# Patient Record
Sex: MALE | Race: WHITE | Employment: OTHER | URBAN - METROPOLITAN AREA
[De-identification: names, ages, dates, MRNs, and addresses within clinical notes are randomized per-mention and may not be internally consistent; named-entity substitution may affect disease eponyms.]

---

## 2017-01-18 ENCOUNTER — TELEPHONE (OUTPATIENT)
Dept: FAMILY MEDICINE | Facility: CLINIC | Age: 72
End: 2017-01-18

## 2017-01-26 NOTE — TELEPHONE ENCOUNTER
"Info given to wife as patient is\" gone ice fishing almost non stop\"  Wife will have him set up an appointment.  "

## 2017-02-21 ENCOUNTER — OFFICE VISIT (OUTPATIENT)
Dept: FAMILY MEDICINE | Facility: CLINIC | Age: 72
End: 2017-02-21
Payer: COMMERCIAL

## 2017-02-21 VITALS
TEMPERATURE: 97.9 F | SYSTOLIC BLOOD PRESSURE: 121 MMHG | OXYGEN SATURATION: 96 % | WEIGHT: 237 LBS | BODY MASS INDEX: 35.1 KG/M2 | HEIGHT: 69 IN | RESPIRATION RATE: 16 BRPM | DIASTOLIC BLOOD PRESSURE: 77 MMHG | HEART RATE: 82 BPM

## 2017-02-21 DIAGNOSIS — E11.65 TYPE 2 DIABETES MELLITUS WITH HYPERGLYCEMIA, WITH LONG-TERM CURRENT USE OF INSULIN (H): ICD-10-CM

## 2017-02-21 DIAGNOSIS — E11.65 TYPE 2 DIABETES MELLITUS WITH HYPERGLYCEMIA (H): ICD-10-CM

## 2017-02-21 DIAGNOSIS — E78.5 HYPERLIPIDEMIA LDL GOAL <70: ICD-10-CM

## 2017-02-21 DIAGNOSIS — Z79.4 TYPE 2 DIABETES MELLITUS WITH HYPERGLYCEMIA, WITH LONG-TERM CURRENT USE OF INSULIN (H): ICD-10-CM

## 2017-02-21 DIAGNOSIS — N40.0 BENIGN NON-NODULAR PROSTATIC HYPERPLASIA, PRESENCE OF LOWER URINARY TRACT SYMPTOMS UNSPECIFIED: Primary | ICD-10-CM

## 2017-02-21 DIAGNOSIS — I10 BENIGN ESSENTIAL HYPERTENSION: ICD-10-CM

## 2017-02-21 DIAGNOSIS — R30.0 DYSURIA: ICD-10-CM

## 2017-02-21 DIAGNOSIS — Z12.5 SCREENING FOR PROSTATE CANCER: ICD-10-CM

## 2017-02-21 LAB
ALBUMIN SERPL-MCNC: 3.9 G/DL (ref 3.4–5)
ALBUMIN UR-MCNC: ABNORMAL MG/DL
ALP SERPL-CCNC: 183 U/L (ref 40–150)
ALT SERPL W P-5'-P-CCNC: 64 U/L (ref 0–70)
ANION GAP SERPL CALCULATED.3IONS-SCNC: 6 MMOL/L (ref 3–14)
APPEARANCE UR: ABNORMAL
AST SERPL W P-5'-P-CCNC: 27 U/L (ref 0–45)
BILIRUB SERPL-MCNC: 0.5 MG/DL (ref 0.2–1.3)
BILIRUB UR QL STRIP: NEGATIVE
BUN SERPL-MCNC: 30 MG/DL (ref 7–30)
CALCIUM SERPL-MCNC: 9.1 MG/DL (ref 8.5–10.1)
CHLORIDE SERPL-SCNC: 104 MMOL/L (ref 94–109)
CHOLEST SERPL-MCNC: 120 MG/DL
CO2 SERPL-SCNC: 28 MMOL/L (ref 20–32)
COLOR UR AUTO: YELLOW
CREAT SERPL-MCNC: 1.18 MG/DL (ref 0.66–1.25)
CREAT UR-MCNC: 139 MG/DL
GFR SERPL CREATININE-BSD FRML MDRD: 61 ML/MIN/1.7M2
GLUCOSE SERPL-MCNC: 196 MG/DL (ref 70–99)
GLUCOSE UR STRIP-MCNC: NEGATIVE MG/DL
HBA1C MFR BLD: 9 % (ref 4.3–6)
HDLC SERPL-MCNC: 40 MG/DL
HGB UR QL STRIP: ABNORMAL
KETONES UR STRIP-MCNC: NEGATIVE MG/DL
LDLC SERPL CALC-MCNC: 62 MG/DL
LEUKOCYTE ESTERASE UR QL STRIP: ABNORMAL
MICROALBUMIN UR-MCNC: 82 MG/L
MICROALBUMIN/CREAT UR: 58.63 MG/G CR (ref 0–17)
NITRATE UR QL: POSITIVE
NON-SQ EPI CELLS #/AREA URNS LPF: ABNORMAL /LPF
NONHDLC SERPL-MCNC: 80 MG/DL
PH UR STRIP: 6 PH (ref 5–7)
POTASSIUM SERPL-SCNC: 5.2 MMOL/L (ref 3.4–5.3)
PROT SERPL-MCNC: 8.7 G/DL (ref 6.8–8.8)
PSA SERPL-ACNC: 3.84 UG/L (ref 0–4)
RBC #/AREA URNS AUTO: ABNORMAL /HPF (ref 0–2)
SODIUM SERPL-SCNC: 138 MMOL/L (ref 133–144)
SP GR UR STRIP: 1.02 (ref 1–1.03)
TRIGL SERPL-MCNC: 88 MG/DL
URN SPEC COLLECT METH UR: ABNORMAL
UROBILINOGEN UR STRIP-ACNC: 0.2 EU/DL (ref 0.2–1)
WBC #/AREA URNS AUTO: >100 /HPF (ref 0–2)

## 2017-02-21 PROCEDURE — 84153 ASSAY OF PSA TOTAL: CPT | Performed by: NURSE PRACTITIONER

## 2017-02-21 PROCEDURE — 83036 HEMOGLOBIN GLYCOSYLATED A1C: CPT | Performed by: NURSE PRACTITIONER

## 2017-02-21 PROCEDURE — 99215 OFFICE O/P EST HI 40 MIN: CPT | Performed by: NURSE PRACTITIONER

## 2017-02-21 PROCEDURE — 81001 URINALYSIS AUTO W/SCOPE: CPT | Performed by: NURSE PRACTITIONER

## 2017-02-21 PROCEDURE — 36415 COLL VENOUS BLD VENIPUNCTURE: CPT | Performed by: NURSE PRACTITIONER

## 2017-02-21 PROCEDURE — 80053 COMPREHEN METABOLIC PANEL: CPT | Performed by: NURSE PRACTITIONER

## 2017-02-21 PROCEDURE — 80061 LIPID PANEL: CPT | Performed by: NURSE PRACTITIONER

## 2017-02-21 PROCEDURE — 82043 UR ALBUMIN QUANTITATIVE: CPT | Performed by: NURSE PRACTITIONER

## 2017-02-21 RX ORDER — INSULIN GLARGINE 100 [IU]/ML
INJECTION, SOLUTION SUBCUTANEOUS
Qty: 3 VIAL | Refills: 4 | Status: SHIPPED
Start: 2017-02-21 | End: 2017-03-21

## 2017-02-21 RX ORDER — SULFAMETHOXAZOLE/TRIMETHOPRIM 800-160 MG
1 TABLET ORAL 2 TIMES DAILY
Qty: 14 TABLET | Refills: 0 | Status: SHIPPED | OUTPATIENT
Start: 2017-02-21 | End: 2017-02-28

## 2017-02-21 RX ORDER — TAMSULOSIN HYDROCHLORIDE 0.4 MG/1
0.4 CAPSULE ORAL DAILY
Qty: 90 CAPSULE | Refills: 1 | Status: SHIPPED | OUTPATIENT
Start: 2017-02-21 | End: 2017-06-12

## 2017-02-21 NOTE — PATIENT INSTRUCTIONS
1. Increase Lantus from 30 units to 33 units and eventually up to 36 units . Increase every 3-5 days.   2. Schedule appointment with Daria Awad- bring Blood sugar logs with you  3. Urine today  4. Start taking Tamsulosin for the urinary/bladder.  5. Take your Furosamide in the morning  6. Follow up in 1-2 months to see if symptoms are improved          Thank you for choosing [facility].  You may be receiving a survey in the mail from [place] regarding your visit today.  Please take a few minutes to complete and return the survey to let us know how we are doing.      If you have questions or concerns, please contact us via Featurespace or you can contact your care team at [phone].    Our Clinic hours are:  Monday 6:40 am  to 7:00 pm  Tuesday -Friday 6:40 am to 5:00 pm    The [place] outpatient lab hours are:  Monday - Friday 6:10 am to 4:45 pm  Saturdays 7:00 am to 11:00 am  Appointments are required, call [phone]    If you have clinical questions after hours or would like to schedule an appointment,  call the clinic at [phone].

## 2017-02-21 NOTE — LETTER
[facility]  [address] [facility] [address]  Phone: [phone]    February 21, 2017    Orville Rogerwayne Plasencia  [address] [address] [place] [address]          Dear Bianca Luis Angel,    The results of your recent lab tests were within normal limits or stable. Enclosed is a copy of these results.  If you have any further questions or problems, please contact our office.    Sincerely,      Anahi Sky NP / bmc  [facility]

## 2017-02-21 NOTE — MR AVS SNAPSHOT
After Visit Summary   2/21/2017    Orville Plasencia    MRN: [id number]           Patient Information     Date Of Birth          1945        Visit Information        Provider Department      2/21/2017 7:20 AM Anahi Sky APRN [facility]        Today's Diagnoses     Benign non-nodular prostatic hyperplasia, presence of lower urinary tract symptoms unspecified    -  1    Type 2 diabetes mellitus with hyperglycemia, with long-term current use of insulin (H)        Screening for prostate cancer          Care Instructions    1. Increase Lantus from 30 units to 33 units and eventually up to 36 units . Increase every 3-5 days.   2. Schedule appointment with Daria Awad- bring Blood sugar logs with you  3. Urine today  4. Start taking Tamsulosin for the urinary/bladder.  5. Take your Furosamide in the morning  6. Follow up in 1-2 months to see if symptoms are improved          Thank you for choosing [facility].  You may be receiving a survey in the mail from [place] regarding your visit today.  Please take a few minutes to complete and return the survey to let us know how we are doing.      If you have questions or concerns, please contact us via [employer] or you can contact your care team at [phone].    Our Clinic hours are:  Monday 6:40 am  to 7:00 pm  Tuesday -Friday 6:40 am to 5:00 pm    The [place] outpatient lab hours are:  Monday - Friday 6:10 am to 4:45 pm  Saturdays 7:00 am to 11:00 am  Appointments are required, call [phone]    If you have clinical questions after hours or would like to schedule an appointment,  call the clinic at [phone].        Follow-ups after your visit        Additional Services     MED THERAPY MANAGE REFERRAL       Your provider has referred you to: **[facility] Scheduling (numerous locations) [phone]   [email or web address]  GIULIANO: [facility]  [facility] [phone]   [email or web address]    Reason for Referral: Elevated A1C     The [facility] department will contact you to schedule an appointment.  You may also schedule the appointment by calling [phone].  For [facility] patients, please call [phone] to confirm/schedule your appointment on the next business day.    This service is designed to help you get the most from your medications.  A specially trained Pharmacist will work closely with you and your providers to solve any questions, concerns, issues or problems related to your medications.    Please bring all of your prescription and non-prescription medications (such as vitamins, over-the-counter medications, and herbals) or a detailed medication list to your appointment.    If you have a glucose meter or other home monitoring information, please also bring this to your appointment (i.e. blood glucose log, blood pressure log, pain log, etc.).                  Future tests that were ordered for you today     Open Future Orders        Priority Expected Expires Ordered    Hemoglobin A1c Routine 5/21/2017 6/24/2017 2/21/2017            Who to contact     If you have questions or need follow up information about today's clinic visit or your schedule please contact [facility] directly at [phone].  Normal or non-critical lab and imaging results will be communicated to you by MyChart, letter or phone within 4 business days after the clinic has received the results. If you do not hear from us within 7 days, please contact the clinic through MyChart or phone. If you have a critical or abnormal lab result, we will notify you by phone as soon as possible.  Submit refill requests through [email or web address] or call your pharmacy and they will forward the refill request to us. Please allow 3 business days for your refill to be completed.           "Additional Information About Your Visit        MyChart Information     [employer] gives you secure access to your electronic health record. If you see a primary care provider, you can also send messages to your care team and make appointments. If you have questions, please call your primary care clinic.  If you do not have a primary care provider, please call [phone] and they will assist you.        Care EveryWhere ID     This is your Care EveryWhere ID. This could be used by other organizations to access your [facility] medical records  [id number]        Your Vitals Were     Pulse Temperature Respirations Height Pulse Oximetry BMI (Body Mass Index)    82 97.9  F (36.6  C) (Tympanic) 16 5' 8.7\" (1.745 m) 96% 35.3 kg/m2       Blood Pressure from Last 3 Encounters:   02/21/17 121/77   09/07/16 108/68   03/04/16 118/73    Weight from Last 3 Encounters:   02/21/17 237 lb (107.5 kg)   09/07/16 234 lb 9.6 oz (106.4 kg)   03/04/16 234 lb (106.1 kg)              We Performed the Following     MED THERAPY MANAGE REFERRAL     Prostate spec antigen screen     UA reflex to Microscopic          Today's Medication Changes          These changes are accurate as of: 2/21/17  7:49 AM.  If you have any questions, ask your nurse or doctor.               Start taking these medicines.        Dose/Directions    tamsulosin 0.4 MG capsule   Commonly known as:  FLOMAX   Used for:  Benign non-nodular prostatic hyperplasia, presence of lower urinary tract symptoms unspecified   Started by:  Anahi Sky APRN CNP        Dose:  0.4 mg   Take 1 capsule (0.4 mg) by mouth daily   Quantity:  90 capsule   Refills:  1         These medicines have changed or have updated prescriptions.        Dose/Directions    insulin glargine 100 UNIT/ML injection   Commonly known as:  LANTUS   This may have changed:  additional instructions   Used for:  Type 2 diabetes mellitus with hyperglycemia, with long-term current use of insulin (H)   Changed by:  " "Elsa Skysalvador Wren APRGERRY CNP        Take 36 units in the Morning and 10 units at bedtime Total units per day are 46 units.   Quantity:  3 vial   Refills:  4            Where to get your medicines      These medications were sent to [id number] - [place], [place] - [address]  [address], [place] [address]     Phone:  [phone]     blood glucose monitoring lancets    insulin aspart 100 UNIT/ML injection    insulin glargine 100 UNIT/ML injection    insulin pen needle 31G X 6 MM    insulin syringe-needle U-100 30G X 1/2\" 0.5 ML    tamsulosin 0.4 MG capsule                Primary Care Provider Office Phone # Fax #    JAHAIRA Perdue [phone]       [facility] [address] [facility] [address]        Thank you!     Thank you for choosing [facility]  for your care. Our goal is always to provide you with excellent care. Hearing back from our patients is one way we can continue to improve our services. Please take a few minutes to complete the written survey that you may receive in the mail after your visit with us. Thank you!             Your Updated Medication List - Protect others around you: Learn how to safely use, store and throw away your medicines at www.disposemymeds.org.          This list is accurate as of: 2/21/17  7:49 AM.  Always use your most recent med list.                   Brand Name Dispense Instructions for use    ASPIRIN NOT PRESCRIBED    INTENTIONAL    0 each    Please choose reason not prescribed, below       blood glucose monitoring lancets     3 Box    Use to test blood sugar 3 times daily       blood glucose monitoring test strip    ACCU-CHEK COMPACT DRUM    3 Box    Use to test blood sugars four times daily 250.00 insulin dependant       furosemide 20 MG tablet    LASIX    90 tablet    Take 1 tablet (20 mg) by mouth daily       insulin aspart 100 UNIT/ML injection    NovoLOG FLEXPEN    100 mL    Take 3 units of Novolog " "along with using sliding scale with breakfast, lunch and supper.  Total units per day is 30 units.       insulin glargine 100 UNIT/ML injection    LANTUS    3 vial    Take 36 units in the Morning and 10 units at bedtime Total units per day are 46 units.       insulin pen needle 31G X 6 MM    ULTICARE MINI    300 each    Use 3 times daily or as directed.       insulin syringe-needle U-100 30G X 1/2\" 0.5 ML    BD insulin syringe ULTRAFINE    300 each    Use one syringe three daily or as directed.       STATIN NOT PRESCRIBED (INTENTIONAL)     0 each    Statin not prescribed intentionally due to Allergy       tamsulosin 0.4 MG capsule    FLOMAX    90 capsule    Take 1 capsule (0.4 mg) by mouth daily         "

## 2017-02-21 NOTE — NURSING NOTE
"Chief Complaint   Patient presents with     Diabetes     Recheck and refills.     Urinary Problem     Difficulty urinary at times.       Initial /77  Pulse 82  Temp 97.9  F (36.6  C) (Tympanic)  Resp 16  Ht 5' 8.7\" (1.745 m)  Wt 237 lb (107.5 kg)  SpO2 96%  BMI 35.3 kg/m2 Estimated body mass index is 35.3 kg/(m^2) as calculated from the following:    Height as of this encounter: 5' 8.7\" (1.745 m).    Weight as of this encounter: 237 lb (107.5 kg).  Medication Reconciliation: complete  "

## 2017-02-21 NOTE — PROGRESS NOTES
SUBJECTIVE:                                                    Orville Plasencia is a 71 year old male who presents to clinic today for the following health issues:      Diabetes Follow-up    Patient is checking blood sugars: three times daily.   Blood sugar testing frequency justification: patient uses sliding scale  Results are as follows:before meal blood sugars 200-330    Diabetic concerns: blood sugar frequently over 200     Symptoms of hypoglycemia (low blood sugar): none     Paresthesias (numbness or burning in feet) or sores: No     Date of last diabetic eye exam: March 22, 2016 - Dr. [facility]    Checking BS three times a day-occasionally testing 4 times a day if BS are high.     Only taking 30 units of Lantus in AM- not 33 as prescribed       Amount of exercise or physical activity: cuts wood, enjoys ice fishing    Problems taking medications regularly: No    Medication side effects: none  Diet: regular (no restrictions); tries to watch carbohydrates.    Genitourinary - Male     Onset: ongoing for past three years    Description:   Dysuria (painful urination): YES  Hematuria (blood in urine): no   Frequency: YES  Are you urinating at night : YES, 3-4 x night  Hesitancy (delay in urine): YES  Retention (unable to empty): YES  Decrease in urinary flow: YES  Incontinence: YES    Progression of Symptoms:  worsening    Accompanying Signs & Symptoms:  Fever: no   Back/Flank pain: no   Urethral discharge: no   Testicle lumps/masses/pain: no   Nausea and/or vomiting: no   Abdominal pain: no    History:   History of frequent UTI's: no   History of kidney stones: no   History of hernias: YES - 2013  Personal or Family history of Prostate problems: not that patient is aware of  Sexually active: YES    Precipitating factors:   Believes Lasix may cause urinary issues.    Alleviating factors:  Better urinating when he does not take it every day.    Taking Laix at bedtime.   No history of prostate  "cancer.         Therapies Tried and outcome: none; Outcome - cut back on Lasix usage. Only uses this medication as needed.    Hypertension:   Controlled with lasix    Hyperlipidemia:  Controlled with diet, allergy to statin  Unable to take ASA due to GI bleed.       Problem list and histories reviewed & adjusted, as indicated.  Additional history: as documented    Problem list, Medication list, Allergies, and Medical/Social/Surgical histories reviewed in [facility] and updated as appropriate.    ROS:  Constitutional, HEENT, cardiovascular, pulmonary, GI, , musculoskeletal, neuro, skin, endocrine and psych systems are negative, except as otherwise noted.    OBJECTIVE:                                                    /77  Pulse 82  Temp 97.9  F (36.6  C) (Tympanic)  Resp 16  Ht 5' 8.7\" (1.745 m)  Wt 237 lb (107.5 kg)  SpO2 96%  BMI 35.3 kg/m2  Body mass index is 35.3 kg/(m^2).  GENERAL: healthy, alert and no distress  NECK: no adenopathy, no asymmetry, masses, or scars and thyroid normal to palpation  RESP: lungs clear to auscultation - no rales, rhonchi or wheezes  CV: regular rate and rhythm, normal S1 S2, no S3 or S4, no murmur, click or rub, no peripheral edema and peripheral pulses strong  MS: no gross musculoskeletal defects noted, no edema  Diabetic foot exam: normal DP and PT pulses, no trophic changes or ulcerative lesions, normal sensory exam and normal monofilament exam    Diagnostic Test Results:  none      ASSESSMENT/PLAN:                                                        1. Type 2 diabetes mellitus with hyperglycemia, with long-term current use of insulin (H)  Uncontrolled- patient not taking Lantus as prescribed - encouraged to increase Lantus from 30 units to  33 units for 5 days and then increase up to 36 units if BS are still in 200's.   - insulin syringe-needle U-100 (BD INSULIN SYRINGE ULTRAFINE) 30G X 1/2\" 0.5 ML; Use one syringe three daily or as directed.  Dispense: 300 each; Refill: " 3  - insulin aspart (NOVOLOG FLEXPEN) 100 UNIT/ML injection; Take 3 units of Novolog along with using sliding scale with breakfast, lunch and supper.   Total units per day is 30 units.  Dispense: 100 mL; Refill: 3  - insulin glargine (LANTUS) 100 UNIT/ML injection; Take 36 units in the Morning and 10 units at bedtime  Total units per day are 46 units.  Dispense: 3 vial; Refill: 4  - insulin pen needle (ULTICARE MINI) 31G X 6 MM; Use 3 times daily or as directed.  Dispense: 300 each; Refill: 3  - blood glucose monitoring (ACCU-CHEK MULTICLIX) lancets; Use to test blood sugar 3 times daily  Dispense: 3 Box; Refill: 3  - Hemoglobin A1c; Future- in 3 months  - ASPIRIN NOT PRESCRIBED (INTENTIONAL); Please choose reason not prescribed, below  Dispense: 0 each; Refill: 0  - Lipid Profile with reflex to direct LDL  - Albumin Random Urine Quantitative  - Comprehensive metabolic panel  - patient to follow up with Daria Awad to help get BS under control    2. Benign non-nodular prostatic hyperplasia, presence of lower urinary tract symptoms unspecified    - UA reflex to Microscopic- r/u infection- if UA positive will send in antibiotics   - start tamsulosin (FLOMAX) 0.4 MG capsule; Take 1 capsule (0.4 mg) by mouth daily  Dispense: 90 capsule; Refill: 1  - Urine Microscopic  - consider referral to Urology  - take lasix in AM - not PM    3. Screening for prostate cancer    - Prostate spec antigen screen    4. Essential hypertension   Controlled-     5. Hyperlipidemia:  Controlled through diet- LDL at 62.  Allergy to Statins    Follow up in 2 months for reassessment    Patient's UA positive for Nitrites and Leukocytes, WBC- > 100- will send in Bactrim DS bid X 7 days-      > 40  min spent in direct face to face time with this patient, greater than 50% in counseling and coordination of care discussing diabetes mellitus. Hypertension, urinary symptoms.       JAHAIRA Perdue [facility]

## 2017-02-21 NOTE — LETTER
[facility]  [address] [facility] [address]  Phone: [phone]    February 22, 2017    Orville Arangowayne Plasencia  [address] [address] [place] [address]          Dear Mr. Plasencia,    The results of your recent Microalbumin urine test showed some protein in your urine. This is from your Diabetes. This is ok. No changes at this time. Enclosed is a copy of these results.  If you have any further questions or problems, please contact our office.    Sincerely,      Anahi Sky, ACACIA / DL

## 2017-03-01 ENCOUNTER — OFFICE VISIT (OUTPATIENT)
Dept: PHARMACY | Facility: CLINIC | Age: 72
End: 2017-03-01
Payer: COMMERCIAL

## 2017-03-01 VITALS — SYSTOLIC BLOOD PRESSURE: 126 MMHG | HEART RATE: 68 BPM | DIASTOLIC BLOOD PRESSURE: 72 MMHG

## 2017-03-01 DIAGNOSIS — E11.65 TYPE 2 DIABETES MELLITUS WITH HYPERGLYCEMIA, WITH LONG-TERM CURRENT USE OF INSULIN (H): Primary | ICD-10-CM

## 2017-03-01 DIAGNOSIS — N40.0 BENIGN PROSTATIC HYPERPLASIA WITHOUT LOWER URINARY TRACT SYMPTOMS, UNSPECIFIED MORPHOLOGY: ICD-10-CM

## 2017-03-01 DIAGNOSIS — R60.1 ANASARCA: ICD-10-CM

## 2017-03-01 DIAGNOSIS — Z79.4 TYPE 2 DIABETES MELLITUS WITH HYPERGLYCEMIA, WITH LONG-TERM CURRENT USE OF INSULIN (H): Primary | ICD-10-CM

## 2017-03-01 PROCEDURE — 99607 MTMS BY PHARM ADDL 15 MIN: CPT | Performed by: PHARMACIST

## 2017-03-01 PROCEDURE — 99605 MTMS BY PHARM NP 15 MIN: CPT | Performed by: PHARMACIST

## 2017-03-01 NOTE — MR AVS SNAPSHOT
After Visit Summary   3/1/2017    Orville Plasencia    MRN: [id number]           Patient Information     Date Of Birth          1945        Visit Information        Provider Department      3/1/2017 1:00 PM Daria Awad [facility]        Today's Diagnoses     Type 2 diabetes mellitus with hyperglycemia, with long-term current use of insulin (H)    -  1      Care Instructions    1. [employer] customer service number is: [phone]. I sent a prescription to your pharmacy for a new Multiclix lancing device.  2. Increase your Novolog dose to 4 units with meals plus sliding scale. If your morning blood sugars are going lower than 80, decrease your Lantus dose by 1-2 units. Your goal morning fasting and before meal blood sugar readings is: .  3. I will look at your Epic chart to see if we can lower or stop your furosemide.    I will call [facility] to follow up on Tuesday, March 21st at 12:30 pm.    Daria Awad, PharmD  [phone] in clinic on Tuesday and Wednesday              Follow-ups after your visit        Your next 10 appointments already scheduled     Mar 21, 2017 12:30 PM CDT   TELEMEDICINE with Daria Awad [facility] ([facility])    [address] [place] [address] [phone]           Note: this is not an onsite visit; there is no need to come to the facility.              Who to contact     If you have questions or need follow up information about today's clinic visit or your schedule please contact [facility] directly at [phone].  Normal or non-critical lab and imaging results will be communicated to you by MyChart, letter or phone within 4 business days after the clinic has received the results. If you do not hear from us within 7 days, please contact the clinic through MyChart or phone. If you have a critical or abnormal lab result, we will  notify you by phone as soon as possible.  Submit refill requests through [employer] or call your pharmacy and they will forward the refill request to us. Please allow 3 business days for your refill to be completed.          Additional Information About Your Visit        [employer] Information     [employer] gives you secure access to your electronic health record. If you see a primary care provider, you can also send messages to your care team and make appointments. If you have questions, please call your primary care clinic.  If you do not have a primary care provider, please call [phone] and they will assist you.        Care EveryWhere ID     This is your Care EveryWhere ID. This could be used by other organizations to access your [facility] medical records  [id number]         Blood Pressure from Last 3 Encounters:   02/21/17 121/77   09/07/16 108/68   03/04/16 118/73    Weight from Last 3 Encounters:   02/21/17 237 lb (107.5 kg)   09/07/16 234 lb 9.6 oz (106.4 kg)   03/04/16 234 lb (106.1 kg)              Today, you had the following     No orders found for display         Today's Medication Changes          These changes are accurate as of: 3/1/17  1:51 PM.  If you have any questions, ask your nurse or doctor.               Start taking these medicines.        Dose/Directions    blood glucose lancing device   Used for:  Type 2 diabetes mellitus with hyperglycemia, with long-term current use of insulin (H)        Device to be used with lancets.   Quantity:  1 each   Refills:  0         These medicines have changed or have updated prescriptions.        Dose/Directions    insulin aspart 100 UNIT/ML injection   Commonly known as:  NovoLOG FLEXPEN   This may have changed:  additional instructions   Used for:  Type 2 diabetes mellitus with hyperglycemia, with long-term current use of insulin (H)        Take 4 units of Novolog along with using sliding scale with breakfast, lunch and supper.  Total units per day is 30  units.   Quantity:  100 mL   Refills:  3            Where to get your medicines      These medications were sent to [facility] [address] [place] [address] - [address]  [address] [place] [address]     Phone:  [phone]     blood glucose lancing device         Some of these will need a paper prescription and others can be bought over the counter.  Ask your nurse if you have questions.     You don't need a prescription for these medications     insulin aspart 100 UNIT/ML injection                Primary Care Provider Office Phone # Fax #    Anahi Sky, JAHAIRA [facility] [phone]       [facility] [address] [facility] [address]        Thank you!     Thank you for choosing [facility]  for your care. Our goal is always to provide you with excellent care. Hearing back from our patients is one way we can continue to improve our services. Please take a few minutes to complete the written survey that you may receive in the mail after your visit with us. Thank you!             Your Updated Medication List - Protect others around you: Learn how to safely use, store and throw away your medicines at www.disposemymeds.org.          This list is accurate as of: 3/1/17  1:51 PM.  Always use your most recent med list.                   Brand Name Dispense Instructions for use    ASPIRIN NOT PRESCRIBED    INTENTIONAL    0 each    Please choose reason not prescribed, below       blood glucose lancing device     1 each    Device to be used with lancets.       blood glucose monitoring lancets     3 Box    Use to test blood sugar 3 times daily       blood glucose monitoring test strip    ACCU-CHEK COMPACT DRUM    3 Box    Use to test blood sugars four times daily 250.00 insulin dependant       furosemide 20 MG tablet    LASIX    90 tablet    Take 1 tablet (20 mg) by mouth daily       insulin aspart 100 UNIT/ML injection    NovoLOG FLEXPEN    100 mL    Take 4 units of  "Novolog along with using sliding scale with breakfast, lunch and supper.  Total units per day is 30 units.       insulin glargine 100 UNIT/ML injection    LANTUS    3 vial    Take 36 units in the Morning and 10 units at bedtime Total units per day are 46 units.       insulin pen needle 31G X 6 MM    ULTICARE MINI    300 each    Use 3 times daily or as directed.       insulin syringe-needle U-100 30G X 1/2\" 0.5 ML    BD insulin syringe ULTRAFINE    300 each    Use one syringe three daily or as directed.       STATIN NOT PRESCRIBED (INTENTIONAL)     0 each    Statin not prescribed intentionally due to Allergy       tamsulosin 0.4 MG capsule    FLOMAX    90 capsule    Take 1 capsule (0.4 mg) by mouth daily         "

## 2017-03-01 NOTE — PATIENT INSTRUCTIONS
1. [facility] customer service number is: [phone]. I sent a prescription to your pharmacy for a new Multiclix lancing device.  2. Increase your Novolog dose to 4 units with meals plus sliding scale. If your morning blood sugars are going lower than 80, decrease your Lantus dose by 1-2 units. Your goal morning fasting and before meal blood sugar readings is: .  3. I will look at your Epic chart to see if we can lower or stop your furosemide.    I will call Socorro to follow up on Tuesday, March 21st at 12:30 pm.    Daria Awad, PharmD  [phone] in clinic on Tuesday and Wednesday

## 2017-03-21 ENCOUNTER — ALLIED HEALTH/NURSE VISIT (OUTPATIENT)
Dept: PHARMACY | Facility: CLINIC | Age: 72
End: 2017-03-21
Payer: COMMERCIAL

## 2017-03-21 DIAGNOSIS — Z79.4 TYPE 2 DIABETES MELLITUS WITH HYPERGLYCEMIA, WITH LONG-TERM CURRENT USE OF INSULIN (H): ICD-10-CM

## 2017-03-21 DIAGNOSIS — E11.65 TYPE 2 DIABETES MELLITUS WITH HYPERGLYCEMIA, WITH LONG-TERM CURRENT USE OF INSULIN (H): ICD-10-CM

## 2017-03-21 PROCEDURE — 99607 MTMS BY PHARM ADDL 15 MIN: CPT | Performed by: PHARMACIST

## 2017-03-21 PROCEDURE — 99606 MTMS BY PHARM EST 15 MIN: CPT | Performed by: PHARMACIST

## 2017-03-21 RX ORDER — INSULIN GLARGINE 100 [IU]/ML
20 INJECTION, SOLUTION SUBCUTANEOUS 2 TIMES DAILY
Qty: 3 VIAL | Refills: 4
Start: 2017-03-21 | End: 2017-03-28

## 2017-03-21 NOTE — PROGRESS NOTES
SUBJECTIVE/OBJECTIVE:                                                    Orville Plasencia is a 71 year old male called for a follow-up visit for Medication Therapy Management. He was referred to me from [facility]. I talked with patient's wife Socorro, I was verbally asked by patient to follow up with Socorro for his blood sugars on all of our follow up visits, also consent to communicate signed on 07/23/2013.    Chief Complaint:  A1C elevated. Follow up from our visit on 3/1/17    Allergies/ADRs: Reviewed in Epic  Tobacco: No tobacco use   Alcohol: not currently using    PMH: Reviewed in Epic    Diabetes:  Pt currently taking Lantus 30 units in the morning and 10 units in the evening. Pt also taking Novolog 4 units with meals plus sliding scale (Sliding scale reported by patient spouse is as follows: (0-140 - nothing; 140-160 -1+ unit; 160-180 - 2+ unit;181-200 - 3+ unit; so approximately +1unit for every 20 points up to 350). Pt is not experiencing side effects. Pt is not counting carbs.   SMBG: three times daily.  Ranges (patient reported): mornings: 320,258,260,294,157,205,145,279,220,207; before lunch: 138,95,182,335,185,191,194,400,176; before dinner: 225,153,88,294,374,355,253,144; bedtime: 360. Patient is not going low overnight, no lows during the day. He lowered his morning Lantus dose - unclear why.   Eye exam: due, making an appointment  Foot exam: up to date  Microalbumin is < 30 mg/g. Pt is not taking an ACEi/ARB.  Aspirin: Not taking due to GI bleed.  Immunization: Did not receive flu vaccine this year, has had both pneumonia vaccines.     Current labs include:  BP Readings from Last 3 Encounters:   03/01/17 126/72   02/21/17 121/77   09/07/16 108/68     Today's Vitals: There were no vitals taken for this visit.  Lab Results   Component Value Date    A1C 9.0 02/21/2017   .  Lab Results   Component Value Date    CHOL 120 02/21/2017     Lab Results   Component Value Date    TRIG 88 02/21/2017     Lab  Results   Component Value Date    HDL 40 02/21/2017     Lab Results   Component Value Date    LDL 62 02/21/2017       Liver Function Studies -   Recent Labs   Lab Test  02/21/17   0816   PROTTOTAL  8.7   ALBUMIN  3.9   BILITOTAL  0.5   ALKPHOS  183*   AST  27   ALT  64       Lab Results   Component Value Date    UCRR 139 02/21/2017    MICROL 82 02/21/2017    UMALCR 58.63 (H) 02/21/2017       Last Basic Metabolic Panel:  Lab Results   Component Value Date     02/21/2017      Lab Results   Component Value Date    POTASSIUM 5.2 02/21/2017     Lab Results   Component Value Date    CHLORIDE 104 02/21/2017     Lab Results   Component Value Date    BUN 30 02/21/2017     Lab Results   Component Value Date    CR 1.18 02/21/2017     GFR Estimate   Date Value Ref Range Status   02/21/2017 61 >60 mL/min/1.7m2 Final     Comment:     Non  GFR Calc   09/07/2016 63 >60 mL/min/1.7m2 Final     Comment:     Non  GFR Calc   02/18/2016 63 >60 mL/min/1.7m2 Final     Comment:     Non  GFR Calc     GFR Estimate If Black   Date Value Ref Range Status   02/21/2017 73 >60 mL/min/1.7m2 Final     Comment:      GFR Calc   09/07/2016 76 >60 mL/min/1.7m2 Final     Comment:      GFR Calc   02/18/2016 76 >60 mL/min/1.7m2 Final     Comment:      GFR Calc     TSH   Date Value Ref Range Status   09/07/2016 1.16 0.40 - 4.00 mU/L Final   ]    Most Recent Immunizations   Administered Date(s) Administered     Influenza (High Dose) 3 valent vaccine 12/03/2014     Pneumococcal (PCV 13) 12/03/2014     Pneumococcal 23 valent 09/20/2013     TDAP (ADACEL AGES 11-64) 10/25/2007     ASSESSMENT:                                                    Current medications were reviewed today as discussed above.      Medication Adherence: no issues identified    Diabetes: Needs Improvement. Patient is not meeting A1c goal of < 8%. His blood sugars are elevated - most  likely more so in the mornings due to patient lowering his morning Lantus dose from 36 to 30 units. I will increase his mealtime insulin and try to get a closer ratio to 50:50 of basal to mealtime insulin. Patient is more active in the spring than during the winter - I did review either lowering mealtime insulin dose if he knows he is going to be active after his meal or to always carry a source of glucose with him to prevent lows.      PLAN:                                                      1. Increase your Novolog dose to 5 units plus sliding scale with meals.   2. Start taking your Lantus 20 units twice daily.   3. Continue to check blood sugars before meals and at bedtime.  4. I sent another message to [facility] to see if Orville can stop his furosemide.     I spent 30 minutes with this patient today.  All changes were made via collaborative practice agreement with [facility]. A copy of the visit note was provided to the patient's primary care provider.     Will follow up in 1 week.    The patient declined a summary of these recommendations as an after visit summary.    Daria Awad, PharmD  Medication Therapy Management

## 2017-03-21 NOTE — PATIENT INSTRUCTIONS
Brent Jacinto and I discussed the followin. Increase your Novolog dose to 5 units plus sliding scale with meals.   2. Start taking your Lantus 20 units twice daily.   3. Continue to check blood sugars before meals and at bedtime.  4. I sent another message to [facility] to see if you can stop your furosemide.     I will call you to follow up in 1 week.    Daria Awad, PharmD  [phone] in clinic on Tuesday and Wednesday

## 2017-03-21 NOTE — MR AVS SNAPSHOT
After Visit Summary   3/21/2017    Orville Plasencia    MRN: [id number]           Patient Information     Date Of Birth          1945        Visit Information        Provider Department      3/21/2017 12:30 PM Daria Awad [facility]        Today's Diagnoses     Type 2 diabetes mellitus with hyperglycemia, with long-term current use of insulin (H)          Care Instructions    Brent Jacinto and I discussed the followin. Increase your Novolog dose to 5 units plus sliding scale with meals.   2. Start taking your Lantus 20 units twice daily.   3. Continue to check blood sugars before meals and at bedtime.  4. I sent another message to [facility] to see if you can stop your furosemide.     I will call you to follow up in 1 week.    Daria Awad, PharmD  [phone] in clinic on Tuesday and Wednesday              Follow-ups after your visit        Your next 10 appointments already scheduled     Mar 28, 2017  1:30 PM CDT   TELEMEDICINE with Daria Awad [facility] ([facility])    [address] [place] [address] [id number] [phone]           Note: this is not an onsite visit; there is no need to come to the facility.              Who to contact     If you have questions or need follow up information about today's clinic visit or your schedule please contact [facility] directly at [phone].  Normal or non-critical lab and imaging results will be communicated to you by MyChart, letter or phone within 4 business days after the clinic has received the results. If you do not hear from us within 7 days, please contact the clinic through MyChart or phone. If you have a critical or abnormal lab result, we will notify you by phone as soon as possible.  Submit refill requests through Inspire or call your pharmacy and they will forward the refill request to us. Please  allow 3 business days for your refill to be completed.          Additional Information About Your Visit        Clever Cloudhart Information     [employer] in Shorts gives you secure access to your electronic health record. If you see a primary care provider, you can also send messages to your care team and make appointments. If you have questions, please call your primary care clinic.  If you do not have a primary care provider, please call [phone] and they will assist you.        Care EveryWhere ID     This is your Care EveryWhere ID. This could be used by other organizations to access your [facility] medical records  [id number]         Blood Pressure from Last 3 Encounters:   03/01/17 126/72   02/21/17 121/77   09/07/16 108/68    Weight from Last 3 Encounters:   02/21/17 237 lb (107.5 kg)   09/07/16 234 lb 9.6 oz (106.4 kg)   03/04/16 234 lb (106.1 kg)              Today, you had the following     No orders found for display         Today's Medication Changes          These changes are accurate as of: 3/21/17  2:48 PM.  If you have any questions, ask your nurse or doctor.               These medicines have changed or have updated prescriptions.        Dose/Directions    insulin aspart 100 UNIT/ML injection   Commonly known as:  NovoLOG FLEXPEN   This may have changed:  additional instructions   Used for:  Type 2 diabetes mellitus with hyperglycemia, with long-term current use of insulin (H)        Take 5 units of Novolog along with using sliding scale with breakfast, lunch and supper.   Quantity:  100 mL   Refills:  3       insulin glargine 100 UNIT/ML injection   Commonly known as:  LANTUS   This may have changed:    - how much to take  - how to take this  - when to take this  - additional instructions   Used for:  Type 2 diabetes mellitus with hyperglycemia, with long-term current use of insulin (H)        Dose:  20 Units   Inject 20 Units Subcutaneous 2 times daily   Quantity:  3 vial   Refills:  4            Where to get your  medicines      Some of these will need a paper prescription and others can be bought over the counter.  Ask your nurse if you have questions.     You don't need a prescription for these medications     insulin aspart 100 UNIT/ML injection    insulin glargine 100 UNIT/ML injection                Primary Care Provider Office Phone # Fax #    JAHAIRA Perdue [phone]       [facility] [address] [address]        Thank you!     Thank you for choosing [facility]  for your care. Our goal is always to provide you with excellent care. Hearing back from our patients is one way we can continue to improve our services. Please take a few minutes to complete the written survey that you may receive in the mail after your visit with us. Thank you!             Your Updated Medication List - Protect others around you: Learn how to safely use, store and throw away your medicines at www.disposemymeds.org.          This list is accurate as of: 3/21/17  2:48 PM.  Always use your most recent med list.                   Brand Name Dispense Instructions for use    ASPIRIN NOT PRESCRIBED    INTENTIONAL    0 each    Please choose reason not prescribed, below       blood glucose lancing device     1 each    Device to be used with lancets.       blood glucose monitoring lancets     3 Box    Use to test blood sugar 3 times daily       blood glucose monitoring test strip    ACCU-CHEK COMPACT DRUM    3 Box    Use to test blood sugars four times daily 250.00 insulin dependant       furosemide 20 MG tablet    LASIX    90 tablet    Take 1 tablet (20 mg) by mouth daily       insulin aspart 100 UNIT/ML injection    NovoLOG FLEXPEN    100 mL    Take 5 units of Novolog along with using sliding scale with breakfast, lunch and supper.       insulin glargine 100 UNIT/ML injection    LANTUS    3 vial    Inject 20 Units Subcutaneous 2 times daily       insulin pen needle 31G X 6 MM     "ULTICARE MINI    300 each    Use 3 times daily or as directed.       insulin syringe-needle U-100 30G X 1/2\" 0.5 ML    BD insulin syringe ULTRAFINE    300 each    Use one syringe three daily or as directed.       STATIN NOT PRESCRIBED (INTENTIONAL)     0 each    Statin not prescribed intentionally due to Allergy       tamsulosin 0.4 MG capsule    FLOMAX    90 capsule    Take 1 capsule (0.4 mg) by mouth daily         "

## 2017-03-28 ENCOUNTER — ALLIED HEALTH/NURSE VISIT (OUTPATIENT)
Dept: PHARMACY | Facility: CLINIC | Age: 72
End: 2017-03-28
Payer: COMMERCIAL

## 2017-03-28 ENCOUNTER — TELEPHONE (OUTPATIENT)
Dept: PHARMACY | Facility: CLINIC | Age: 72
End: 2017-03-28

## 2017-03-28 DIAGNOSIS — Z79.4 TYPE 2 DIABETES MELLITUS WITH HYPERGLYCEMIA, WITH LONG-TERM CURRENT USE OF INSULIN (H): ICD-10-CM

## 2017-03-28 DIAGNOSIS — E11.65 TYPE 2 DIABETES MELLITUS WITH HYPERGLYCEMIA, WITH LONG-TERM CURRENT USE OF INSULIN (H): ICD-10-CM

## 2017-03-28 PROCEDURE — 99606 MTMS BY PHARM EST 15 MIN: CPT | Performed by: PHARMACIST

## 2017-03-28 RX ORDER — INSULIN GLARGINE 100 [IU]/ML
22 INJECTION, SOLUTION SUBCUTANEOUS 2 TIMES DAILY
Qty: 3 VIAL | Refills: 4
Start: 2017-03-28 | End: 2017-04-11

## 2017-03-28 NOTE — TELEPHONE ENCOUNTER
Thanks I'll let him know at his follow up appointment.  Daria Awad, PharmD  Medication Therapy Management

## 2017-03-28 NOTE — PATIENT INSTRUCTIONS
1. Increase your Lantus to 22 units twice daily.  2. Continue to monitor blood sugars before meals and at bedtime.    I will call you to follow up in 2 weeks.    Daria Awad, PharmD  [phone] in clinic on Tuesday and Wednesday

## 2017-03-28 NOTE — TELEPHONE ENCOUNTER
Keyonna,     I've sent a couple of e-mails, not sure you got them. Orville is wondering if he can stop his furosemide? He says that he sometimes goes 2-3 weeks without using it and does not have edema. If you don't want to stop it, we could try lowering it or having him use it as needed.     Let me know your thoughts.   Thanks,   Daira

## 2017-03-28 NOTE — MR AVS SNAPSHOT
After Visit Summary   3/28/2017    Orville Plasencia    MRN: [id number]           Patient Information     Date Of Birth          1945        Visit Information        Provider Department      3/28/2017 1:30 PM Daria Awad [facility]        Today's Diagnoses     Type 2 diabetes mellitus with hyperglycemia, with long-term current use of insulin (H)          Care Instructions    1. Increase your Lantus to 22 units twice daily.  2. Continue to monitor blood sugars before meals and at bedtime.    I will call you to follow up in 2 weeks.    Daria Awad, PharmD  [phone] in clinic on Tuesday and Wednesday          Follow-ups after your visit        Your next 10 appointments already scheduled     Apr 11, 2017  1:30 PM CDT   TELEMEDICINE with Daria wAad [facility] ([facility])    [address] [address]   [phone]           Note: this is not an onsite visit; there is no need to come to the facility.              Who to contact     If you have questions or need follow up information about today's clinic visit or your schedule please contact [facility] directly at [phone].  Normal or non-critical lab and imaging results will be communicated to you by Zawatthart, letter or phone within 4 business days after the clinic has received the results. If you do not hear from us within 7 days, please contact the clinic through Zawatthart or phone. If you have a critical or abnormal lab result, we will notify you by phone as soon as possible.  Submit refill requests through Bioparaiso or call your pharmacy and they will forward the refill request to us. Please allow 3 business days for your refill to be completed.          Additional Information About Your Visit        ZawattharJewel Toned Information     Bi[employer] gives you secure access to your electronic health record. If you see a  primary care provider, you can also send messages to your care team and make appointments. If you have questions, please call your primary care clinic.  If you do not have a primary care provider, please call [phone] and they will assist you.        Care EveryWhere ID     This is your Care EveryWhere ID. This could be used by other organizations to access your [facility] medical records  [id number]         Blood Pressure from Last 3 Encounters:   03/01/17 126/72   02/21/17 121/77   09/07/16 108/68    Weight from Last 3 Encounters:   02/21/17 237 lb (107.5 kg)   09/07/16 234 lb 9.6 oz (106.4 kg)   03/04/16 234 lb (106.1 kg)              Today, you had the following     No orders found for display         Today's Medication Changes          These changes are accurate as of: 3/28/17  2:01 PM.  If you have any questions, ask your nurse or doctor.               These medicines have changed or have updated prescriptions.        Dose/Directions    insulin glargine 100 UNIT/ML injection   Commonly known as:  LANTUS   This may have changed:  how much to take   Used for:  Type 2 diabetes mellitus with hyperglycemia, with long-term current use of insulin (H)   Changed by:  Daria Awad [facility]        Dose:  22 Units   Inject 22 Units Subcutaneous 2 times daily   Quantity:  3 vial   Refills:  4            Where to get your medicines      Some of these will need a paper prescription and others can be bought over the counter.  Ask your nurse if you have questions.     You don't need a prescription for these medications     insulin glargine 100 UNIT/ML injection                Primary Care Provider Office Phone # Fax #    JAHAIRA Perdue [facility] [phone]       [facility] [address] [facility] [address]        Thank you!     Thank you for choosing [facility]  for your care. Our goal is always to provide you with excellent care. Hearing back from our patients is one  "way we can continue to improve our services. Please take a few minutes to complete the written survey that you may receive in the mail after your visit with us. Thank you!             Your Updated Medication List - Protect others around you: Learn how to safely use, store and throw away your medicines at www.disposemymeds.org.          This list is accurate as of: 3/28/17  2:01 PM.  Always use your most recent med list.                   Brand Name Dispense Instructions for use    ASPIRIN NOT PRESCRIBED    INTENTIONAL    0 each    Please choose reason not prescribed, below       blood glucose lancing device     1 each    Device to be used with lancets.       blood glucose monitoring lancets     3 Box    Use to test blood sugar 3 times daily       blood glucose monitoring test strip    ACCU-CHEK COMPACT DRUM    3 Box    Use to test blood sugars four times daily 250.00 insulin dependant       furosemide 20 MG tablet    LASIX    90 tablet    Take 1 tablet (20 mg) by mouth daily       insulin aspart 100 UNIT/ML injection    NovoLOG FLEXPEN    100 mL    Take 5 units of Novolog along with using sliding scale with breakfast, lunch and supper.       insulin glargine 100 UNIT/ML injection    LANTUS    3 vial    Inject 22 Units Subcutaneous 2 times daily       insulin pen needle 31G X 6 MM    ULTICARE MINI    300 each    Use 3 times daily or as directed.       insulin syringe-needle U-100 30G X 1/2\" 0.5 ML    BD insulin syringe ULTRAFINE    300 each    Use one syringe three daily or as directed.       STATIN NOT PRESCRIBED (INTENTIONAL)     0 each    Statin not prescribed intentionally due to Allergy       tamsulosin 0.4 MG capsule    FLOMAX    90 capsule    Take 1 capsule (0.4 mg) by mouth daily         "

## 2017-03-28 NOTE — PROGRESS NOTES
SUBJECTIVE/OBJECTIVE:                                                    Orville Plasencia is a 71 year old male called for a follow-up visit for Medication Therapy Management.  He was referred to me from [facility]. I talked with patient's wife Socorro, I was verbally asked by patient to follow up with Socorro for his blood sugars on all of our follow up visits, also consent to communicate signed on 07/23/2013.    Chief Complaint:  A1C elevated. Follow up from our visit on 3/21/17    Allergies/ADRs: Reviewed in Epic  Tobacco: No tobacco use   Alcohol: not currently using    PMH: Reviewed in Epic    Diabetes:  Pt currently taking Lantus 20 units twice daily. Pt also taking Novolog 5 units with meals plus sliding scale (Sliding scale reported by patient spouse is as follows: (0-140 - nothing; 140-160 -1+ unit; 160-180 - 2+ unit;181-200 - 3+ unit; so approximately +1unit for every 20 points up to 350). Pt is not experiencing side effects. Pt is not counting carbs.   SMBG: three times daily.  Ranges (patient reported): mornings: 78,134,84,149*,185*,171,172; before lunch: 128,146,145,331*,261*,143,110; before dinner: 178,80,153,336*,363* (postpranial),118. Patient is not going low overnight, no lows during the day. *Socorro states that Saturday and Sunday were a disaster. She was baking a lot and Orville ate a lot of her baking - they also did not do as much for exercise. During the week, Orville has been doing a lot of wood splitting.   Eye exam: due, making an appointment  Foot exam: up to date  Microalbumin is < 30 mg/g. Pt is not taking an ACEi/ARB.  Aspirin: Not taking due to GI bleed.  Immunization: Did not receive flu vaccine this year, has had both pneumonia vaccines.    Current labs include:  BP Readings from Last 3 Encounters:   03/01/17 126/72   02/21/17 121/77   09/07/16 108/68     Today's Vitals: There were no vitals taken for this visit.  Lab Results   Component Value Date    A1C 9.0 02/21/2017   .  Lab Results    Component Value Date    CHOL 120 02/21/2017     Lab Results   Component Value Date    TRIG 88 02/21/2017     Lab Results   Component Value Date    HDL 40 02/21/2017     Lab Results   Component Value Date    LDL 62 02/21/2017       Liver Function Studies -   Recent Labs   Lab Test  02/21/17   0816   PROTTOTAL  8.7   ALBUMIN  3.9   BILITOTAL  0.5   ALKPHOS  183*   AST  27   ALT  64       Lab Results   Component Value Date    UCRR 139 02/21/2017    MICROL 82 02/21/2017    UMALCR 58.63 (H) 02/21/2017       Last Basic Metabolic Panel:  Lab Results   Component Value Date     02/21/2017      Lab Results   Component Value Date    POTASSIUM 5.2 02/21/2017     Lab Results   Component Value Date    CHLORIDE 104 02/21/2017     Lab Results   Component Value Date    BUN 30 02/21/2017     Lab Results   Component Value Date    CR 1.18 02/21/2017     GFR Estimate   Date Value Ref Range Status   02/21/2017 61 >60 mL/min/1.7m2 Final     Comment:     Non  GFR Calc   09/07/2016 63 >60 mL/min/1.7m2 Final     Comment:     Non  GFR Calc   02/18/2016 63 >60 mL/min/1.7m2 Final     Comment:     Non  GFR Calc     GFR Estimate If Black   Date Value Ref Range Status   02/21/2017 73 >60 mL/min/1.7m2 Final     Comment:      GFR Calc   09/07/2016 76 >60 mL/min/1.7m2 Final     Comment:      GFR Calc   02/18/2016 76 >60 mL/min/1.7m2 Final     Comment:      GFR Calc     TSH   Date Value Ref Range Status   09/07/2016 1.16 0.40 - 4.00 mU/L Final   ]    Most Recent Immunizations   Administered Date(s) Administered     Influenza (High Dose) 3 valent vaccine 12/03/2014     Pneumococcal (PCV 13) 12/03/2014     Pneumococcal 23 valent 09/20/2013     TDAP Vaccine (Adacel) 10/25/2007     ASSESSMENT:                                                    Current medications were reviewed today as discussed above.      Medication Adherence: no issues  identified    Diabetes: improving. Patient is not meeting A1c goal of < 8%. His blood sugars are much improved, except Saturday and Sunday. His morning readings are a little high this past week - will increase Lantus (see Plan). Will continue to monitor - may need a different schedule for the weekend vs weekday - but need to monitor longer to determine.      PLAN:                                                      1. Increase your Lantus to 22 units twice daily.   2. Continue to monitor blood sugars before meals and at bedtime.     At follow up: see if Keyonna sent reply about furosemide.    I spent 15 minutes with this patient today.  All changes were made via collaborative practice agreement with [facility]. A copy of the visit note was provided to the patient's primary care provider.     Will follow up in 2 weeks.    The patient declined a summary of these recommendations as an after visit summary.    Daria Awad, PharmD  Medication Therapy Management

## 2017-04-11 ENCOUNTER — ALLIED HEALTH/NURSE VISIT (OUTPATIENT)
Dept: PHARMACY | Facility: CLINIC | Age: 72
End: 2017-04-11
Payer: COMMERCIAL

## 2017-04-11 DIAGNOSIS — I10 HYPERTENSION GOAL BP (BLOOD PRESSURE) < 140/90: ICD-10-CM

## 2017-04-11 DIAGNOSIS — R60.9 EDEMA, UNSPECIFIED TYPE: ICD-10-CM

## 2017-04-11 DIAGNOSIS — Z79.4 TYPE 2 DIABETES MELLITUS WITH HYPERGLYCEMIA, WITH LONG-TERM CURRENT USE OF INSULIN (H): Primary | ICD-10-CM

## 2017-04-11 DIAGNOSIS — E11.65 TYPE 2 DIABETES MELLITUS WITH HYPERGLYCEMIA, WITH LONG-TERM CURRENT USE OF INSULIN (H): Primary | ICD-10-CM

## 2017-04-11 PROCEDURE — 99607 MTMS BY PHARM ADDL 15 MIN: CPT | Performed by: PHARMACIST

## 2017-04-11 PROCEDURE — 99606 MTMS BY PHARM EST 15 MIN: CPT | Performed by: PHARMACIST

## 2017-04-11 RX ORDER — FUROSEMIDE 20 MG
20 TABLET ORAL DAILY PRN
Qty: 90 TABLET | Refills: 3
Start: 2017-04-11 | End: 2017-06-12

## 2017-04-11 RX ORDER — INSULIN GLARGINE 100 [IU]/ML
21 INJECTION, SOLUTION SUBCUTANEOUS 2 TIMES DAILY
Qty: 3 VIAL | Refills: 4
Start: 2017-04-11 | End: 2017-05-30

## 2017-04-11 NOTE — MR AVS SNAPSHOT
After Visit Summary   4/11/2017    Orville Plasencia    MRN: [id number]           Patient Information     Date Of Birth          1945        Visit Information        Provider Department      4/11/2017 1:30 PM Daria Awad [facility]        Today's Diagnoses     Type 2 diabetes mellitus with hyperglycemia, with long-term current use of insulin (H)    -  1    Hypertension goal BP (blood pressure) < 140/90        Edema, unspecified type          Care Instructions    1. Decrease Lantus dose to 21 units twice daily.  2. Increase Novolog to 6 units plus sliding scale with meals.   3. Okay to continue to take furosemide as needed.     I will call you to follow up in 2 weeks.    Daria Awad, PharmD  [phone] in clinic on Tuesday and Wednesday          Follow-ups after your visit        Your next 10 appointments already scheduled     Apr 25, 2017  1:30 PM CDT   TELEMEDICINE with Daria Awad [facility] ([facility])    [address] [place] [id number] [phone]           Note: this is not an onsite visit; there is no need to come to the facility.              Who to contact     If you have questions or need follow up information about today's clinic visit or your schedule please contact [facility] directly at [phone].  Normal or non-critical lab and imaging results will be communicated to you by MyChart, letter or phone within 4 business days after the clinic has received the results. If you do not hear from us within 7 days, please contact the clinic through MyChart or phone. If you have a critical or abnormal lab result, we will notify you by phone as soon as possible.  Submit refill requests through [employer] or call your pharmacy and they will forward the refill request to us. Please allow 3 business days for your refill to be completed.          Additional  Information About Your Visit        [employer] Information     [employer] gives you secure access to your electronic health record. If you see a primary care provider, you can also send messages to your care team and make appointments. If you have questions, please call your primary care clinic.  If you do not have a primary care provider, please call [phone] and they will assist you.        Care EveryWhere ID     This is your Care EveryWhere ID. This could be used by other organizations to access your [place] medical records  [id number]         Blood Pressure from Last 3 Encounters:   03/01/17 126/72   02/21/17 121/77   09/07/16 108/68    Weight from Last 3 Encounters:   02/21/17 237 lb (107.5 kg)   09/07/16 234 lb 9.6 oz (106.4 kg)   03/04/16 234 lb (106.1 kg)              Today, you had the following     No orders found for display         Today's Medication Changes          These changes are accurate as of: 4/11/17  5:07 PM.  If you have any questions, ask your nurse or doctor.               These medicines have changed or have updated prescriptions.        Dose/Directions    furosemide 20 MG tablet   Commonly known as:  LASIX   This may have changed:    - when to take this  - reasons to take this   Used for:  Hypertension goal BP (blood pressure) < 140/90        Dose:  20 mg   Take 1 tablet (20 mg) by mouth daily as needed   Quantity:  90 tablet   Refills:  3       insulin aspart 100 UNIT/ML injection   Commonly known as:  NovoLOG FLEXPEN   This may have changed:  additional instructions   Used for:  Type 2 diabetes mellitus with hyperglycemia, with long-term current use of insulin (H)        Take 6 units of Novolog along with using sliding scale with breakfast, lunch and supper.   Quantity:  100 mL   Refills:  3       insulin glargine 100 UNIT/ML injection   Commonly known as:  LANTUS   This may have changed:  how much to take   Used for:  Type 2 diabetes mellitus with hyperglycemia, with long-term current  use of insulin (H)        Dose:  21 Units   Inject 21 Units Subcutaneous 2 times daily   Quantity:  3 vial   Refills:  4            Where to get your medicines      Some of these will need a paper prescription and others can be bought over the counter.  Ask your nurse if you have questions.     You don't need a prescription for these medications     furosemide 20 MG tablet    insulin aspart 100 UNIT/ML injection    insulin glargine 100 UNIT/ML injection                Primary Care Provider Office Phone # Fax #    JAHAIRA Perdue [facility] [phone]       [facility] [address] [place] [id number]        Thank you!     Thank you for choosing [facility]  for your care. Our goal is always to provide you with excellent care. Hearing back from our patients is one way we can continue to improve our services. Please take a few minutes to complete the written survey that you may receive in the mail after your visit with us. Thank you!             Your Updated Medication List - Protect others around you: Learn how to safely use, store and throw away your medicines at www.disposemymeds.org.          This list is accurate as of: 4/11/17  5:07 PM.  Always use your most recent med list.                   Brand Name Dispense Instructions for use    ASPIRIN NOT PRESCRIBED    INTENTIONAL    0 each    Please choose reason not prescribed, below       blood glucose lancing device     1 each    Device to be used with lancets.       blood glucose monitoring lancets     3 Box    Use to test blood sugar 3 times daily       blood glucose monitoring test strip    ACCU-CHEK COMPACT DRUM    3 Box    Use to test blood sugars four times daily 250.00 insulin dependant       furosemide 20 MG tablet    LASIX    90 tablet    Take 1 tablet (20 mg) by mouth daily as needed       insulin aspart 100 UNIT/ML injection    NovoLOG FLEXPEN    100 mL    Take 6 units of Novolog along with using sliding  "scale with breakfast, lunch and supper.       insulin glargine 100 UNIT/ML injection    LANTUS    3 vial    Inject 21 Units Subcutaneous 2 times daily       insulin pen needle 31G X 6 MM    ULTICARE MINI    300 each    Use 3 times daily or as directed.       insulin syringe-needle U-100 30G X 1/2\" 0.5 ML    BD insulin syringe ULTRAFINE    300 each    Use one syringe three daily or as directed.       STATIN NOT PRESCRIBED (INTENTIONAL)     0 each    Statin not prescribed intentionally due to Allergy       tamsulosin 0.4 MG capsule    FLOMAX    90 capsule    Take 1 capsule (0.4 mg) by mouth daily         "

## 2017-04-11 NOTE — PATIENT INSTRUCTIONS
1. Decrease Lantus dose to 21 units twice daily.  2. Increase Novolog to 6 units plus sliding scale with meals.   3. Okay to continue to take furosemide as needed.     I will call you to follow up in 2 weeks.    Daria Awad, PharmD  [phone] in clinic on Tuesday and Wednesday

## 2017-04-11 NOTE — PROGRESS NOTES
SUBJECTIVE/OBJECTIVE:                                                    Orville Plasencia is a 71 year old male called for a follow-up visit for Medication Therapy Management.  He was referred to me from Anahi Sky. I talked with patient's wife Socorro, I was verbally asked by patient to follow up with Socorro for his blood sugars on all of our follow up visits, also consent to communicate signed on 07/23/2013.    Chief Complaint:  A1C elevated. Follow up from our visit on 3/28/17    Allergies/ADRs: Reviewed in Epic  Tobacco: No tobacco use   Alcohol: not currently using    PMH: Reviewed in Epic    Diabetes:  Pt currently taking Lantus 22 units twice daily. Pt also taking Novolog 5 units with meals plus sliding scale (Sliding scale reported by patient spouse is as follows: (0-140 - nothing; 140-160 -1+ unit; 160-180 - 2+ unit;181-200 - 3+ unit; so approximately +1unit for every 20 points up to 350). Pt is not experiencing side effects. Pt is not counting carbs.   SMBG: three times daily.  Ranges (patient reported): mornings: 87,69,116,323,105,146,100,62,57,203,92,92,220,247; before lunch: 110,117,356,384539,243,230,119,338,425(postprandial),221,124,174; before dinner: 116,75,349,92,79,228,280,92,74,258,448(no Novolog with lunch),329,291,170.   Foot exam: up to date  Microalbumin is < 30 mg/g. Pt is not taking an ACEi/ARB.  Aspirin: Not taking due to GI bleed.  Immunization: Did not receive flu vaccine this year, has had both pneumonia vaccines.    Edema: talked with Anahi Sky, she agreed that Orville can take his furosemide as needed. I discussed this with patient's wife, she states that is how Orville is currently taking his furosemide and working well.     Current labs include:  BP Readings from Last 3 Encounters:   03/01/17 126/72   02/21/17 121/77   09/07/16 108/68     Today's Vitals: There were no vitals taken for this visit.  Lab Results   Component Value Date    A1C 9.0 02/21/2017   .  Lab Results   Component  Value Date    CHOL 120 02/21/2017     Lab Results   Component Value Date    TRIG 88 02/21/2017     Lab Results   Component Value Date    HDL 40 02/21/2017     Lab Results   Component Value Date    LDL 62 02/21/2017       Liver Function Studies -   Recent Labs   Lab Test  02/21/17   0816   PROTTOTAL  8.7   ALBUMIN  3.9   BILITOTAL  0.5   ALKPHOS  183*   AST  27   ALT  64       Lab Results   Component Value Date    UCRR 139 02/21/2017    MICROL 82 02/21/2017    UMALCR 58.63 (H) 02/21/2017       Last Basic Metabolic Panel:  Lab Results   Component Value Date     02/21/2017      Lab Results   Component Value Date    POTASSIUM 5.2 02/21/2017     Lab Results   Component Value Date    CHLORIDE 104 02/21/2017     Lab Results   Component Value Date    BUN 30 02/21/2017     Lab Results   Component Value Date    CR 1.18 02/21/2017     GFR Estimate   Date Value Ref Range Status   02/21/2017 61 >60 mL/min/1.7m2 Final     Comment:     Non  GFR Calc   09/07/2016 63 >60 mL/min/1.7m2 Final     Comment:     Non  GFR Calc   02/18/2016 63 >60 mL/min/1.7m2 Final     Comment:     Non  GFR Calc     GFR Estimate If Black   Date Value Ref Range Status   02/21/2017 73 >60 mL/min/1.7m2 Final     Comment:      GFR Calc   09/07/2016 76 >60 mL/min/1.7m2 Final     Comment:      GFR Calc   02/18/2016 76 >60 mL/min/1.7m2 Final     Comment:      GFR Calc     TSH   Date Value Ref Range Status   09/07/2016 1.16 0.40 - 4.00 mU/L Final   ]    Most Recent Immunizations   Administered Date(s) Administered     Influenza (High Dose) 3 valent vaccine 12/03/2014     Pneumococcal (PCV 13) 12/03/2014     Pneumococcal 23 valent 09/20/2013     TDAP Vaccine (Adacel) 10/25/2007     ASSESSMENT:                                                    Current medications were reviewed today as discussed above.      Medication Adherence: no issues identified    Diabetes:  Needs Improvement. Patient is not meeting A1c goal of < 8%. His blood sugars are fluctuating. Having some lows and some highs. I will lower Lantus dose and increase mealtime insulin.     Edema: stable     PLAN:                                                      1. Decrease Lantus dose to 21 units twice daily.  2. Increase Novolog to 6 units plus sliding scale with meals.   3. Okay to continue to take furosemide as needed.     I spent 30 minutes with this patient today.  All changes were made via collaborative practice agreement with Anahi Sky. A copy of the visit note was provided to the patient's primary care provider.     Will follow up in 2 weeks.    The patient declined a summary of these recommendations as an after visit summary.    Daria Awad, PharmD  Medication Therapy Management

## 2017-04-25 ENCOUNTER — TELEPHONE (OUTPATIENT)
Dept: PHARMACY | Facility: CLINIC | Age: 72
End: 2017-04-25

## 2017-04-25 NOTE — TELEPHONE ENCOUNTER
Called patient to follow up. Left a message asking him to return my call.   Daria Awad, PharmD  Medication Therapy Management

## 2017-05-30 ENCOUNTER — ALLIED HEALTH/NURSE VISIT (OUTPATIENT)
Dept: PHARMACY | Facility: CLINIC | Age: 72
End: 2017-05-30
Payer: COMMERCIAL

## 2017-05-30 DIAGNOSIS — Z79.4 TYPE 2 DIABETES MELLITUS WITH HYPERGLYCEMIA, WITH LONG-TERM CURRENT USE OF INSULIN (H): ICD-10-CM

## 2017-05-30 DIAGNOSIS — E11.65 TYPE 2 DIABETES MELLITUS WITH HYPERGLYCEMIA, WITH LONG-TERM CURRENT USE OF INSULIN (H): ICD-10-CM

## 2017-05-30 PROCEDURE — 99606 MTMS BY PHARM EST 15 MIN: CPT | Performed by: PHARMACIST

## 2017-05-30 PROCEDURE — 99607 MTMS BY PHARM ADDL 15 MIN: CPT | Performed by: PHARMACIST

## 2017-05-30 RX ORDER — INSULIN GLARGINE 100 [IU]/ML
20 INJECTION, SOLUTION SUBCUTANEOUS 2 TIMES DAILY
Qty: 3 VIAL | Refills: 4
Start: 2017-05-30 | End: 2017-06-12

## 2017-05-30 NOTE — PROGRESS NOTES
SUBJECTIVE/OBJECTIVE:                Orville Plasencia is a 72 year old male called for a follow-up visit for Medication Therapy Management.  He was referred to me from Anahi Sky. I talked with patient's wife Socorro, I was verbally asked by patient to follow up with Socorro for his blood sugars on all of our follow up visits, also consent to communicate signed on 07/23/2013.    Chief Complaint:  A1C elevated. Follow up from our visit on 4/11/17    Allergies/ADRs: Reviewed in Epic  Tobacco: No tobacco use   Alcohol: not currently using    PMH: Reviewed in Epic    Diabetes:  Pt currently taking Lantus 20 units twice daily. Pt also taking Novolog 6 units with meals plus sliding scale (Sliding scale reported by patient spouse is as follows: (0-140 - nothing; 141-180 +1 unit; 181-220 +2 units; 221-260 +3 units; 261-300 +4 units: 301-240 +5 units; >340 +6 units). Pt is not experiencing side effects. Pt is not counting carbs.   SMBG: three times daily.  Ranges (patient reported): mornings: 105,107,90,151,143,187,121,115,174,208,119,81,193,120,198,107,160,196,89,225,151,158,214,205,145,117,221,146,71,157,142; before lunch: 197,123,151,151,208,156,180,143,94,93,163,133,190,183,190,207,162,206,120,216,117,147,111,156,133,164,142,121; before dinner: 193,169,197,188,223,215,69,165,108,197,147,206,190,150,79,170,215,79,211,211,116,204,181,153,187,133,160.    Foot exam: up to date  Microalbumin is < 30 mg/g. Pt is not taking an ACEi/ARB.  Aspirin: Not taking due to GI bleed.  Immunization: Did not receive flu vaccine this year, has had both pneumonia vaccines.  Patient is due for A1c lab this month.     Current labs include:  BP Readings from Last 3 Encounters:   03/01/17 126/72   02/21/17 121/77   09/07/16 108/68     Today's Vitals: There were no vitals taken for this visit.    Most Recent Immunizations   Administered Date(s) Administered     Influenza (High Dose) 3 valent vaccine 12/03/2014     Pneumococcal (PCV 13) 12/03/2014      Pneumococcal 23 valent 09/20/2013     TDAP Vaccine (Adacel) 10/25/2007     ASSESSMENT:              Current medications were reviewed today as discussed above.      Medication Adherence: no issues identified    Diabetes: Needs Improvement. Patient is not meeting A1c goal of < 8%. His daytime blood sugars are elevated. Will increase Novolog dose (see Plan).     PLAN:                  1. Increase Novolog to 7 units plus sliding scale with meals.     I spent 20 minutes with this patient today.  All changes were made via collaborative practice agreement with Anahi Sky. A copy of the visit note was provided to the patient's primary care provider.     Will follow up in 2 weeks.    The patient declined a summary of these recommendations as an after visit summary.    Daria Awad, PharmD  Medication Therapy Management

## 2017-05-30 NOTE — PATIENT INSTRUCTIONS
1. Increase Novolog to 7 units plus sliding scale with meals.     I will call you to follow up in 2 weeks.     Daria Awad, PharmD  [phone] in clinic on Tuesday and Wednesday

## 2017-05-30 NOTE — MR AVS SNAPSHOT
After Visit Summary   5/30/2017    Orville Plasencia    MRN: [id number]           Patient Information     Date Of Birth          1945        Visit Information        Provider Department      5/30/2017 10:00 AM Daria Awad, [facility]        Today's Diagnoses     Type 2 diabetes mellitus with hyperglycemia, with long-term current use of insulin (H)          Care Instructions    1. Increase Novolog to 7 units plus sliding scale with meals.     I will call you to follow up in 2 weeks.     Daria Awad, PharmD  [phone] in clinic on Tuesday and Wednesday            Follow-ups after your visit        Your next 10 appointments already scheduled     Jun 13, 2017  9:30 AM CDT   TELEMEDICINE with Daria Awad [facility] ([facility])    [address] [place] [id number]   [phone]           Note: this is not an onsite visit; there is no need to come to the facility.              Who to contact     If you have questions or need follow up information about today's clinic visit or your schedule please contact [facility] directly at [phone].  Normal or non-critical lab and imaging results will be communicated to you by MyChart, letter or phone within 4 business days after the clinic has received the results. If you do not hear from us within 7 days, please contact the clinic through [employer] or phone. If you have a critical or abnormal lab result, we will notify you by phone as soon as possible.  Submit refill requests through [employer] or call your pharmacy and they will forward the refill request to us. Please allow 3 business days for your refill to be completed.          Additional Information About Your Visit        [employer] Information     [employer] gives you secure access to your electronic health record. If you see a primary care provider, you can also send messages to  your care team and make appointments. If you have questions, please call your primary care clinic.  If you do not have a primary care provider, please call [phone] and they will assist you.        Care EveryWhere ID     This is your Care EveryWhere ID. This could be used by other organizations to access your [place] medical records  [id number]         Blood Pressure from Last 3 Encounters:   03/01/17 126/72   02/21/17 121/77   09/07/16 108/68    Weight from Last 3 Encounters:   02/21/17 237 lb (107.5 kg)   09/07/16 234 lb 9.6 oz (106.4 kg)   03/04/16 234 lb (106.1 kg)              Today, you had the following     No orders found for display         Today's Medication Changes          These changes are accurate as of: 5/30/17 12:35 PM.  If you have any questions, ask your nurse or doctor.               These medicines have changed or have updated prescriptions.        Dose/Directions    insulin aspart 100 UNIT/ML injection   Commonly known as:  NovoLOG FLEXPEN   This may have changed:  additional instructions   Used for:  Type 2 diabetes mellitus with hyperglycemia, with long-term current use of insulin (H)        Take 7 units of Novolog along with using sliding scale with breakfast, lunch and supper.   Quantity:  100 mL   Refills:  3       insulin glargine 100 UNIT/ML injection   Commonly known as:  LANTUS   This may have changed:  how much to take   Used for:  Type 2 diabetes mellitus with hyperglycemia, with long-term current use of insulin (H)        Dose:  20 Units   Inject 20 Units Subcutaneous 2 times daily   Quantity:  3 vial   Refills:  4            Where to get your medicines      Some of these will need a paper prescription and others can be bought over the counter.  Ask your nurse if you have questions.     You don't need a prescription for these medications     insulin aspart 100 UNIT/ML injection    insulin glargine 100 UNIT/ML injection                Primary Care Provider Office Phone # Fax #  "   Anahi Sky, APRN [phone]       [facility] [address] [facility] [address]        Thank you!     Thank you for choosing [facility]  for your care. Our goal is always to provide you with excellent care. Hearing back from our patients is one way we can continue to improve our services. Please take a few minutes to complete the written survey that you may receive in the mail after your visit with us. Thank you!             Your Updated Medication List - Protect others around you: Learn how to safely use, store and throw away your medicines at www.disposemymeds.org.          This list is accurate as of: 5/30/17 12:35 PM.  Always use your most recent med list.                   Brand Name Dispense Instructions for use    ASPIRIN NOT PRESCRIBED    INTENTIONAL    0 each    Please choose reason not prescribed, below       blood glucose lancing device     1 each    Device to be used with lancets.       blood glucose monitoring lancets     3 Box    Use to test blood sugar 3 times daily       blood glucose monitoring test strip    ACCU-CHEK COMPACT DRUM    3 Box    Use to test blood sugars four times daily 250.00 insulin dependant       furosemide 20 MG tablet    LASIX    90 tablet    Take 1 tablet (20 mg) by mouth daily as needed       insulin aspart 100 UNIT/ML injection    NovoLOG FLEXPEN    100 mL    Take 7 units of Novolog along with using sliding scale with breakfast, lunch and supper.       insulin glargine 100 UNIT/ML injection    LANTUS    3 vial    Inject 20 Units Subcutaneous 2 times daily       insulin pen needle 31G X 6 MM    ULTICARE MINI    300 each    Use 3 times daily or as directed.       insulin syringe-needle U-100 30G X 1/2\" 0.5 ML    BD insulin syringe ULTRAFINE    300 each    Use one syringe three daily or as directed.       STATIN NOT PRESCRIBED (INTENTIONAL)     0 each    Statin not prescribed intentionally due to Allergy       " tamsulosin 0.4 MG capsule    FLOMAX    90 capsule    Take 1 capsule (0.4 mg) by mouth daily

## 2017-06-07 ENCOUNTER — TELEPHONE (OUTPATIENT)
Dept: FAMILY MEDICINE | Facility: CLINIC | Age: 72
End: 2017-06-07

## 2017-06-07 NOTE — TELEPHONE ENCOUNTER
Reason for Call: Request for an order or referral:    Order or referral being requested: lab orders    Date needed: before my next appointment    Has the patient been seen by the PCP for this problem? NO    Additional comments: pt's wife calling in to see if we can put labs in. She was wondering if pt can get a lyme's test as well. He has both a lab only and an appt with R Asya.    Phone number Patient can be reached at:  Home number on file [phone] (home)    Best Time:  any    Can we leave a detailed message on this number?  YES    Call taken on 6/7/2017 at 7:48 AM by Bella Vance

## 2017-06-07 NOTE — TELEPHONE ENCOUNTER
Left message for spouse to return call to clinic.  Does not appear that patient is due for diabetic labs until August.  They were last drawn in February.  Is patient having symptoms of lymes or did he have a tick bite?    Lynn Heath RN

## 2017-06-08 NOTE — TELEPHONE ENCOUNTER
He has a lab appt 6/9/17. Has Hgb A1c ordered.   Last seen by Keyonna Sky in February and was to recheck in office in 2 months. I do see he has an appt 6/12 Monday with Keyonna Sky.     Tried again to call Orville (patient) or his wife, Socorro left message on answering machine for patient or wife  to call back.    Keyonna- please see the note below, wife had asked for a lyme test to be added to his lab tomorrow,   Is this something you want to do, or see him Monday first to discuss since we can't reach him?   Samia Bingham RNC

## 2017-06-08 NOTE — TELEPHONE ENCOUNTER
Left this detailed message on patient's voicemail.  The voicemail prompt identified patient as the  of this message.  Gerda Beasley RN

## 2017-06-09 DIAGNOSIS — E11.65 TYPE 2 DIABETES MELLITUS WITH HYPERGLYCEMIA, WITH LONG-TERM CURRENT USE OF INSULIN (H): ICD-10-CM

## 2017-06-09 DIAGNOSIS — Z79.4 TYPE 2 DIABETES MELLITUS WITH HYPERGLYCEMIA, WITH LONG-TERM CURRENT USE OF INSULIN (H): ICD-10-CM

## 2017-06-09 LAB — HBA1C MFR BLD: 7.7 % (ref 4.3–6)

## 2017-06-09 PROCEDURE — 83036 HEMOGLOBIN GLYCOSYLATED A1C: CPT | Performed by: NURSE PRACTITIONER

## 2017-06-09 PROCEDURE — 36415 COLL VENOUS BLD VENIPUNCTURE: CPT | Performed by: NURSE PRACTITIONER

## 2017-06-12 ENCOUNTER — OFFICE VISIT (OUTPATIENT)
Dept: FAMILY MEDICINE | Facility: CLINIC | Age: 72
End: 2017-06-12
Payer: COMMERCIAL

## 2017-06-12 VITALS
OXYGEN SATURATION: 95 % | HEART RATE: 57 BPM | WEIGHT: 243 LBS | SYSTOLIC BLOOD PRESSURE: 128 MMHG | BODY MASS INDEX: 36.2 KG/M2 | DIASTOLIC BLOOD PRESSURE: 80 MMHG

## 2017-06-12 DIAGNOSIS — Z79.4 TYPE 2 DIABETES MELLITUS WITH HYPERGLYCEMIA, WITH LONG-TERM CURRENT USE OF INSULIN (H): ICD-10-CM

## 2017-06-12 DIAGNOSIS — I10 HYPERTENSION GOAL BP (BLOOD PRESSURE) < 140/90: ICD-10-CM

## 2017-06-12 DIAGNOSIS — E11.65 TYPE 2 DIABETES MELLITUS WITH HYPERGLYCEMIA, WITH LONG-TERM CURRENT USE OF INSULIN (H): ICD-10-CM

## 2017-06-12 DIAGNOSIS — N40.0 BENIGN NON-NODULAR PROSTATIC HYPERPLASIA, PRESENCE OF LOWER URINARY TRACT SYMPTOMS UNSPECIFIED: ICD-10-CM

## 2017-06-12 LAB
ANION GAP SERPL CALCULATED.3IONS-SCNC: 5 MMOL/L (ref 3–14)
BUN SERPL-MCNC: 20 MG/DL (ref 7–30)
CALCIUM SERPL-MCNC: 8.7 MG/DL (ref 8.5–10.1)
CHLORIDE SERPL-SCNC: 105 MMOL/L (ref 94–109)
CO2 SERPL-SCNC: 27 MMOL/L (ref 20–32)
CREAT SERPL-MCNC: 1.06 MG/DL (ref 0.66–1.25)
GFR SERPL CREATININE-BSD FRML MDRD: 68 ML/MIN/1.7M2
GLUCOSE SERPL-MCNC: 222 MG/DL (ref 70–99)
POTASSIUM SERPL-SCNC: 5.3 MMOL/L (ref 3.4–5.3)
SODIUM SERPL-SCNC: 137 MMOL/L (ref 133–144)

## 2017-06-12 PROCEDURE — 80048 BASIC METABOLIC PNL TOTAL CA: CPT | Performed by: NURSE PRACTITIONER

## 2017-06-12 PROCEDURE — 36415 COLL VENOUS BLD VENIPUNCTURE: CPT | Performed by: NURSE PRACTITIONER

## 2017-06-12 PROCEDURE — 99214 OFFICE O/P EST MOD 30 MIN: CPT | Performed by: NURSE PRACTITIONER

## 2017-06-12 RX ORDER — FUROSEMIDE 20 MG
20 TABLET ORAL DAILY PRN
Qty: 90 TABLET | Refills: 3 | Status: SHIPPED | OUTPATIENT
Start: 2017-06-12 | End: 2018-12-11

## 2017-06-12 RX ORDER — TAMSULOSIN HYDROCHLORIDE 0.4 MG/1
0.4 CAPSULE ORAL DAILY
Qty: 90 CAPSULE | Refills: 3 | Status: SHIPPED | OUTPATIENT
Start: 2017-06-12 | End: 2018-08-20

## 2017-06-12 RX ORDER — INSULIN GLARGINE 100 [IU]/ML
20 INJECTION, SOLUTION SUBCUTANEOUS 2 TIMES DAILY
Qty: 3 VIAL | Refills: 11 | Status: SHIPPED | OUTPATIENT
Start: 2017-06-12 | End: 2018-03-26

## 2017-06-12 NOTE — PATIENT INSTRUCTIONS
1. Labs today  2. Follow up in 6 months with repeat A1C lab          Thank you for choosing [facility].  You may be receiving a survey in the mail from [employer] regarding your visit today.  Please take a few minutes to complete and return the survey to let us know how we are doing.      If you have questions or concerns, please contact us via [employer] or you can contact your care team at [phone].    Our Clinic hours are:  Monday 6:40 am  to 7:00 pm  Tuesday -Friday 6:40 am to 5:00 pm    The [place] outpatient lab hours are:  Monday - Friday 6:10 am to 4:45 pm  Saturdays 7:00 am to 11:00 am  Appointments are required, call [phone]    If you have clinical questions after hours or would like to schedule an appointment,  call the clinic at [phone].

## 2017-06-12 NOTE — MR AVS SNAPSHOT
After Visit Summary   6/12/2017    Orville Plasencia    MRN: [id number]           Patient Information     Date Of Birth          1945        Visit Information        Provider Department      6/12/2017 8:00 AM Anahi Sky APRN [facility]        Today's Diagnoses     Benign non-nodular prostatic hyperplasia, presence of lower urinary tract symptoms unspecified        Type 2 diabetes mellitus with hyperglycemia, with long-term current use of insulin (H)        Hypertension goal BP (blood pressure) < 140/90          Care Instructions    1. Labs today  2. Follow up in 6 months with repeat A1C lab          Thank you for choosing [facility].  You may be receiving a survey in the mail from [employer] regarding your visit today.  Please take a few minutes to complete and return the survey to let us know how we are doing.      If you have questions or concerns, please contact us via Plan[employer] or you can contact your care team at [phone].    Our Clinic hours are:  Monday 6:40 am  to 7:00 pm  Tuesday -Friday 6:40 am to 5:00 pm    The [place] outpatient lab hours are:  Monday - Friday 6:10 am to 4:45 pm  Saturdays 7:00 am to 11:00 am  Appointments are required, call [phone]    If you have clinical questions after hours or would like to schedule an appointment,  call the clinic at [phone].          Follow-ups after your visit        Your next 10 appointments already scheduled     Jun 13, 2017  9:30 AM CDT   TELEMEDICINE with Daria Awad [facility] ([facility])    [address] [facility] [address] [facility] [address] [phone]           Note: this is not an onsite visit; there is no need to come to the facility.              Future tests that were ordered for you today     Open Future Orders        Priority Expected Expires Ordered    Hemoglobin A1c Routine 11/9/2017 3/13/2018 6/12/2017            Who to contact      If you have questions or need follow up information about today's clinic visit or your schedule please contact [facility] directly at [phone].  Normal or non-critical lab and imaging results will be communicated to you by MyChart, letter or phone within 4 business days after the clinic has received the results. If you do not hear from us within 7 days, please contact the clinic through [employer] or phone. If you have a critical or abnormal lab result, we will notify you by phone as soon as possible.  Submit refill requests through [employer] or call your pharmacy and they will forward the refill request to us. Please allow 3 business days for your refill to be completed.          Additional Information About Your Visit        T-VIPSharIN[employer]TA Information     [employer] gives you secure access to your electronic health record. If you see a primary care provider, you can also send messages to your care team and make appointments. If you have questions, please call your primary care clinic.  If you do not have a primary care provider, please call [phone] and they will assist you.        Care EveryWhere ID     This is your Care EveryWhere ID. This could be used by other organizations to access your [place] medical records  [id number]        Your Vitals Were     Pulse Pulse Oximetry BMI (Body Mass Index)             57 95% 36.2 kg/m2          Blood Pressure from Last 3 Encounters:   06/12/17 140/80   03/01/17 126/72   02/21/17 121/77    Weight from Last 3 Encounters:   06/12/17 243 lb (110.2 kg)   02/21/17 237 lb (107.5 kg)   09/07/16 234 lb 9.6 oz (106.4 kg)              We Performed the Following     Basic metabolic panel          Today's Medication Changes          These changes are accurate as of: 6/12/17  8:22 AM.  If you have any questions, ask your nurse or doctor.               Start taking these medicines.        Dose/Directions    blood glucose monitoring meter device kit   Commonly known as:  no  brand specified   Used for:  Type 2 diabetes mellitus with hyperglycemia, with long-term current use of insulin (H)        Use to test blood sugar 3 times daily or as directed. Patient needs glucometer only to replace broken one. He will bring the monitor that he has for comparison to the pharmacy   Quantity:  1 kit   Refills:  0            Where to get your medicines      These medications were sent to [id number]  [place], [place] - [address]  [address], [place] [address]     Phone:  [phone]     furosemide 20 MG tablet    insulin aspart 100 UNIT/ML injection    insulin glargine 100 UNIT/ML injection    tamsulosin 0.4 MG capsule         Some of these will need a paper prescription and others can be bought over the counter.  Ask your nurse if you have questions.     Bring a paper prescription for each of these medications     blood glucose monitoring meter device kit                Primary Care Provider Office Phone # Fax #    Anahi Wren JAHAIRA Sky [facility] [phone]       [facility] [address] [facility] [address]        Thank you!     Thank you for choosing [facility]  for your care. Our goal is always to provide you with excellent care. Hearing back from our patients is one way we can continue to improve our services. Please take a few minutes to complete the written survey that you may receive in the mail after your visit with us. Thank you!             Your Updated Medication List - Protect others around you: Learn how to safely use, store and throw away your medicines at www.disposemymeds.org.          This list is accurate as of: 6/12/17  8:22 AM.  Always use your most recent med list.                   Brand Name Dispense Instructions for use    ASPIRIN NOT PRESCRIBED    INTENTIONAL    0 each    Please choose reason not prescribed, below       blood glucose lancing device     1 each    Device to be used with lancets.       blood glucose  "monitoring lancets     3 Box    Use to test blood sugar 3 times daily       blood glucose monitoring meter device kit    no brand specified    1 kit    Use to test blood sugar 3 times daily or as directed. Patient needs glucometer only to replace broken one. He will bring the monitor that he has for comparison to the pharmacy       blood glucose monitoring test strip    ACCU-CHEK COMPACT DRUM    3 Box    Use to test blood sugars four times daily 250.00 insulin dependant       furosemide 20 MG tablet    LASIX    90 tablet    Take 1 tablet (20 mg) by mouth daily as needed       insulin aspart 100 UNIT/ML injection    NovoLOG FLEXPEN    100 mL    Take 7 units of Novolog along with using sliding scale with breakfast, lunch and supper.       insulin glargine 100 UNIT/ML injection    LANTUS    3 vial    Inject 20 Units Subcutaneous 2 times daily       insulin pen needle 31G X 6 MM    ULTICARE MINI    300 each    Use 3 times daily or as directed.       insulin syringe-needle U-100 30G X 1/2\" 0.5 ML    BD insulin syringe ULTRAFINE    300 each    Use one syringe three daily or as directed.       STATIN NOT PRESCRIBED (INTENTIONAL)     0 each    Statin not prescribed intentionally due to Allergy       tamsulosin 0.4 MG capsule    FLOMAX    90 capsule    Take 1 capsule (0.4 mg) by mouth daily         "

## 2017-06-12 NOTE — PROGRESS NOTES
SUBJECTIVE:                                                    Orville Plasencia is a 72 year old male who presents to clinic today for the following health issues:      Diabetes Follow-up    Patient is checking blood sugars: 3 times daily.    Blood sugar testing frequency justification: Uncontrolled diabetes  Results are as follows:             Diabetic concerns: None and blood sugar frequently over 200, patient saw MTM last week and will be following up this week for adjustments     Symptoms of hypoglycemia (low blood sugar): none     Paresthesias (numbness or burning in feet) or sores: No     Date of last diabetic eye exam: one year ago, will schedule       Amount of exercise or physical activity: daily garden work    Problems taking medications regularly: No    Medication side effects: none    Diet: regular (no restrictions)      Medication Followup of Flomax    Taking Medication as prescribed: yes    Side Effects:  None    Medication Helping Symptoms:  yes       Problem list and histories reviewed & adjusted, as indicated.  Additional history: as documented    Patient Active Problem List   Diagnosis     Sensorineural hearing loss     RADHA (obstructive sleep apnea) with REM related hypoventilation-Moderate (AHI 26)     Advanced directives, counseling/discussion     GERD (gastroesophageal reflux disease)     Generalized weakness     Anasarca     SVT (supraventricular tachycardia) (H)     Necrotizing pancreatitis     Atrial fibrillation (H)     Anemia due to blood loss, acute     Health Care Home     Pseudoaneurysm (H)     Diastolic heart failure (H)     Chronic pancreatitis (H)     Pancreatitis, gallstone     Type 2 diabetes mellitus with hyperglycemia (H)     Morbid obesity due to excess calories (H)     Herpes zoster without complication     Hyperlipidemia LDL goal <70     Past Surgical History:   Procedure Laterality Date     ENDOSCOPIC INSERTION TUBE GASTROSTOMY  9/20/2013    Procedure: ENDOSCOPIC  INSERTION TUBE GASTROSTOMY;  Exchange of Gastrojejunostomy Tube , dilation of cyst gastrostomy, endoscopic pancreatocolengiogram with dilation of cyst gastrostomy and stent removal, exchange of cyst duodenostomy stent;  Surgeon: Negro Munguia MD;  [facility]: [facility]     ENDOSCOPIC INSERTION TUBE JEJUNOSTOMY  8/19/2013    Procedure: ENDOSCOPIC INSERTION TUBE JEJUNOSTOMY;;  Surgeon: Drew Palacios MD;  [facility]     ENDOSCOPIC RETROGRADE CHOLANGIOPANCREATOGRAM  8/19/2013    Procedure: ENDOSCOPIC RETROGRADE CHOLANGIOPANCREATOGRAM;;  Surgeon: Drew Palacios MD;  [facility]     ENDOSCOPIC RETROGRADE CHOLANGIOPANCREATOGRAM  9/12/2013    Procedure: ENDOSCOPIC RETROGRADE CHOLANGIOPANCREATOGRAM;  Endoscopic Retrograde Cholangiopancreatogram with necrosectomy, dilation of wall of necrosis, stent removal x 4 and gastro-jejunal tube placement.;  Surgeon: Negro Munguia MD;  [facility]     ENDOSCOPIC RETROGRADE CHOLANGIOPANCREATOGRAM  10/7/2013    Procedure: ENDOSCOPIC RETROGRADE CHOLANGIOPANCREATOGRAM;  endoscopic retrograde cholangiopancreatogram, cyst gastrostomy stent exchange, necrosectomy.;  Surgeon: Drew Palacios MD;  [facility]     ENDOSCOPIC RETROGRADE CHOLANGIOPANCREATOGRAM  11/18/2013    Procedure: ENDOSCOPIC RETROGRADE CHOLANGIOPANCREATOGRAM;  endoscopic retrograde cholangiopancreatogram with necrosectomy;  Surgeon: Drew Palacios MD;  [facility]     ENDOSCOPIC RETROGRADE CHOLANGIOPANCREATOGRAM  3/12/2014    Procedure: ENDOSCOPIC RETROGRADE CHOLANGIOPANCREATOGRAM;  Endoscopic Retrograde Cholangiopancreatogram with Stent Placement in Cyst Gastrostomy;  Surgeon: Winter Bowden MD;  [facility]     ENDOSCOPIC RETROGRADE CHOLANGIOPANCREATOGRAPHY, LITHOTRIPSY PANCREAS, COMBINED  9/19/2013    Procedure: COMBINED ENDOSCOPIC RETROGRADE CHOLANGIOPANCREATOGRAPHY, LITHOTRIPSY PANCREAS;  Endoscopic Retrograde Cholangiopancreatogram, placement of 2 stents in cyst gastrostomy;   Surgeon: Drew Palacios MD;  [facility]     ENDOSCOPIC ULTRASOUND UPPER GASTROINTESTINAL TRACT (GI)  7/22/2013    Procedure: ENDOSCOPIC ULTRASOUND UPPER GASTROINTESTINAL TRACT (GI);  Endoscopic Ultrasound;  Surgeon: Drew Palacios MD;  [facility]     ENDOSCOPIC ULTRASOUND UPPER GASTROINTESTINAL TRACT (GI)  8/19/2013    Procedure: ENDOSCOPIC ULTRASOUND UPPER GASTROINTESTINAL TRACT (GI);  Endoscopic ultrasound with fine needle aspiration,  Endorscopic retrograde cholangiopancreatogram with cystgastrostomy,       ENDOSCOPIC ULTRASOUND UPPER GASTROINTESTINAL TRACT (GI)  9/19/2013    Procedure: ENDOSCOPIC ULTRASOUND UPPER GASTROINTESTINAL TRACT (GI);  Endoscopic Ultrasound of Upper Gastrointestinal Tract;  Surgeon: Drew Palacios MD;  [facility]     ENDOSCOPIC ULTRASOUND UPPER GASTROINTESTINAL TRACT (GI)  8/7/2014    Procedure: ENDOSCOPIC ULTRASOUND, ESOPHAGOSCOPY / UPPER GASTROINTESTINAL TRACT (GI);  Surgeon: Adrian Plaza MD;  [facility]     ESOPHAGOSCOPY, GASTROSCOPY, DUODENOSCOPY (EGD), COMBINED  12/17/2013    Procedure: COMBINED ESOPHAGOSCOPY, GASTROSCOPY, DUODENOSCOPY (EGD);  EGD with stent removal and GJ tube replacement;  Surgeon: Negro Munguia MD;  [facility]     ESOPHAGOSCOPY, GASTROSCOPY, DUODENOSCOPY (EGD), COMBINED  12/14/2013    Procedure: COMBINED ESOPHAGOSCOPY, GASTROSCOPY, DUODENOSCOPY (EGD);;  Surgeon: Winter Bowden MD;  [facility]     ESOPHAGOSCOPY, GASTROSCOPY, DUODENOSCOPY (EGD), COMBINED  7/29/2014    Procedure: COMBINED ESOPHAGOSCOPY, GASTROSCOPY, DUODENOSCOPY (EGD);  Surgeon: Negro Munguia MD;  [facility]     GASTROSTOMY TUBE       HC COLONOSCOPY THRU STOMA, DIAGNOSTIC  2007    Normal     LAPAROSCOPIC CHOLECYSTECTOMY  7/29/2014    Procedure: LAPAROSCOPIC CHOLECYSTECTOMY;  Surgeon: Olga Bruner MD;  Location: [facility]     PICC INSERTION  12/20/2013    5fr DL Power PICC, 41cm (1cm external) in the R basilic vein w/ tip in the   mid [facility]       Social History   Substance Use Topics     Smoking status: Never Smoker     Smokeless tobacco: Never Used     Alcohol use Yes      Comment: rare     Family History   Problem Relation Age of Onset     DIABETES Mother      Eye Disorder Mother      blind from Diab.     HEART DISEASE Mother      Obesity Mother      Alcohol/Drug Father      DIABETES Maternal Grandmother      Eye Disorder Brother      Macular Degeneration     CANCER Sister      Brain cancer     C.A.D. Brother      2 bothers 1 sister in 60s     Cancer - colorectal No family hx of            Reviewed and updated as needed this visit by clinical staff       Reviewed and updated as needed this visit by Provider         ROS:  Constitutional, HEENT, cardiovascular, pulmonary, GI, , musculoskeletal, neuro, skin, endocrine and psych systems are negative, except as otherwise noted.    OBJECTIVE:                                                    /80  Pulse 57  Wt 243 lb (110.2 kg)  SpO2 95%  BMI 36.2 kg/m2  Body mass index is 36.2 kg/(m^2).  GENERAL: alert, no distress and over weight  NECK: no adenopathy, no asymmetry, masses, or scars and thyroid normal to palpation  RESP: lungs clear to auscultation - no rales, rhonchi or wheezes  CV: regular rate and rhythm, normal S1 S2, no S3 or S4, no murmur, click or rub, no peripheral edema and peripheral pulses strong  MS: no gross musculoskeletal defects noted, no edema    Diagnostic Test Results:  none      ASSESSMENT/PLAN:                                                      1. Benign non-nodular prostatic hyperplasia, presence of lower urinary tract symptoms unspecified  controlled  - tamsulosin (FLOMAX) 0.4 MG capsule; Take 1 capsule (0.4 mg) by mouth daily  Dispense: 90 capsule; Refill: 3    2. Type 2 diabetes mellitus with hyperglycemia, with long-term current use of insulin (H)  Improved A1C from 9.0 to 7.7  - insulin glargine (LANTUS) 100 UNIT/ML injection; Inject 20 Units Subcutaneous  2 times daily  Dispense: 3 vial; Refill: 11  - insulin aspart (NOVOLOG FLEXPEN) 100 UNIT/ML injection; Take 7 units of Novolog along with using sliding scale with breakfast, lunch and supper.  Dispense: 100 mL; Refill: 3  - blood glucose monitoring (NO BRAND SPECIFIED) meter device kit; Use to test blood sugar 3 times daily or as directed. Patient needs glucometer only to replace broken one. He will bring the monitor that he has for comparison to the pharmacy  Dispense: 1 kit; Refill: 0  - Hemoglobin A1c; Future    3. Hypertension goal BP (blood pressure) < 140/90  controlled  - Refilled furosemide (LASIX) 20 MG tablet; Take 1 tablet (20 mg) by mouth daily as needed  Dispense: 90 tablet; Refill: 3  - Basic metabolic panel      JAHAIRA Perdue [facility]

## 2017-06-12 NOTE — NURSING NOTE
"Initial /80 (BP Location: Left arm, Patient Position: Chair, Cuff Size: Adult Large)  Pulse 57  Wt 243 lb (110.2 kg)  SpO2 95%  BMI 36.2 kg/m2 Estimated body mass index is 36.2 kg/(m^2) as calculated from the following:    Height as of 2/21/17: 5' 8.7\" (1.745 m).    Weight as of this encounter: 243 lb (110.2 kg). .    Heidi Ortiz    "

## 2017-06-12 NOTE — LETTER
[facility]  [address] [facility] [address]  Phone: [phone]    June 13, 2017    Orville Plasencia  [address] [address] [place] [address]          Dear Mr. Plasencia,    The results of your recent lab tests were:    Labs stable- glucose elevated due to DIABETES MELLITUS.        Enclosed is a copy of these results.  If you have any further questions or problems, please contact our office.         Sincerely,      Anahi Sky NP / KELECHI

## 2017-06-12 NOTE — NURSING NOTE
"Initial /80  Pulse 57  Wt 243 lb (110.2 kg)  SpO2 95%  BMI 36.2 kg/m2 Estimated body mass index is 36.2 kg/(m^2) as calculated from the following:    Height as of 2/21/17: 5' 8.7\" (1.745 m).    Weight as of this encounter: 243 lb (110.2 kg). .    Heidi Ortiz    "

## 2017-06-13 ENCOUNTER — ALLIED HEALTH/NURSE VISIT (OUTPATIENT)
Dept: PHARMACY | Facility: CLINIC | Age: 72
End: 2017-06-13
Payer: COMMERCIAL

## 2017-06-13 DIAGNOSIS — Z79.4 TYPE 2 DIABETES MELLITUS WITH HYPERGLYCEMIA, WITH LONG-TERM CURRENT USE OF INSULIN (H): Primary | ICD-10-CM

## 2017-06-13 DIAGNOSIS — E11.65 TYPE 2 DIABETES MELLITUS WITH HYPERGLYCEMIA, WITH LONG-TERM CURRENT USE OF INSULIN (H): Primary | ICD-10-CM

## 2017-06-13 PROCEDURE — 99606 MTMS BY PHARM EST 15 MIN: CPT | Performed by: PHARMACIST

## 2017-06-13 NOTE — MR AVS SNAPSHOT
After Visit Summary   6/13/2017    Orville Plasencia    MRN: [id number]           Patient Information     Date Of Birth          1945        Visit Information        Provider Department      6/13/2017 9:30 AM Daria Awad [facility] [place]        Care Instructions    1. Continue current drug therapy.    We should follow up in 6 months.     Daria Awad, PharmD  [phone] in clinic on Tuesday and Wednesday            Follow-ups after your visit        Future tests that were ordered for you today     Open Future Orders        Priority Expected Expires Ordered    Hemoglobin A1c Routine 11/9/2017 3/13/2018 6/12/2017            Who to contact     If you have questions or need follow up information about today's clinic visit or your schedule please contact [facility] [place] directly at [phone].  Normal or non-critical lab and imaging results will be communicated to you by MyChart, letter or phone within 4 business days after the clinic has received the results. If you do not hear from us within 7 days, please contact the clinic through [employer] or phone. If you have a critical or abnormal lab result, we will notify you by phone as soon as possible.  Submit refill requests through [employer] or call your pharmacy and they will forward the refill request to us. Please allow 3 business days for your refill to be completed.          Additional Information About Your Visit        MyChart Information     [employer] gives you secure access to your electronic health record. If you see a primary care provider, you can also send messages to your care team and make appointments. If you have questions, please call your primary care clinic.  If you do not have a primary care provider, please call [phone] and they will assist you.        Care EveryWhere ID     This is your Care EveryWhere ID. This could be used by other organizations to access your [place]  medical records  [id number]         Blood Pressure from Last 3 Encounters:   06/12/17 128/80   03/01/17 126/72   02/21/17 121/77    Weight from Last 3 Encounters:   06/12/17 243 lb (110.2 kg)   02/21/17 237 lb (107.5 kg)   09/07/16 234 lb 9.6 oz (106.4 kg)              Today, you had the following     No orders found for display       Primary Care Provider Office Phone # Fax #    Anahi Johnsondaisha JAHAIRA [facility] [phone]       [facility] [address] [facility] [address]        Thank you!     Thank you for choosing [facility]  for your care. Our goal is always to provide you with excellent care. Hearing back from our patients is one way we can continue to improve our services. Please take a few minutes to complete the written survey that you may receive in the mail after your visit with us. Thank you!             Your Updated Medication List - Protect others around you: Learn how to safely use, store and throw away your medicines at www.disposemymeds.org.          This list is accurate as of: 6/13/17 10:45 AM.  Always use your most recent med list.                   Brand Name Dispense Instructions for use    ASPIRIN NOT PRESCRIBED    INTENTIONAL    0 each    Please choose reason not prescribed, below       blood glucose lancing device     1 each    Device to be used with lancets.       blood glucose monitoring lancets     3 Box    Use to test blood sugar 3 times daily       blood glucose monitoring meter device kit    no brand specified    1 kit    Use to test blood sugar 3 times daily or as directed. Patient needs glucometer only to replace broken one. He will bring the monitor that he has for comparison to the pharmacy       blood glucose monitoring test strip    ACCU-CHEK COMPACT DRUM    3 Box    Use to test blood sugars four times daily 250.00 insulin dependant       furosemide 20 MG tablet    LASIX    90 tablet    Take 1 tablet (20 mg) by mouth daily as needed     "   insulin aspart 100 UNIT/ML injection    NovoLOG FLEXPEN    100 mL    Take 7 units of Novolog along with using sliding scale with breakfast, lunch and supper.       insulin glargine 100 UNIT/ML injection    LANTUS    3 vial    Inject 20 Units Subcutaneous 2 times daily       insulin pen needle 31G X 6 MM    ULTICARE MINI    300 each    Use 3 times daily or as directed.       insulin syringe-needle U-100 30G X 1/2\" 0.5 ML    BD insulin syringe ULTRAFINE    300 each    Use one syringe three daily or as directed.       STATIN NOT PRESCRIBED (INTENTIONAL)     0 each    Statin not prescribed intentionally due to Allergy       tamsulosin 0.4 MG capsule    FLOMAX    90 capsule    Take 1 capsule (0.4 mg) by mouth daily         "

## 2017-06-13 NOTE — PROGRESS NOTES
SUBJECTIVE/OBJECTIVE:                Orville Plasencia is a 72 year old male called for a follow-up visit for Medication Therapy Management.  He was referred to me from An[facility] Sky. I talked with patient's wife Socorro, I was verbally asked by patient to follow up with Socorro for his blood sugars on all of our follow up visits, also consent to communicate signed on 07/23/2013.    Chief Complaint:  A1C elevated. Follow up from our visit on 5/30/17    Allergies/ADRs: Reviewed in Epic  Tobacco: No tobacco use   Alcohol: not currently using    PMH: Reviewed in Epic    Diabetes:  Pt currently taking Lantus 20 units twice daily. Pt also taking Novolog 7 units with meals plus sliding scale (Sliding scale reported by patient spouse is as follows: (0-140 - nothing; 141-180 +1 unit; 181-220 +2 units; 221-260 +3 units; 261-300 +4 units: 301-240 +5 units; >340 +6 units). Pt is not experiencing side effects. Pt is not counting carbs.   SMBG: three times daily.  Ranges (patient reported): mornings: 128,195,154,90,161,125,184,182,133,74,143,127,105,110; before lunch: 139,106,180,103,142,181,70,69,115,194,156,115,133; before dinner: 91,96,138,167,152,191,87,126,78,189,166,132,101.    Foot exam: up to date  Microalbumin is < 30 mg/g. Pt is not taking an ACEi/ARB.  Aspirin: Not taking due to GI bleed.  Immunization: Did not receive flu vaccine this year, has had both pneumonia vaccines.    Current labs include:  BP Readings from Last 3 Encounters:   06/12/17 128/80   03/01/17 126/72   02/21/17 121/77     Today's Vitals: There were no vitals taken for this visit.  Lab Results   Component Value Date    A1C 7.7 06/09/2017   .  Lab Results   Component Value Date    CHOL 120 02/21/2017     Lab Results   Component Value Date    TRIG 88 02/21/2017     Lab Results   Component Value Date    HDL 40 02/21/2017     Lab Results   Component Value Date    LDL 62 02/21/2017       Liver Function Studies -   Recent Labs   Lab Test  02/21/17   0816    PROTTOTAL  8.7   ALBUMIN  3.9   BILITOTAL  0.5   ALKPHOS  183*   AST  27   ALT  64       Lab Results   Component Value Date    UCRR 139 02/21/2017    MICROL 82 02/21/2017    UMALCR 58.63 (H) 02/21/2017       Last Basic Metabolic Panel:  Lab Results   Component Value Date     06/12/2017      Lab Results   Component Value Date    POTASSIUM 5.3 06/12/2017     Lab Results   Component Value Date    CHLORIDE 105 06/12/2017     Lab Results   Component Value Date    BUN 20 06/12/2017     Lab Results   Component Value Date    CR 1.06 06/12/2017     GFR Estimate   Date Value Ref Range Status   06/12/2017 68 >60 mL/min/1.7m2 Final     Comment:     Non  GFR Calc   02/21/2017 61 >60 mL/min/1.7m2 Final     Comment:     Non  GFR Calc   09/07/2016 63 >60 mL/min/1.7m2 Final     Comment:     Non  GFR Calc     GFR Estimate If Black   Date Value Ref Range Status   06/12/2017 83 >60 mL/min/1.7m2 Final     Comment:      GFR Calc   02/21/2017 73 >60 mL/min/1.7m2 Final     Comment:      GFR Calc   09/07/2016 76 >60 mL/min/1.7m2 Final     Comment:      GFR Calc     TSH   Date Value Ref Range Status   09/07/2016 1.16 0.40 - 4.00 mU/L Final   ]    Most Recent Immunizations   Administered Date(s) Administered     Influenza (High Dose) 3 valent vaccine 12/03/2014     Pneumococcal (PCV 13) 12/03/2014     Pneumococcal 23 valent 09/20/2013     TDAP Vaccine (Adacel) 10/25/2007       ASSESSMENT:              Current medications were reviewed today as discussed above.      Medication Adherence: no issues identified    Diabetes: Improved. Patient is meeting A1c goal of < 8%. His blood sugars have improved all blood sugars <200. Still some fluctuation based on exercise level - overall blood sugars are much improved.     PLAN:                  1. Continue current drug therapy.     I spent 15 minutes with this patient today.  All changes were made via  collaborative practice agreement with Asya, Anahi Wren. A copy of the visit note was provided to the patient's primary care provider.     Will follow up in 6 months.    The patient declined a summary of these recommendations as an after visit summary.    Daria Awad, PharmD  Medication Therapy Management

## 2017-06-13 NOTE — PATIENT INSTRUCTIONS
1. Continue current drug therapy.    We should follow up in 6 months.     Daria Awad, PharmD  [phone] in clinic on Tuesday and Wednesday

## 2017-07-14 ENCOUNTER — TELEPHONE (OUTPATIENT)
Dept: FAMILY MEDICINE | Facility: CLINIC | Age: 72
End: 2017-07-14

## 2017-07-14 DIAGNOSIS — E11.65 TYPE 2 DIABETES MELLITUS WITH HYPERGLYCEMIA, WITH LONG-TERM CURRENT USE OF INSULIN (H): ICD-10-CM

## 2017-07-14 DIAGNOSIS — Z79.4 TYPE 2 DIABETES MELLITUS WITH HYPERGLYCEMIA, WITH LONG-TERM CURRENT USE OF INSULIN (H): ICD-10-CM

## 2017-07-14 NOTE — TELEPHONE ENCOUNTER
The pharmacy has been notified of the clarification. And has been able to fill that medication.     Zaina Davenport RN

## 2017-07-14 NOTE — TELEPHONE ENCOUNTER
Pharmacy has called for clarification on Novolog max daily dose. They need an order stating the max daily dose.   I have spoke to the pt's wife she reports that pt gets 7 units with meals plus sliding scale. She reports that he typically gives 1-3 units of sliding scale.   I does not appear that there is a clear order for sliding scale. Pt was seen in an MTM visit on 6/13/17 and the sliding scale used by the pt was documented in the visit. See those notes below.    Pt currently taking Lantus 20 units twice daily. Pt also taking Novolog 7 units with meals plus sliding scale (Sliding scale reported by patient spouse is as follows: (0-140 - nothing; 141-180 +1 unit; 181-220 +2 units; 221-260 +3 units; 261-300 +4 units: 301-240 +5 units; >340 +6 units).     I will route to the provider pool for clarification of this order.   Zaina Davenport RN

## 2017-07-14 NOTE — TELEPHONE ENCOUNTER
Pt was last seen 6/12/17 for uncontrolled diabetes.  Pt uses 7 units with meals plus sliding scale.   Pt recently met with pharm-D.  No changes.  Looks like wife, Socorro, administers insulin.    Need to know how much Novolog pt is using each day once the sliding scale amounts are added to the daily intake?  Left message for patient or wife to return a call to the clinic ALEKSANDRA Beasley RN

## 2017-07-14 NOTE — TELEPHONE ENCOUNTER
Covering for PCP:  Rx updated- max dose of 13 units with each injection (7 units plus up to 6 units on sliding scale).    Shad Bucio MD

## 2017-07-14 NOTE — TELEPHONE ENCOUNTER
Farideh Hodges calling to clarify what max daily dose is for Novolog, Please call Jannet at pharmacy

## 2017-09-06 NOTE — PROGRESS NOTES
Drug therapy problem resolved. Spoke with Anahi Sky and she agreed to change furosemide dosing to as needed for edema.    Roxana Santos, Pharm.D. Student

## 2017-12-05 DIAGNOSIS — E11.51 TYPE 2 DIABETES MELLITUS WITH DIABETIC PERIPHERAL ANGIOPATHY WITHOUT GANGRENE (H): ICD-10-CM

## 2017-12-30 DIAGNOSIS — E11.65 TYPE 2 DIABETES MELLITUS WITH HYPERGLYCEMIA, WITH LONG-TERM CURRENT USE OF INSULIN (H): ICD-10-CM

## 2017-12-30 DIAGNOSIS — Z79.4 TYPE 2 DIABETES MELLITUS WITH HYPERGLYCEMIA, WITH LONG-TERM CURRENT USE OF INSULIN (H): ICD-10-CM

## 2017-12-30 NOTE — TELEPHONE ENCOUNTER
Diabetic Supplies Protocol [id number] 8:21 AM   Patient has had appt within past 6 mos     Medication is being filled for 1 time refill only due to:  due for recheck per protocol.  Samai Bingham RNC

## 2018-02-21 ENCOUNTER — TELEPHONE (OUTPATIENT)
Dept: FAMILY MEDICINE | Facility: CLINIC | Age: 73
End: 2018-02-21

## 2018-02-21 NOTE — TELEPHONE ENCOUNTER
Pt is due for a colon cancer screening. Last colonoscopy was in 2007. Left a message for patient to return call.  Sondra Nesbitt, CMA

## 2018-02-28 NOTE — TELEPHONE ENCOUNTER
(1st attempt) Left a voice msg for pt to call back.  When pt calls back TRANSFER to the CARE TEAM so the staff can address msg below.  FYI: Pt needs orders  Emma Montano CMA (TRENA)   (aka: Ruby Montano)

## 2018-03-07 NOTE — TELEPHONE ENCOUNTER
(1st attempt) Left a voice msg for pt to call back.  When pt calls back help schedule fasting labs + diabetes office visit follow up + Eye exam + Colonoscopy   FYI: Pt needs orders for Colonoscopy - Transfer to the care team to address colonoscopy  Emma Montano CMA (AAMA)   (aka: Ruby Montano)

## 2018-03-09 ENCOUNTER — TELEPHONE (OUTPATIENT)
Dept: PEDIATRICS | Facility: CLINIC | Age: 73
End: 2018-03-09

## 2018-03-09 NOTE — TELEPHONE ENCOUNTER
Spoke with Socorro - asking if pt needs to fast for lab draw Tuesday. Advised no, only lab that is ordered and appears to be due is the Hgb A1c.    Gabriela STEVE RN

## 2018-03-09 NOTE — TELEPHONE ENCOUNTER
Wife called requesting lab work for tuesdays lab draw. Pt has appt for Thursday     Dasha OROURKE  Station

## 2018-03-13 DIAGNOSIS — E11.65 TYPE 2 DIABETES MELLITUS WITH HYPERGLYCEMIA, WITH LONG-TERM CURRENT USE OF INSULIN (H): ICD-10-CM

## 2018-03-13 DIAGNOSIS — Z79.4 TYPE 2 DIABETES MELLITUS WITH HYPERGLYCEMIA, WITH LONG-TERM CURRENT USE OF INSULIN (H): ICD-10-CM

## 2018-03-13 LAB — HBA1C MFR BLD: 8 % (ref 4.3–6)

## 2018-03-13 PROCEDURE — 83036 HEMOGLOBIN GLYCOSYLATED A1C: CPT | Performed by: NURSE PRACTITIONER

## 2018-03-13 PROCEDURE — 36416 COLLJ CAPILLARY BLOOD SPEC: CPT | Performed by: NURSE PRACTITIONER

## 2018-03-15 ENCOUNTER — OFFICE VISIT (OUTPATIENT)
Dept: FAMILY MEDICINE | Facility: CLINIC | Age: 73
End: 2018-03-15
Payer: COMMERCIAL

## 2018-03-15 VITALS
SYSTOLIC BLOOD PRESSURE: 137 MMHG | BODY MASS INDEX: 35.55 KG/M2 | TEMPERATURE: 97.1 F | WEIGHT: 240 LBS | OXYGEN SATURATION: 96 % | HEART RATE: 86 BPM | HEIGHT: 69 IN | DIASTOLIC BLOOD PRESSURE: 75 MMHG

## 2018-03-15 DIAGNOSIS — E11.65 TYPE 2 DIABETES MELLITUS WITH HYPERGLYCEMIA, WITH LONG-TERM CURRENT USE OF INSULIN (H): Primary | ICD-10-CM

## 2018-03-15 DIAGNOSIS — Z23 NEED FOR PROPHYLACTIC VACCINATION WITH TETANUS-DIPHTHERIA (TD): ICD-10-CM

## 2018-03-15 DIAGNOSIS — Z79.4 TYPE 2 DIABETES MELLITUS WITH HYPERGLYCEMIA, WITH LONG-TERM CURRENT USE OF INSULIN (H): Primary | ICD-10-CM

## 2018-03-15 DIAGNOSIS — Z12.11 SPECIAL SCREENING FOR MALIGNANT NEOPLASMS, COLON: ICD-10-CM

## 2018-03-15 DIAGNOSIS — E78.5 HYPERLIPIDEMIA LDL GOAL <70: ICD-10-CM

## 2018-03-15 PROCEDURE — 90471 IMMUNIZATION ADMIN: CPT | Performed by: NURSE PRACTITIONER

## 2018-03-15 PROCEDURE — 90714 TD VACC NO PRESV 7 YRS+ IM: CPT | Performed by: NURSE PRACTITIONER

## 2018-03-15 PROCEDURE — 99207 C FOOT EXAM  NO CHARGE: CPT | Performed by: NURSE PRACTITIONER

## 2018-03-15 PROCEDURE — 99214 OFFICE O/P EST MOD 30 MIN: CPT | Mod: 25 | Performed by: NURSE PRACTITIONER

## 2018-03-15 NOTE — PROGRESS NOTES
SUBJECTIVE:   Orville Plasencia is a 72 year old male who presents to clinic today for the following health issues:      Diabetes Follow-up    Patient is checking blood sugars: twice daily.    Blood sugar testing frequency justification: Uncontrolled diabetes  Results are as follows:         52 to 400 - Pt is taking 2 to 3 times a day    Am around 90's to 120    Lunch - 100- 200    Dinner : 200 - 400 (the 400 is very unusual pt verbalized)    Diabetic concerns: None     Symptoms of hypoglycemia (low blood sugar): none     Paresthesias (numbness or burning in feet) or sores: No     Date of last diabetic eye exam: duo for it.    BP Readings from Last 2 Encounters:   06/12/17 128/80   03/01/17 126/72     Hemoglobin A1C (%)   Date Value   03/13/2018 8.0 (H)   06/09/2017 7.7 (H)     LDL Cholesterol Calculated (mg/dL)   Date Value   02/21/2017 62   02/18/2016 50     Hyperlipidemia Follow-Up      Rate your low fat/cholesterol diet?: poor    Taking statin?  Yes, no muscle aches from statin    Other lipid medications/supplements?:  none      Amount of exercise or physical activity: None    Problems taking medications regularly: No    Medication side effects: none    Diet: regular (no restrictions)        -------------------------------------    Problem list and histories reviewed & adjusted, as indicated.  Additional history: as documented    Patient Active Problem List   Diagnosis     Sensorineural hearing loss     RADHA (obstructive sleep apnea) with REM related hypoventilation-Moderate (AHI 26)     Advanced directives, counseling/discussion     GERD (gastroesophageal reflux disease)     Generalized weakness     Anasarca     SVT (supraventricular tachycardia) (H)     Necrotizing pancreatitis     Atrial fibrillation (H)     Anemia due to blood loss, acute     Health Care Home     Pseudoaneurysm (H)     Diastolic heart failure (H)     Chronic pancreatitis (H)     Pancreatitis, gallstone     Type 2 diabetes mellitus with  hyperglycemia (H)     Morbid obesity due to excess calories (H)     Herpes zoster without complication     Hyperlipidemia LDL goal <70     Past Surgical History:   Procedure Laterality Date     ENDOSCOPIC INSERTION TUBE GASTROSTOMY  9/20/2013    Procedure: ENDOSCOPIC INSERTION TUBE GASTROSTOMY;  Exchange of Gastrojejunostomy Tube , dilation of cyst gastrostomy, endoscopic pancreatocolengiogram with dilation of cyst gastrostomy and stent removal, exchange of cyst duodenostomy stent;  Surgeon: Negro Munguia MD;  Location: [facility] OR     ENDOSCOPIC INSERTION TUBE JEJUNOSTOMY  8/19/2013    Procedure: ENDOSCOPIC INSERTION TUBE JEJUNOSTOMY;;  Surgeon: Drew Palacios MD;  Location:  OR     ENDOSCOPIC RETROGRADE CHOLANGIOPANCREATOGRAM  8/19/2013    Procedure: ENDOSCOPIC RETROGRADE CHOLANGIOPANCREATOGRAM;;  Surgeon: Drew Palacios MD;  Location: [facility]     ENDOSCOPIC RETROGRADE CHOLANGIOPANCREATOGRAM  9/12/2013    Procedure: ENDOSCOPIC RETROGRADE CHOLANGIOPANCREATOGRAM;  Endoscopic Retrograde Cholangiopancreatogram with necrosectomy, dilation of wall of necrosis, stent removal x 4 and gastro-jejunal tube placement.;  Surgeon: Negro Munguia MD;  Location:  OR     ENDOSCOPIC RETROGRADE CHOLANGIOPANCREATOGRAM  10/7/2013    Procedure: ENDOSCOPIC RETROGRADE CHOLANGIOPANCREATOGRAM;  endoscopic retrograde cholangiopancreatogram, cyst gastrostomy stent exchange, necrosectomy.;  Surgeon: Drew Palacios MD;  Location:  OR     ENDOSCOPIC RETROGRADE CHOLANGIOPANCREATOGRAM  11/18/2013    Procedure: ENDOSCOPIC RETROGRADE CHOLANGIOPANCREATOGRAM;  endoscopic retrograde cholangiopancreatogram with necrosectomy;  Surgeon: Drew Palacios MD;  Location:  OR     ENDOSCOPIC RETROGRADE CHOLANGIOPANCREATOGRAM  3/12/2014    Procedure: ENDOSCOPIC RETROGRADE CHOLANGIOPANCREATOGRAM;  Endoscopic Retrograde Cholangiopancreatogram with Stent Placement in Cyst Gastrostomy;  Surgeon: Winter Bowden MD;  Location:  OR      ENDOSCOPIC RETROGRADE CHOLANGIOPANCREATOGRAPHY, LITHOTRIPSY PANCREAS, COMBINED  9/19/2013    Procedure: COMBINED ENDOSCOPIC RETROGRADE CHOLANGIOPANCREATOGRAPHY, LITHOTRIPSY PANCREAS;  Endoscopic Retrograde Cholangiopancreatogram, placement of 2 stents in cyst gastrostomy;  Surgeon: Drew Palacios MD;  Location: [facility]     ENDOSCOPIC ULTRASOUND UPPER GASTROINTESTINAL TRACT (GI)  7/22/2013    Procedure: ENDOSCOPIC ULTRASOUND UPPER GASTROINTESTINAL TRACT (GI);  Endoscopic Ultrasound;  Surgeon: Drew Palacios MD;  Location: [facility]     ENDOSCOPIC ULTRASOUND UPPER GASTROINTESTINAL TRACT (GI)  8/19/2013    Procedure: ENDOSCOPIC ULTRASOUND UPPER GASTROINTESTINAL TRACT (GI);  Endoscopic ultrasound with fine needle aspiration,  Endorscopic retrograde cholangiopancreatogram with cystgastrostomy,       ENDOSCOPIC ULTRASOUND UPPER GASTROINTESTINAL TRACT (GI)  9/19/2013    Procedure: ENDOSCOPIC ULTRASOUND UPPER GASTROINTESTINAL TRACT (GI);  Endoscopic Ultrasound of Upper Gastrointestinal Tract;  Surgeon: Drew Palacios MD;  Location: [facility]     ENDOSCOPIC ULTRASOUND UPPER GASTROINTESTINAL TRACT (GI)  8/7/2014    Procedure: ENDOSCOPIC ULTRASOUND, ESOPHAGOSCOPY / UPPER GASTROINTESTINAL TRACT (GI);  Surgeon: Adrian Plaza MD;  Location: [facility]     ESOPHAGOSCOPY, GASTROSCOPY, DUODENOSCOPY (EGD), COMBINED  12/17/2013    Procedure: COMBINED ESOPHAGOSCOPY, GASTROSCOPY, DUODENOSCOPY (EGD);  EGD with stent removal and GJ tube replacement;  Surgeon: Negro Munguia MD;  Location: [facility]     ESOPHAGOSCOPY, GASTROSCOPY, DUODENOSCOPY (EGD), COMBINED  12/14/2013    Procedure: COMBINED ESOPHAGOSCOPY, GASTROSCOPY, DUODENOSCOPY (EGD);;  Surgeon: Winter Bowden MD;  Location: [facility]     ESOPHAGOSCOPY, GASTROSCOPY, DUODENOSCOPY (EGD), COMBINED  7/29/2014    Procedure: COMBINED ESOPHAGOSCOPY, GASTROSCOPY, DUODENOSCOPY (EGD);  Surgeon: Negro Munguia MD;  Location: [facility]     GASTROSTOMY TUBE       HC COLONOSCOPY THRU STOMA,  "DIAGNOSTIC  2007    Normal     LAPAROSCOPIC CHOLECYSTECTOMY  7/29/2014    Procedure: LAPAROSCOPIC CHOLECYSTECTOMY;  Surgeon: Olga Bruner MD;  Location: [facility]     PICC INSERTION  12/20/2013    5fr DL Power PICC, 41cm (1cm external) in the R basilic vein w/ tip in the  mid SVC.       Social History   Substance Use Topics     Smoking status: Never Smoker     Smokeless tobacco: Never Used     Alcohol use Yes      Comment: rare     Family History   Problem Relation Age of Onset     DIABETES Mother      Eye Disorder Mother      blind from Diab.     HEART DISEASE Mother      Obesity Mother      Alcohol/Drug Father      DIABETES Maternal Grandmother      Eye Disorder Brother      Macular Degeneration     CANCER Sister      Brain cancer     C.A.D. Brother      2 bothers 1 sister in 60s     Cancer - colorectal No family hx of            Reviewed and updated as needed this visit by clinical staff       Reviewed and updated as needed this visit by Provider         ROS:  Constitutional, HEENT, cardiovascular, pulmonary, GI, , musculoskeletal, neuro, skin, endocrine and psych systems are negative, except as otherwise noted.    OBJECTIVE:     /75 (BP Location: Left arm, Patient Position: Chair, Cuff Size: Adult Large)  Pulse 86  Temp 97.1  F (36.2  C) (Tympanic)  Ht 5' 8.5\" (1.74 m)  Wt 240 lb (108.9 kg)  SpO2 96%  BMI 35.96 kg/m2  Body mass index is 35.96 kg/(m^2).  GENERAL: Over weight, alert and no distress  RESP: lungs clear to auscultation - no rales, rhonchi or wheezes  CV: regular rate and rhythm, normal S1 S2, no S3 or S4, no murmur, click or rub, no peripheral edema and peripheral pulses strong  MS: no gross musculoskeletal defects noted, no edema  Diabetic foot exam: normal DP and PT pulses, no trophic changes or ulcerative lesions, normal sensory exam and normal monofilament exam    Diagnostic Test Results:  none     ASSESSMENT/PLAN:       1. Type 2 diabetes mellitus with hyperglycemia, with " long-term current use of insulin (H)  A1C is at 8.0- would like it under 8.0- will continue current medications at this time- discussed diet control- over eating and checking BS before each meal as patient has not been doing this- will recheck A1C in 3 months   - Basic metabolic panel; Future  - Hemoglobin A1c; Future    2. Hyperlipidemia LDL goal <70  Tolerating statin- needs to update lipid panel  - Lipid panel reflex to direct LDL Fasting; Future  - Albumin Random Urine Quantitative with Creat Ratio; Future  - ALT; Future  - Basic metabolic panel; Future    3. Need for prophylactic vaccination with tetanus-diphtheria (TD)    - TD (ADULT, 7+) PRESERVE FREE    4. Special screening for malignant neoplasms, colon    - GASTROENTEROLOGY ADULT REF PROCEDURE ONLY    Follow up in 6 months    JAHAIRA [facility]

## 2018-03-15 NOTE — MR AVS SNAPSHOT
After Visit Summary   3/15/2018    Orville Plasencia    MRN: [id number]           Patient Information     Date Of Birth          1945        Visit Information        Provider Department      3/15/2018 8:20 AM Anahi Sky APRN [facility]        Today's Diagnoses     Type 2 diabetes mellitus with hyperglycemia, with long-term current use of insulin (H)    -  1    Screen for colon cancer        Screening for diabetic peripheral neuropathy        Need for prophylactic vaccination with tetanus-diphtheria (TD)        Hyperlipidemia LDL goal <70        Special screening for malignant neoplasms, colon          Care Instructions    1. Schedule fasting lab work   2. Schedule eye exam  3. Schedule colonoscopy   4. Recheck your A1C level in 3 months  5. Follow up with provider in 6 months          Thank you for choosing [facility].  You may be receiving a survey in the mail from [employer] [facility] regarding your visit today.  Please take a few minutes to complete and return the survey to let us know how we are doing.      If you have questions or concerns, please contact us via Netformx or you can contact your care team at [phone].    Our Clinic hours are:  Monday 6:40 am  to 7:00 pm  Tuesday -Friday 6:40 am to 5:00 pm    The [place] outpatient lab hours are:  Monday - Friday 6:10 am to 4:45 pm  Saturdays 7:00 am to 11:00 am  Appointments are required, call [phone]    If you have clinical questions after hours or would like to schedule an appointment,  call the clinic at [phone].          Follow-ups after your visit        Additional Services     GASTROENTEROLOGY ADULT REF PROCEDURE ONLY       Last Lab Result: Creatinine (mg/dL)       Date                     Value                 06/12/2017               1.06             ----------  Body mass index is 35.96 kg/(m^2).     Needed:  No  Language:  English    Patient will be contacted to schedule procedure.      Please be aware that coverage of these services is subject to the terms and limitations of your health insurance plan.  Call member services at your health plan with any benefit or coverage questions.  Any procedures must be performed at a [facility] facility OR coordinated by your clinic's referral office.    Please bring the following with you to your appointment:    (1) Any X-Rays, CTs or MRIs which have been performed.  Contact the facility where they were done to arrange for  prior to your scheduled appointment.    (2) List of current medications   (3) This referral request   (4) Any documents/labs given to you for this referral                  Future tests that were ordered for you today     Open Future Orders        Priority Expected Expires Ordered    Hemoglobin A1c Routine 5/14/2018 9/13/2018 3/15/2018    Lipid panel reflex to direct LDL Fasting Routine 3/16/2018 3/15/2019 3/15/2018    Albumin Random Urine Quantitative with Creat Ratio Routine 3/16/2018 3/15/2019 3/15/2018    ALT Routine  4/15/2018 3/15/2018    Basic metabolic panel Routine  4/15/2018 3/15/2018            Who to contact     If you have questions or need follow up information about today's clinic visit or your schedule please contact [facility] directly at [phone].  Normal or non-critical lab and imaging results will be communicated to you by Appiahart, letter or phone within 4 business days after the clinic has received the results. If you do not hear from us within 7 days, please contact the clinic through Appiahart or phone. If you have a critical or abnormal lab result, we will notify you by phone as soon as possible.  Submit refill requests through Inforama or call your pharmacy and they will forward the refill request to us. Please allow 3 business days for your refill to be completed.          Additional Information About Your Visit        Inforama Information     Inforama gives you secure access to your  "electronic health record. If you see a primary care provider, you can also send messages to your care team and make appointments. If you have questions, please call your primary care clinic.  If you do not have a primary care provider, please call [phone] and they will assist you.        Care EveryWhere ID     This is your Care EveryWhere ID. This could be used by other organizations to access your [place] medical records  [id number]        Your Vitals Were     Pulse Temperature Height Pulse Oximetry BMI (Body Mass Index)       86 97.1  F (36.2  C) (Tympanic) 5' 8.5\" (1.74 m) 96% 35.96 kg/m2        Blood Pressure from Last 3 Encounters:   03/15/18 137/75   06/12/17 128/80   03/01/17 126/72    Weight from Last 3 Encounters:   03/15/18 240 lb (108.9 kg)   06/12/17 243 lb (110.2 kg)   02/21/17 237 lb (107.5 kg)              We Performed the Following     FOOT EXAM  NO CHARGE [[id number]]     GASTROENTEROLOGY ADULT REF PROCEDURE ONLY     TD (ADULT, 7+) PRESERVE FREE        Primary Care Provider Office Phone # Fax #    Anahi Sky, APRN [facility] [phone] [address] [address]        Equal Access to Services     FABIÁN KIMBLE AH: Hadii grabiel ku hadasho Soomaali, waaxda luqadaha, qaybta kaalmada adeegyada, waxay domitilain hayshirleyn rocio doan . So [facility] [phone].    ATENCIÓN: Si habla español, tiene a hampton disposición servicios gratuitos de asistencia lingüística. Llame al [phone].    We comply with applicable federal civil rights laws and [place] laws. We do not discriminate on the basis of race, color, national origin, age, disability, sex, sexual orientation, or gender identity.            Thank you!     Thank you for choosing [facility]  for your care. Our goal is always to provide you with excellent care. Hearing back from our patients is one way we can continue to improve our services. Please take a few minutes to complete the written survey that you " may receive in the mail after your visit with us. Thank you!             Your Updated Medication List - Protect others around you: Learn how to safely use, store and throw away your medicines at www.disposemymeds.org.          This list is accurate as of 3/15/18  8:54 AM.  Always use your most recent med list.                   Brand Name Dispense Instructions for use Diagnosis    ACCU-CHEK COMPACT PLUS test strip   Generic drug:  blood glucose monitoring     400 strip    USE ONE STRIP TO CHECK GLUCOSE 4 TIMES DAILY    Type 2 diabetes mellitus with diabetic peripheral angiopathy without gangrene (H)       ASPIRIN NOT PRESCRIBED    INTENTIONAL    0 each    Please choose reason not prescribed, below    Type 2 diabetes mellitus with hyperglycemia, with long-term current use of insulin (H)       blood glucose lancing device     1 each    Device to be used with lancets.    Type 2 diabetes mellitus with hyperglycemia, with long-term current use of insulin (H)       blood glucose monitoring lancets     3 Box    Use to test blood sugar 3 times daily    Type 2 diabetes mellitus with hyperglycemia, with long-term current use of insulin (H)       blood glucose monitoring meter device kit    no brand specified    1 kit    Use to test blood sugar 3 times daily or as directed. Patient needs glucometer only to replace broken one. He will bring the monitor that he has for comparison to the pharmacy    Type 2 diabetes mellitus with hyperglycemia, with long-term current use of insulin (H)       furosemide 20 MG tablet    LASIX    90 tablet    Take 1 tablet (20 mg) by mouth daily as needed    Hypertension goal BP (blood pressure) < 140/90       insulin aspart 100 UNIT/ML injection    NovoLOG FLEXPEN    100 mL    Take 7 units of Novolog along with using sliding scale (max total dose 13 units each injection) with breakfast, lunch and supper.    Type 2 diabetes mellitus with hyperglycemia, with long-term current use of insulin (H)        "insulin glargine 100 UNIT/ML injection    LANTUS    3 vial    Inject 20 Units Subcutaneous 2 times daily    Type 2 diabetes mellitus with hyperglycemia, with long-term current use of insulin (H)       insulin pen needle 31G X 6 MM    ULTICARE MINI    300 each    Use 3 times daily or as directed.    Type 2 diabetes mellitus with hyperglycemia, with long-term current use of insulin (H)       * insulin syringe-needle U-100 30G X 1/2\" 0.5 ML    BD insulin syringe ULTRAFINE    300 each    Use one syringe three daily or as directed.    Type 2 diabetes mellitus with hyperglycemia, with long-term current use of insulin (H)       * B-D INSULIN SYRINGE 30G X 1/2\" 0.5 ML   Generic drug:  insulin syringe-needle U-100     100 each    USE TO INJECT INSULIN THREE TIMES DAILY OR AS DIRECTED    Type 2 diabetes mellitus with hyperglycemia, with long-term current use of insulin (H)       STATIN NOT PRESCRIBED (INTENTIONAL)     0 each    Statin not prescribed intentionally due to Allergy        tamsulosin 0.4 MG capsule    FLOMAX    90 capsule    Take 1 capsule (0.4 mg) by mouth daily    Benign non-nodular prostatic hyperplasia, presence of lower urinary tract symptoms unspecified       * Notice:  This list has 2 medication(s) that are the same as other medications prescribed for you. Read the directions carefully, and ask your doctor or other care provider to review them with you.      "

## 2018-03-15 NOTE — NURSING NOTE
"Chief Complaint   Patient presents with     Diabetes     Follow up.  Pt is not fasting.      Hyperlipidemia     Follow up.       Initial /75 (BP Location: Left arm, Patient Position: Chair, Cuff Size: Adult Large)  Pulse 86  Temp 97.1  F (36.2  C) (Tympanic)  Ht 5' 8.5\" (1.74 m)  Wt 240 lb (108.9 kg)  SpO2 96%  BMI 35.96 kg/m2 Estimated body mass index is 35.96 kg/(m^2) as calculated from the following:    Height as of this encounter: 5' 8.5\" (1.74 m).    Weight as of this encounter: 240 lb (108.9 kg).  Medication Reconciliation: complete   Emma Montano CMA (AAMA)   (aka: Ruby Montano)      "

## 2018-03-15 NOTE — PATIENT INSTRUCTIONS
1. Schedule fasting lab work   2. Schedule eye exam  3. Schedule colonoscopy   4. Recheck your A1C level in 3 months  5. Follow up with provider in 6 months          Thank you for choosing [facility]3].  You may be receiving a survey in the mail from [place] regarding your visit today.  Please take a few minutes to complete and return the survey to let us know how we are doing.      If you have questions or concerns, please contact us via Shotfarm or you can contact your care team at [phone].    Our Clinic hours are:  Monday 6:40 am  to 7:00 pm  Tuesday -Friday 6:40 am to 5:00 pm    The [place] outpatient lab hours are:  Monday - Friday 6:10 am to 4:45 pm  Saturdays 7:00 am to 11:00 am  Appointments are required, call [phone]    If you have clinical questions after hours or would like to schedule an appointment,  call the clinic at [phone].

## 2018-03-15 NOTE — NURSING NOTE
Screening Questionnaire for Adult Immunization    Are you sick today?   No   Do you have allergies to medications, food, a vaccine component or latex?   No   Have you ever had a serious reaction after receiving a vaccination?   No   Do you have a long-term health problem with heart disease, lung disease, asthma, kidney disease, metabolic disease (e.g. diabetes), anemia, or other blood disorder?   No   Do you have cancer, leukemia, HIV/AIDS, or any other immune system problem?   No   In the past 3 months, have you taken medications that affect  your immune system, such as prednisone, other steroids, or anticancer drugs; drugs for the treatment of rheumatoid arthritis, Crohn s disease, or psoriasis; or have you had radiation treatments?   No   Have you had a seizure, or a brain or other nervous system problem?   No   During the past year, have you received a transfusion of blood or blood     products, or been given immune (gamma) globulin or antiviral drug?   No   For women: Are you pregnant or is there a chance you could become        pregnant during the next month?   No   Have you received any vaccinations in the past 4 weeks?   No     Immunization questionnaire answers were all negative.        Per orders of Dr. Sky, injection of TD given by Ruby Montano. Patient instructed to remain in clinic for 15 minutes afterwards, and to report any adverse reaction to me immediately.       Screening performed by Ruby Montano on 3/15/2018 at 8:38 AM.

## 2018-03-16 ENCOUNTER — HOSPITAL ENCOUNTER (OUTPATIENT)
Facility: CLINIC | Age: 73
End: 2018-03-16
Attending: SURGERY | Admitting: SURGERY
Payer: MEDICARE

## 2018-03-16 DIAGNOSIS — E78.5 HYPERLIPIDEMIA LDL GOAL <70: ICD-10-CM

## 2018-03-16 DIAGNOSIS — E11.65 TYPE 2 DIABETES MELLITUS WITH HYPERGLYCEMIA, WITH LONG-TERM CURRENT USE OF INSULIN (H): ICD-10-CM

## 2018-03-16 DIAGNOSIS — Z79.4 TYPE 2 DIABETES MELLITUS WITH HYPERGLYCEMIA, WITH LONG-TERM CURRENT USE OF INSULIN (H): ICD-10-CM

## 2018-03-16 LAB
ALT SERPL W P-5'-P-CCNC: 76 U/L (ref 0–70)
ANION GAP SERPL CALCULATED.3IONS-SCNC: 6 MMOL/L (ref 3–14)
BUN SERPL-MCNC: 21 MG/DL (ref 7–30)
CALCIUM SERPL-MCNC: 8.5 MG/DL (ref 8.5–10.1)
CHLORIDE SERPL-SCNC: 106 MMOL/L (ref 94–109)
CHOLEST SERPL-MCNC: 103 MG/DL
CO2 SERPL-SCNC: 28 MMOL/L (ref 20–32)
CREAT SERPL-MCNC: 1.05 MG/DL (ref 0.66–1.25)
CREAT UR-MCNC: 82 MG/DL
GFR SERPL CREATININE-BSD FRML MDRD: 69 ML/MIN/1.7M2
GLUCOSE SERPL-MCNC: 150 MG/DL (ref 70–99)
HDLC SERPL-MCNC: 45 MG/DL
LDLC SERPL CALC-MCNC: 44 MG/DL
MICROALBUMIN UR-MCNC: 36 MG/L
MICROALBUMIN/CREAT UR: 43.15 MG/G CR (ref 0–17)
NONHDLC SERPL-MCNC: 58 MG/DL
POTASSIUM SERPL-SCNC: 5 MMOL/L (ref 3.4–5.3)
SODIUM SERPL-SCNC: 140 MMOL/L (ref 133–144)
TRIGL SERPL-MCNC: 68 MG/DL

## 2018-03-16 PROCEDURE — 80061 LIPID PANEL: CPT | Performed by: NURSE PRACTITIONER

## 2018-03-16 PROCEDURE — 80048 BASIC METABOLIC PNL TOTAL CA: CPT | Performed by: NURSE PRACTITIONER

## 2018-03-16 PROCEDURE — 84460 ALANINE AMINO (ALT) (SGPT): CPT | Performed by: NURSE PRACTITIONER

## 2018-03-16 PROCEDURE — 36415 COLL VENOUS BLD VENIPUNCTURE: CPT | Performed by: NURSE PRACTITIONER

## 2018-03-16 PROCEDURE — 82043 UR ALBUMIN QUANTITATIVE: CPT | Performed by: NURSE PRACTITIONER

## 2018-03-24 DIAGNOSIS — E11.65 TYPE 2 DIABETES MELLITUS WITH HYPERGLYCEMIA, WITH LONG-TERM CURRENT USE OF INSULIN (H): ICD-10-CM

## 2018-03-24 DIAGNOSIS — Z79.4 TYPE 2 DIABETES MELLITUS WITH HYPERGLYCEMIA, WITH LONG-TERM CURRENT USE OF INSULIN (H): ICD-10-CM

## 2018-03-25 ENCOUNTER — TELEPHONE (OUTPATIENT)
Dept: FAMILY MEDICINE | Facility: CLINIC | Age: 73
End: 2018-03-25

## 2018-03-25 DIAGNOSIS — Z79.4 TYPE 2 DIABETES MELLITUS WITH HYPERGLYCEMIA, WITH LONG-TERM CURRENT USE OF INSULIN (H): Primary | ICD-10-CM

## 2018-03-25 DIAGNOSIS — E11.65 TYPE 2 DIABETES MELLITUS WITH HYPERGLYCEMIA, WITH LONG-TERM CURRENT USE OF INSULIN (H): Primary | ICD-10-CM

## 2018-03-26 RX ORDER — INSULIN GLARGINE 100 [IU]/ML
20 INJECTION, SOLUTION SUBCUTANEOUS 2 TIMES DAILY
Qty: 45 ML | Refills: 3 | Status: SHIPPED | OUTPATIENT
Start: 2018-03-26 | End: 2018-09-11

## 2018-03-26 NOTE — TELEPHONE ENCOUNTER
"Requested Prescriptions   Pending Prescriptions Disp Refills     B-D INSULIN SYRINGE 30G X 1/2\" 0.5 ML [Pharmacy Med Name: BD ULTRA 0.5CC/30G/12.7MM SYG]  Last Written Prescription Date:  12/30/2017  Last Fill Quantity: 100,  # refills: 0   Last office visit: 3/15/2018 with prescribing provider:  Asya   Future Office Visit:     100 each 0     Sig: USE TO INJECT INSULIN THREE TIMES DAILY OR AS DIRECTED    Diabetic Supplies Protocol Passed    3/24/2018 12:42 PM       Passed - Patient is 18 years of age or older       Passed - Recent (6 mo) or future (30 days) visit within the authorizing provider's specialty    Patient had office visit in the last 6 months or has a visit in the next 30 days with authorizing provider.  See \"Patient Info\" tab in inbasket, or \"Choose Columns\" in Meds & Orders section of the refill encounter.            Alex Garcia RT (R)    "

## 2018-03-26 NOTE — TELEPHONE ENCOUNTER
Patient's insurance does not cover Lantus.  They will cover Basaglar, Levemir and Tresiba.    Please que up Basaglar with current units used for Lantus and I will sign order.

## 2018-03-29 NOTE — TELEPHONE ENCOUNTER
Prescription for insulin syringesapproved per [facility] Refill Protocol.  Recheck A1c in June 2018.    Gabriela STEVE RN

## 2018-04-18 ENCOUNTER — ANESTHESIA EVENT (OUTPATIENT)
Dept: GASTROENTEROLOGY | Facility: CLINIC | Age: 73
End: 2018-04-18

## 2018-04-18 ASSESSMENT — ENCOUNTER SYMPTOMS: DYSRHYTHMIAS: 1

## 2018-04-18 NOTE — ANESTHESIA PREPROCEDURE EVALUATION
Anesthesia Evaluation     . Pt has had prior anesthetic.            ROS/MED HX    ENT/Pulmonary:     (+)sleep apnea, , . .    Neurologic:       Cardiovascular: Comment: Diastolic heart failure    (+) Dyslipidemia, hypertension----. : . . . :. dysrhythmias a-fib, Irregular Heartbeat/Palpitations (SVT), . Previous cardiac testing Echodate:2/23/16results:Interpretation Summary     The rhythm was normal sinus.  The visual ejection fraction is estimated at 55-60%.  Normal left ventricular wall motion  The right ventricle is normal in size and function.  Normal left atrial size.  Right atrial size is normal.  Subcostal views technically difficult and not interpretable. No  hemodynamically significant valvular abnormalities on 2D or color flow  imaging.  ______________________________________________________________________________        Left Ventricle  The left ventricle is normal in size. There is normal left ventricular wall  thickness. The visual ejection fraction is estimated at 55-60%. E by E prime  ratio is between 8 and 15, which is indeterminate for assessment of left  ventricular filling pressures. Normal left ventricular wall motion.     Right Ventricle  The right ventricle is normal in size and function.     Atria  Normal left atrial size. Right atrial size is normal.     Mitral Valve  Redundant elongated chordae are noted. There is systolic anterior motion of  the chordal apparatus. The mitral valve leaflets are mildly thickened. No  outflow tract gradient. There is physiologic mitral regurgitation.     Tricuspid Valve  The tricuspid valve is normal in structure and function. There is physiologic  tricuspid regurgitation. The right ventricular systolic pressure is  approximated at 19.6 mmHg plus the right atrial pressure.     Aortic Valve  There is trivial aortic sclerosis of the non-coronary cusp. The aortic valve  is trileaflet. No aortic regurgitation is present. No aortic stenosis is  present.  Pulmonic  Valve  The pulmonic valve is not well visualized. There is no pulmonic valvular  regurgitation. Normal pulmonic valve velocity.     Vessels  The aortic root is normal size. Inferior vena cava not well visualized for  estimation of right atrial pressure.     Pericardium  There is no pericardial effusion.     Rhythm  The rhythm was normal sinus.date: results: date: results: date: results:          METS/Exercise Tolerance:     Hematologic:     (+) History of blood clots Anemia, History of Transfusion -      Musculoskeletal:         GI/Hepatic:     (+) GERD       Renal/Genitourinary:     (+) chronic renal disease, type: CRI,       Endo: Comment: Hx necrotizing pancreatitis    (+) type II DM Obesity, .      Psychiatric:         Infectious Disease:         Malignancy:         Other:                                                         .

## 2018-04-19 ENCOUNTER — ANESTHESIA (OUTPATIENT)
Dept: GASTROENTEROLOGY | Facility: CLINIC | Age: 73
End: 2018-04-19

## 2018-04-20 DIAGNOSIS — Z79.4 TYPE 2 DIABETES MELLITUS WITH HYPERGLYCEMIA, WITH LONG-TERM CURRENT USE OF INSULIN (H): ICD-10-CM

## 2018-04-20 DIAGNOSIS — E11.65 TYPE 2 DIABETES MELLITUS WITH HYPERGLYCEMIA, WITH LONG-TERM CURRENT USE OF INSULIN (H): ICD-10-CM

## 2018-04-20 RX ORDER — PEN NEEDLE, DIABETIC 31 G X1/4"
NEEDLE, DISPOSABLE MISCELLANEOUS
Qty: 300 EACH | Refills: 1 | Status: SHIPPED | OUTPATIENT
Start: 2018-04-20 | End: 2018-06-28

## 2018-04-20 NOTE — TELEPHONE ENCOUNTER
"Requested Prescriptions   Signed Prescriptions Disp Refills     RELION MINI PEN NEEDLES 31G X 6  each 1     Sig: USE THREE TIMES DAILY OR AS DIRECTED    Diabetic Supplies Protocol Passed    4/20/2018  6:32 AM       Passed - Patient is 18 years of age or older       Passed - Recent (6 mo) or future (30 days) visit within the authorizing provider's specialty    Patient had office visit in the last 6 months or has a visit in the next 30 days with authorizing provider.  See \"Patient Info\" tab in inbasket, or \"Choose Columns\" in Meds & Orders section of the refill encounter.            Prescription approved per [facility] Refill Protocol.    Bhumika NICHOLSON Rn      "

## 2018-07-20 DIAGNOSIS — E11.51 TYPE 2 DIABETES MELLITUS WITH DIABETIC PERIPHERAL ANGIOPATHY WITHOUT GANGRENE (H): ICD-10-CM

## 2018-07-20 NOTE — TELEPHONE ENCOUNTER
"Requested Prescriptions   Pending Prescriptions Disp Refills     blood glucose monitoring (ACCU-CHEK COMPACT PLUS) test strip 400 strip 1     Sig: Use to test blood sugar four times daily or as directed.    Diabetic Supplies Protocol Passed    7/20/2018  1:27 PM       Passed - Patient is 18 years of age or older       Passed - Recent (6 mo) or future (30 days) visit within the authorizing provider's specialty    Patient had office visit in the last 6 months or has a visit in the next 30 days with authorizing provider.  See \"Patient Info\" tab in inbasket, or \"Choose Columns\" in Meds & Orders section of the refill encounter.            Per dictation fron 3/15/18 Office visit:   Follow up in 6 months.    Prescription approved per [facility] Refill Protocol.    Wife, Consent to Communicate in EPIC, informed due for Office visit in September, scheduled for HbgA1C for lab only appointment 7/24/18 at 0735.     "

## 2018-07-20 NOTE — TELEPHONE ENCOUNTER
accucheck compact plus test strips      Last Written Prescription Date:  12/7/17  Last Fill Quantity: 400,   # refills: 1  Last Office Visit: 3/15/18  Future Office visit:       Routing refill request to provider for review/approval because:  Patient needs today please

## 2018-07-24 DIAGNOSIS — Z79.4 TYPE 2 DIABETES MELLITUS WITH HYPERGLYCEMIA, WITH LONG-TERM CURRENT USE OF INSULIN (H): ICD-10-CM

## 2018-07-24 DIAGNOSIS — E11.65 TYPE 2 DIABETES MELLITUS WITH HYPERGLYCEMIA, WITH LONG-TERM CURRENT USE OF INSULIN (H): ICD-10-CM

## 2018-07-24 LAB — HBA1C MFR BLD: 8 % (ref 0–5.6)

## 2018-07-24 PROCEDURE — 83036 HEMOGLOBIN GLYCOSYLATED A1C: CPT | Performed by: NURSE PRACTITIONER

## 2018-07-24 PROCEDURE — 36415 COLL VENOUS BLD VENIPUNCTURE: CPT | Performed by: NURSE PRACTITIONER

## 2018-08-09 ENCOUNTER — TELEPHONE (OUTPATIENT)
Dept: FAMILY MEDICINE | Facility: CLINIC | Age: 73
End: 2018-08-09

## 2018-08-09 NOTE — TELEPHONE ENCOUNTER
Patient called and told of the need for fasting labs and office visit in Sept. Pharmacy notified of this also. Emelia ROBERT RN

## 2018-08-09 NOTE — TELEPHONE ENCOUNTER
Last diabetes and hyperlipidemia clinic note says taking statin with muscles aches and to continue, but no medication and lists statin allergy with muscle aches.    Will address again at next diabetes visit in Sept. Remind patient due for next diabetic check in Sept with fasting labs.    Thanks,  Manuel Ayala MD

## 2018-08-18 ENCOUNTER — TELEPHONE (OUTPATIENT)
Dept: FAMILY MEDICINE | Facility: CLINIC | Age: 73
End: 2018-08-18

## 2018-08-18 DIAGNOSIS — E11.65 TYPE 2 DIABETES MELLITUS WITH HYPERGLYCEMIA, WITH LONG-TERM CURRENT USE OF INSULIN (H): Primary | ICD-10-CM

## 2018-08-18 DIAGNOSIS — Z79.4 TYPE 2 DIABETES MELLITUS WITH HYPERGLYCEMIA, WITH LONG-TERM CURRENT USE OF INSULIN (H): Primary | ICD-10-CM

## 2018-08-20 DIAGNOSIS — N40.0 BENIGN NON-NODULAR PROSTATIC HYPERPLASIA: ICD-10-CM

## 2018-08-20 RX ORDER — INSULIN ASPART 100 [IU]/ML
INJECTION, SOLUTION INTRAVENOUS; SUBCUTANEOUS
Qty: 15 ML | Refills: 0 | Status: SHIPPED | OUTPATIENT
Start: 2018-08-20 | End: 2018-09-11

## 2018-08-20 NOTE — TELEPHONE ENCOUNTER
"Requested Prescriptions   Pending Prescriptions Disp Refills     NOVOLOG FLEXPEN 100 UNIT/ML soln [Pharmacy Med Name: NOVOLOG FLEXPEN 100UNIT/ML INJ]  Last Written Prescription Date:  7/14/2017  Last Fill Quantity: 100ml,  # refills: 3   Last office visit: 3/15/2018 with prescribing provider:  Asya   Future Office Visit:      26     Sig: TAKE 7 UNITS OF NOVOLOG ALONG WITH USING SLIDING SCALE (MAX TOTAL DOSE OF 13 UNITS EACH INJECTION) WITH BREAKFAST, LUNCH AND SUPPER.    Short Acting Insulin Protocol Passed    8/18/2018  9:53 AM       Passed - Blood pressure less than 140/90 in past 6 months    BP Readings from Last 3 Encounters:   03/15/18 137/75   06/12/17 128/80   03/01/17 126/72                Passed - LDL on file in past 12 months    Recent Labs   Lab Test  03/16/18   0706   LDL  44            Passed - Microalbumin on file in past 12 months    Recent Labs   Lab Test  03/16/18   0706   MICROL  36   UMALCR  43.15*            Passed - Serum creatinine on file in past 12 months    Recent Labs   Lab Test  03/16/18   0706   CR  1.05            Passed - HgbA1C in past 3 or 6 months    If HgbA1C is 8 or greater, it needs to be on file within the past 3 months.  If less than 8, must be on file within the past 6 months.     Recent Labs   Lab Test  07/24/18   0722   A1C  8.0*            Passed - Patient is age 18 or older       Passed - Recent (6 mo) or future (30 days) visit within the authorizing provider's specialty    Patient had office visit in the last 6 months or has a visit in the next 30 days with authorizing provider or within the authorizing provider's specialty.  See \"Patient Info\" tab in inbasket, or \"Choose Columns\" in Meds & Orders section of the refill encounter.              "

## 2018-08-20 NOTE — TELEPHONE ENCOUNTER
"Requested Prescriptions   Pending Prescriptions Disp Refills     tamsulosin (FLOMAX) 0.4 MG capsule [Pharmacy Med Name: TAMSULOSIN 0.4MG    CAP]  Last Written Prescription Date:  06/12/17  Last Fill Quantity: 90,  # refills: 3   Last office visit: 3/15/2018 with prescribing provider:  03/15/18   Future Office Visit:     90 capsule 3     Sig: TAKE ONE CAPSULE BY MOUTH ONCE DAILY    Alpha Blockers Passed    8/20/2018  5:30 AM       Passed - Blood pressure under 140/90 in past 12 months    BP Readings from Last 3 Encounters:   03/15/18 137/75   06/12/17 128/80   03/01/17 126/72          Passed - Recent (12 mo) or future (30 days) visit within the authorizing provider's specialty    Patient had office visit in the last 12 months or has a visit in the next 30 days with authorizing provider or within the authorizing provider's specialty.  See \"Patient Info\" tab in inbasket, or \"Choose Columns\" in Meds & Orders section of the refill encounter.           Passed - Patient does not have Tadalafil, Vardenafil, or Sildenafil on their medication list       Passed - Patient is 18 years of age or older          "

## 2018-08-20 NOTE — TELEPHONE ENCOUNTER
Patient has appointment with Dr. Ayala 09/11/18. Would like labs done prior to appointment. I have ordered CBC and BMP per protocol. Any other labs you would like? I pended the TSH because it was on Health Maintenance.      Emily MAHMOOD RN

## 2018-08-21 RX ORDER — TAMSULOSIN HYDROCHLORIDE 0.4 MG/1
CAPSULE ORAL
Qty: 90 CAPSULE | Refills: 1 | Status: SHIPPED | OUTPATIENT
Start: 2018-08-21 | End: 2018-12-11

## 2018-08-23 NOTE — TELEPHONE ENCOUNTER
Labs ordered.    ASSESSMENT/PLAN  Orville was seen today for medication refill.    Diagnoses and all orders for this visit:    Type 2 diabetes mellitus with hyperglycemia, with long-term current use of insulin (H)  -     NOVOLOG FLEXPEN 100 UNIT/ML soln; TAKE 7 UNITS OF NOVOLOG ALONG WITH USING SLIDING SCALE (MAX TOTAL DOSE OF 13 UNITS EACH INJECTION) WITH BREAKFAST, LUNCH AND SUPPER.  -     **CBC with platelets FUTURE anytime; Future  -     **Basic metabolic panel FUTURE anytime; Future  -     **TSH with free T4 reflex FUTURE anytime; Future        Manuel Ayala MD  [facility]

## 2018-09-11 ENCOUNTER — OFFICE VISIT (OUTPATIENT)
Dept: FAMILY MEDICINE | Facility: CLINIC | Age: 73
End: 2018-09-11
Payer: COMMERCIAL

## 2018-09-11 VITALS
TEMPERATURE: 96.8 F | OXYGEN SATURATION: 98 % | BODY MASS INDEX: 37.62 KG/M2 | HEART RATE: 62 BPM | HEIGHT: 68 IN | DIASTOLIC BLOOD PRESSURE: 70 MMHG | SYSTOLIC BLOOD PRESSURE: 122 MMHG | WEIGHT: 248.2 LBS

## 2018-09-11 DIAGNOSIS — Z23 NEED FOR PROPHYLACTIC VACCINATION AND INOCULATION AGAINST INFLUENZA: ICD-10-CM

## 2018-09-11 DIAGNOSIS — R80.9 TYPE 2 DIABETES MELLITUS WITH MICROALBUMINURIA, WITH LONG-TERM CURRENT USE OF INSULIN (H): ICD-10-CM

## 2018-09-11 DIAGNOSIS — I50.32 CHRONIC DIASTOLIC HEART FAILURE (H): Primary | ICD-10-CM

## 2018-09-11 DIAGNOSIS — Z79.4 TYPE 2 DIABETES MELLITUS WITH MICROALBUMINURIA, WITH LONG-TERM CURRENT USE OF INSULIN (H): ICD-10-CM

## 2018-09-11 DIAGNOSIS — Z79.4 TYPE 2 DIABETES MELLITUS WITH HYPERGLYCEMIA, WITH LONG-TERM CURRENT USE OF INSULIN (H): ICD-10-CM

## 2018-09-11 DIAGNOSIS — E11.29 TYPE 2 DIABETES MELLITUS WITH MICROALBUMINURIA, WITH LONG-TERM CURRENT USE OF INSULIN (H): ICD-10-CM

## 2018-09-11 DIAGNOSIS — E11.65 TYPE 2 DIABETES MELLITUS WITH HYPERGLYCEMIA, WITH LONG-TERM CURRENT USE OF INSULIN (H): ICD-10-CM

## 2018-09-11 DIAGNOSIS — Z12.11 SPECIAL SCREENING FOR MALIGNANT NEOPLASMS, COLON: ICD-10-CM

## 2018-09-11 LAB
ANION GAP SERPL CALCULATED.3IONS-SCNC: 4 MMOL/L (ref 3–14)
BUN SERPL-MCNC: 21 MG/DL (ref 7–30)
CALCIUM SERPL-MCNC: 8.4 MG/DL (ref 8.5–10.1)
CHLORIDE SERPL-SCNC: 103 MMOL/L (ref 94–109)
CO2 SERPL-SCNC: 30 MMOL/L (ref 20–32)
CREAT SERPL-MCNC: 1.02 MG/DL (ref 0.66–1.25)
ERYTHROCYTE [DISTWIDTH] IN BLOOD BY AUTOMATED COUNT: 12.6 % (ref 10–15)
GFR SERPL CREATININE-BSD FRML MDRD: 71 ML/MIN/1.7M2
GLUCOSE SERPL-MCNC: 178 MG/DL (ref 70–99)
HCT VFR BLD AUTO: 41.6 % (ref 40–53)
HGB BLD-MCNC: 14.9 G/DL (ref 13.3–17.7)
MCH RBC QN AUTO: 32.9 PG (ref 26.5–33)
MCHC RBC AUTO-ENTMCNC: 35.8 G/DL (ref 31.5–36.5)
MCV RBC AUTO: 92 FL (ref 78–100)
PLATELET # BLD AUTO: 139 10E9/L (ref 150–450)
POTASSIUM SERPL-SCNC: 5 MMOL/L (ref 3.4–5.3)
RBC # BLD AUTO: 4.53 10E12/L (ref 4.4–5.9)
SODIUM SERPL-SCNC: 137 MMOL/L (ref 133–144)
TSH SERPL DL<=0.005 MIU/L-ACNC: 1.53 MU/L (ref 0.4–4)
WBC # BLD AUTO: 4.6 10E9/L (ref 4–11)

## 2018-09-11 PROCEDURE — 84443 ASSAY THYROID STIM HORMONE: CPT | Performed by: FAMILY MEDICINE

## 2018-09-11 PROCEDURE — 90662 IIV NO PRSV INCREASED AG IM: CPT | Performed by: FAMILY MEDICINE

## 2018-09-11 PROCEDURE — 85027 COMPLETE CBC AUTOMATED: CPT | Performed by: FAMILY MEDICINE

## 2018-09-11 PROCEDURE — 36415 COLL VENOUS BLD VENIPUNCTURE: CPT | Performed by: FAMILY MEDICINE

## 2018-09-11 PROCEDURE — 80048 BASIC METABOLIC PNL TOTAL CA: CPT | Performed by: FAMILY MEDICINE

## 2018-09-11 PROCEDURE — 99214 OFFICE O/P EST MOD 30 MIN: CPT | Performed by: FAMILY MEDICINE

## 2018-09-11 PROCEDURE — G0008 ADMIN INFLUENZA VIRUS VAC: HCPCS | Performed by: FAMILY MEDICINE

## 2018-09-11 RX ORDER — INSULIN GLARGINE 100 [IU]/ML
20 INJECTION, SOLUTION SUBCUTANEOUS 2 TIMES DAILY
Qty: 36 ML | Refills: 3 | Status: SHIPPED | OUTPATIENT
Start: 2018-09-11 | End: 2018-12-11

## 2018-09-11 NOTE — MR AVS SNAPSHOT
After Visit Summary   9/11/2018    Orville Plasencia    MRN: [id number]           Patient Information     Date Of Birth          1945        Visit Information        Provider Department      9/11/2018 7:40 AM Manuel Ayala MD [facility]        Today's Diagnoses     Chronic diastolic heart failure (H)    -  1    Type 2 diabetes mellitus with hyperglycemia, with long-term current use of insulin (H)        Type 2 diabetes mellitus with microalbuminuria, with long-term current use of insulin (H)        Special screening for malignant neoplasms, colon          Care Instructions    1. To lab.    It would be best to get the numbers in the afternoon/evening a little better with decreasing carbs and increasing activity.  If numbers are getting too high then consider increasing the Novolog sliding scale just a little more for the lunch and dinner numbers.    Schedule the colonoscopy              Follow-ups after your visit        Additional Services     GASTROENTEROLOGY ADULT REF PROCEDURE ONLY [facility] [phone]       Last Lab Result: Creatinine (mg/dL)       Date                     Value                 03/16/2018               1.05             ----------  Body mass index is 37.74 kg/(m^2).     Needed:  No  Language:  English    Patient will be contacted to schedule procedure.     Please be aware that coverage of these services is subject to the terms and limitations of your health insurance plan.  Call member services at your health plan with any benefit or coverage questions.  Any procedures must be performed at a [facility] facility OR coordinated by your clinic's referral office.    Please bring the following with you to your appointment:    (1) Any X-Rays, CTs or MRIs which have been performed.  Contact the facility where they were done to arrange for  prior to your scheduled appointment.    (2) List of current medications   (3) This referral request  "  (4) Any documents/labs given to you for this referral                  Who to contact     If you have questions or need follow up information about today's clinic visit or your schedule please contact [facility] directly at [phone].  Normal or non-critical lab and imaging results will be communicated to you by MyChart, letter or phone within 4 business days after the clinic has received the results. If you do not hear from us within 7 days, please contact the clinic through Doculynxhart or phone. If you have a critical or abnormal lab result, we will notify you by phone as soon as possible.  Submit refill requests through bVisual or call your pharmacy and they will forward the refill request to us. Please allow 3 business days for your refill to be completed.          Additional Information About Your Visit        [employer] Information     bVisual gives you secure access to your electronic health record. If you see a primary care provider, you can also send messages to your care team and make appointments. If you have questions, please call your primary care clinic.  If you do not have a primary care provider, please call [phone] and they will assist you.        Care EveryWhere ID     This is your Care EveryWhere ID. This could be used by other organizations to access your [facility] medical records  [id number]        Your Vitals Were     Pulse Temperature Height Pulse Oximetry BMI (Body Mass Index)       62 96.8  F (36  C) (Tympanic) 5' 8\" (1.727 m) 98% 37.74 kg/m2        Blood Pressure from Last 3 Encounters:   09/11/18 122/70   03/15/18 137/75   06/12/17 128/80    Weight from Last 3 Encounters:   09/11/18 248 lb 3.2 oz (112.6 kg)   03/15/18 240 lb (108.9 kg)   06/12/17 243 lb (110.2 kg)              We Performed the Following     Basic metabolic panel     CBC with platelets     GASTROENTEROLOGY ADULT REF PROCEDURE ONLY [facility] [phone]     TSH with free T4 reflex          Where to " get your medicines      These medications were sent to [id number]  [place], [place] - [address]  [address], [place] [address]     Phone:  [phone]     BASAGLAR 100 UNIT/ML injection    insulin aspart 100 UNIT/ML injection          Primary Care Provider Office Phone # Fax #    JAHAIRA Perdue [facility] [phone] [address] [facility] [address]        Equal Access to Services     FABIÁN KIMBLE : Hadii aad ku hadasho Soomaali, waaxda luqadaha, qaybta kaalmada adeegyada, waxay idiin hayaan adeeg kharash ladavid . So [facility] [phone].    ATENCIÓN: Si habla español, tiene a hampton disposición servicios gratuitos de asistencia lingüística. [facility] [phone].    We comply with applicable federal civil rights laws and [place] laws. We do not discriminate on the basis of race, color, national origin, age, disability, sex, sexual orientation, or gender identity.            Thank you!     Thank you for choosing [facility]  for your care. Our goal is always to provide you with excellent care. Hearing back from our patients is one way we can continue to improve our services. Please take a few minutes to complete the written survey that you may receive in the mail after your visit with us. Thank you!             Your Updated Medication List - Protect others around you: Learn how to safely use, store and throw away your medicines at www.disposemymeds.org.          This list is accurate as of 9/11/18  8:32 AM.  Always use your most recent med list.                   Brand Name Dispense Instructions for use Diagnosis    ASPIRIN NOT PRESCRIBED    INTENTIONAL    0 each    Please choose reason not prescribed, below    Type 2 diabetes mellitus with hyperglycemia, with long-term current use of insulin (H)       BASAGLAR 100 UNIT/ML injection     36 mL    Inject 20 Units Subcutaneous 2 times daily    Type 2 diabetes mellitus with hyperglycemia, with long-term current  use of insulin (H)       blood glucose lancing device     1 each    Device to be used with lancets.    Type 2 diabetes mellitus with hyperglycemia, with long-term current use of insulin (H)       blood glucose monitoring lancets     3 Box    Use to test blood sugar 3 times daily    Type 2 diabetes mellitus with hyperglycemia, with long-term current use of insulin (H)       blood glucose monitoring meter device kit    no brand specified    1 kit    Use to test blood sugar 3 times daily or as directed. Patient needs glucometer only to replace broken one. He will bring the monitor that he has for comparison to the pharmacy    Type 2 diabetes mellitus with hyperglycemia, with long-term current use of insulin (H)       blood glucose monitoring test strip    ACCU-CHEK COMPACT PLUS    400 strip    USE ONE STRIP TO CHECK GLUCOSE 4 TIMES DAILY    Type 2 diabetes mellitus with diabetic peripheral angiopathy without gangrene (H)       furosemide 20 MG tablet    LASIX    90 tablet    Take 1 tablet (20 mg) by mouth daily as needed    Hypertension goal BP (blood pressure) < 140/90       insulin aspart 100 UNIT/ML injection    NovoLOG FLEXPEN    30 mL    TAKE 7 UNITS OF NOVOLOG ALONG WITH USING SLIDING SCALE (MAX TOTAL DOSE OF 13 UNITS EACH INJECTION) WITH BREAKFAST, LUNCH AND SUPPER.    Type 2 diabetes mellitus with hyperglycemia, with long-term current use of insulin (H)       insulin pen needle 31G X 6 MM    RELION MINI PEN NEEDLES    100 each    Use 5 pen needles daily or as directed.    Type 2 diabetes mellitus with hyperglycemia, with long-term current use of insulin (H)       STATIN NOT PRESCRIBED (INTENTIONAL)     0 each    Statin not prescribed intentionally due to Allergy        tamsulosin 0.4 MG capsule    FLOMAX    90 capsule    TAKE ONE CAPSULE BY MOUTH ONCE DAILY    Benign non-nodular prostatic hyperplasia

## 2018-09-11 NOTE — PROGRESS NOTES

## 2018-09-11 NOTE — PATIENT INSTRUCTIONS
1. To lab.    It would be best to get the numbers in the afternoon/evening a little better with decreasing carbs and increasing activity.  If numbers are getting too high then consider increasing the Novolog sliding scale just a little more for the lunch and dinner numbers.    Schedule the colonoscopy

## 2018-09-11 NOTE — PROGRESS NOTES
SUBJECTIVE:   Orville Plasencia is a 73 year old male who presents to clinic today for the following health issues:      Diabetes Follow-up    Patient is checking blood sugars: twice daily.    Blood sugar testing frequency justification: On insulin, frequency appropriate   Results are as follows:         am - 120         lunchtime - 220/240         suppertime - 250    Diabetic concerns: blood sugar frequently over 200     Symptoms of hypoglycemia (low blood sugar): none     Paresthesias (numbness or burning in feet) or sores: No     Date of last diabetic eye exam: n/a    BP Readings from Last 2 Encounters:   09/11/18 122/70   03/15/18 137/75     Hemoglobin A1C (%)   Date Value   07/24/2018 8.0 (H)   03/13/2018 8.0 (H)     LDL Cholesterol Calculated (mg/dL)   Date Value   03/16/2018 44   02/21/2017 62       Diabetes Management Resources    Amount of exercise or physical activity: 6-7 days/week for an average of greater than 60 minutes    Problems taking medications regularly: No    Medication side effects: none    Diet: regular (no restrictions)    Insulin Lantus 20 U BID  Novolog breakfast 7-8 U, 10 U usual for lunch, 12-13 U at dinner.    Lower Extremity Edema due to diastolic dysfunction: taking lasix 20mg about once a week.    Problem list and histories reviewed & adjusted, as indicated.  Additional history: as documented    Recent Labs   Lab Test  07/24/18   0722  03/16/18   0706  03/13/18   0722  06/12/17   0833  06/09/17   0649  02/21/17   0816   09/07/16   0759   02/18/16   0901  02/18/16   0857   A1C  8.0*   --   8.0*   --   7.7*   --    < >   --    < >  11.7*   --    LDL   --   44   --    --    --   62   --    --    --   50   --    HDL   --   45   --    --    --   40   --    --    --   35*   --    TRIG   --   68   --    --    --   88   --    --    --   109   --    ALT   --   76*   --    --    --   64   --   39   --    --    --    CR   --   1.05   --   1.06   --   1.18   --   1.14   --   1.15   --   "  GFRESTIMATED   --   69   --   68   --   61   --   63   --   63   --    GFRESTBLACK   --   84   --   83   --   73   --   76   --   76   --    POTASSIUM   --   5.0   --   5.3   --   5.2   --   4.6   --   4.9   --    TSH   --    --    --    --    --    --    --   1.16   --    --   1.02    < > = values in this interval not displayed.        Reviewed and updated as needed this visit by clinical staff  Tobacco  Allergies  Med Hx  Surg Hx  Fam Hx  Soc Hx      Reviewed and updated as needed this visit by Provider         ROS:  CONSTITUTIONAL: NEGATIVE for fever, chills, change in weight  ENT/MOUTH: NEGATIVE for ear, mouth and throat problems  RESP: NEGATIVE for significant cough or SOB  CV: NEGATIVE for chest pain, palpitations or peripheral edema    OBJECTIVE:     /70  Pulse 62  Temp 96.8  F (36  C) (Tympanic)  Ht 5' 8\" (1.727 m)  Wt 248 lb 3.2 oz (112.6 kg)  SpO2 98%  BMI 37.74 kg/m2  Body mass index is 37.74 kg/(m^2).  GENERAL: healthy, alert and no distress  NECK: no adenopathy, no asymmetry, masses, or scars and thyroid normal to palpation  CV: regular rate and rhythm, normal S1 S2, no S3 or S4, no murmur, click or rub, 1+ pitting lower extremity edema to knee up shin.    Diagnostic Test Results:  none     ASSESSMENT/PLAN:       Orivlle was seen today for diabetes, flu shot and health maintenance.    Diagnoses and all orders for this visit:    Chronic diastolic heart failure (H): stable with if needed lasix    Type 2 diabetes mellitus with hyperglycemia, with long-term current use of insulin (H): continue current dose but discussed if lunch and dinner still high that Novolog should go up to 8 with lunch and dinner plus sliding scale.  -     BASAGLAR 100 UNIT/ML injection; Inject 20 Units Subcutaneous 2 times daily  -     insulin aspart (NOVOLOG FLEXPEN) 100 UNIT/ML injection; TAKE 7 UNITS OF NOVOLOG ALONG WITH USING SLIDING SCALE (MAX TOTAL DOSE OF 13 UNITS EACH INJECTION) WITH BREAKFAST, LUNCH AND " SUPPER.  -     TSH with free T4 reflex    Type 2 diabetes mellitus with microalbuminuria, with long-term current use of insulin (H): interested in Continousous glucose meter  -     Basic metabolic panel  -     CBC with platelets    Special screening for malignant neoplasms, colon  -     GASTROENTEROLOGY ADULT REF PROCEDURE ONLY [facility] [phone]        Patient Instructions   1. To lab.    It would be best to get the numbers in the afternoon/evening a little better with decreasing carbs and increasing activity.  If numbers are getting too high then consider increasing the Novolog sliding scale just a little more for the lunch and dinner numbers.          Manuel Ayala MD  [facility]

## 2018-10-29 DIAGNOSIS — E11.65 TYPE 2 DIABETES MELLITUS WITH HYPERGLYCEMIA, WITH LONG-TERM CURRENT USE OF INSULIN (H): ICD-10-CM

## 2018-10-29 DIAGNOSIS — Z79.4 TYPE 2 DIABETES MELLITUS WITH HYPERGLYCEMIA, WITH LONG-TERM CURRENT USE OF INSULIN (H): ICD-10-CM

## 2018-10-29 RX ORDER — PEN NEEDLE, DIABETIC 32GX 5/32"
NEEDLE, DISPOSABLE MISCELLANEOUS
Qty: 500 EACH | Refills: 1 | Status: SHIPPED | OUTPATIENT
Start: 2018-10-29 | End: 2019-04-26

## 2018-10-29 NOTE — TELEPHONE ENCOUNTER
"Requested Prescriptions   Pending Prescriptions Disp Refills     RELION PEN NEEDLES 31G X 6 MM [Pharmacy Med Name: RELION PEN NEEDLES [id number]]  Last Written Prescription Date:  06/28/18  Last Fill Quantity: 100,  # refills: 5   Last office visit: 9/11/2018 with prescribing provider:  09/11/18   Future Office Visit:     300 each 1     Sig: USE FOR ADMINISTERING INSULIN 5 TIMES DAILY OR AS DIRECTED    Diabetic Supplies Protocol Passed    10/29/2018  7:40 AM       Passed - Patient is 18 years of age or older       Passed - Recent (6 mo) or future (30 days) visit within the authorizing provider's specialty    Patient had office visit in the last 6 months or has a visit in the next 30 days with authorizing provider.  See \"Patient Info\" tab in inbasket, or \"Choose Columns\" in Meds & Orders section of the refill encounter.              "

## 2018-11-02 ENCOUNTER — ANESTHESIA EVENT (OUTPATIENT)
Dept: GASTROENTEROLOGY | Facility: CLINIC | Age: 73
End: 2018-11-02
Payer: MEDICARE

## 2018-11-02 ASSESSMENT — ENCOUNTER SYMPTOMS: DYSRHYTHMIAS: 1

## 2018-11-03 NOTE — ANESTHESIA PREPROCEDURE EVALUATION
Anesthesia Evaluation     . Pt has had prior anesthetic. Type: General and MAC    No history of anesthetic complications          ROS/MED HX    ENT/Pulmonary:     (+)sleep apnea, , . .    Neurologic: Comment: Sensorineural hearing loss      Cardiovascular: Comment: Diastolic heart failure   Pseudoaneurysm   SVT   Thrombosis of leg    (+) Dyslipidemia, hypertension----. : . . . :. dysrhythmias a-fib, . Previous cardiac testing Echodate:8-20-16odigkdz:   Interpretation Summary     The rhythm was normal sinus.  The visual ejection fraction is estimated at 55-60%.  Normal left ventricular wall motion  The right ventricle is normal in size and function.  Normal left atrial size.  Right atrial size is normal.  Subcostal views technically difficult and not interpretable. No  hemodynamically significant valvular abnormalities on 2D or color flow  imaging.Stress Testdate:7-3-13 results:nlECG reviewed date:1-19-15 results:SB (59) date: results:          METS/Exercise Tolerance:     Hematologic:     (+) History of blood clots Anemia, -      Musculoskeletal: Comment: Generalized weakness  Difficulty in walking         GI/Hepatic: Comment: Necrotizing pancreatitis    (+) GERD       Renal/Genitourinary: Comment: Chronic kidney disease    (+) chronic renal disease,       Endo:     (+) type II DM Obesity, .      Psychiatric:         Infectious Disease:         Malignancy:         Other: Comment: Herpes zoster without complication  Anasarca  Chronic infection                    Physical Exam  Normal systems: pulmonary and dental    Airway   Mallampati: II  TM distance: >3 FB  Neck ROM: full    Dental     Cardiovascular   Rhythm and rate: irregular and normal      Pulmonary    breath sounds clear to auscultation                    Anesthesia Plan      History & Physical Review  History and physical reviewed and following examination; no interval change.    ASA Status:  3 .    NPO Status:  > 6 hours    Plan for MAC and General  Reason for MAC:  Chronic cardiopulmonary disease (G9) and Deep or markedly invasive procedure (G8)         Postoperative Care      Consents  Anesthetic plan, risks, benefits and alternatives discussed with:  Patient..                          .

## 2018-11-08 ENCOUNTER — APPOINTMENT (OUTPATIENT)
Dept: GENERAL RADIOLOGY | Facility: CLINIC | Age: 73
End: 2018-11-08
Attending: SURGERY
Payer: MEDICARE

## 2018-11-08 ENCOUNTER — ANESTHESIA (OUTPATIENT)
Dept: GASTROENTEROLOGY | Facility: CLINIC | Age: 73
End: 2018-11-08
Payer: MEDICARE

## 2018-11-08 ENCOUNTER — HOSPITAL ENCOUNTER (OUTPATIENT)
Facility: CLINIC | Age: 73
Discharge: HOME | End: 2018-11-08
Attending: SURGERY | Admitting: SURGERY
Payer: MEDICARE

## 2018-11-08 ENCOUNTER — SURGERY (OUTPATIENT)
Age: 73
End: 2018-11-08

## 2018-11-08 VITALS
HEART RATE: 69 BPM | TEMPERATURE: 97.9 F | HEIGHT: 69 IN | RESPIRATION RATE: 16 BRPM | BODY MASS INDEX: 35.55 KG/M2 | WEIGHT: 240 LBS | SYSTOLIC BLOOD PRESSURE: 126 MMHG | DIASTOLIC BLOOD PRESSURE: 70 MMHG | OXYGEN SATURATION: 99 %

## 2018-11-08 LAB
COLONOSCOPY: NORMAL
GLUCOSE BLDC GLUCOMTR-MCNC: 89 MG/DL (ref 70–99)

## 2018-11-08 PROCEDURE — 45378 DIAGNOSTIC COLONOSCOPY: CPT | Performed by: SURGERY

## 2018-11-08 PROCEDURE — 25000128 H RX IP 250 OP 636: Performed by: NURSE ANESTHETIST, CERTIFIED REGISTERED

## 2018-11-08 PROCEDURE — 25000125 ZZHC RX 250: Performed by: NURSE ANESTHETIST, CERTIFIED REGISTERED

## 2018-11-08 PROCEDURE — 37000008 ZZH ANESTHESIA TECHNICAL FEE, 1ST 30 MIN: Performed by: SURGERY

## 2018-11-08 PROCEDURE — 25000128 H RX IP 250 OP 636: Performed by: SURGERY

## 2018-11-08 PROCEDURE — G0121 COLON CA SCRN NOT HI RSK IND: HCPCS | Mod: 53 | Performed by: SURGERY

## 2018-11-08 PROCEDURE — 82962 GLUCOSE BLOOD TEST: CPT

## 2018-11-08 PROCEDURE — 74270 X-RAY XM COLON 1CNTRST STD: CPT

## 2018-11-08 PROCEDURE — G0121 COLON CA SCRN NOT HI RSK IND: HCPCS | Mod: 74 | Performed by: SURGERY

## 2018-11-08 RX ORDER — LIDOCAINE 40 MG/G
CREAM TOPICAL
Status: DISCONTINUED | OUTPATIENT
Start: 2018-11-08 | End: 2018-11-08 | Stop reason: HOSPADM

## 2018-11-08 RX ORDER — ONDANSETRON 2 MG/ML
4 INJECTION INTRAMUSCULAR; INTRAVENOUS
Status: DISCONTINUED | OUTPATIENT
Start: 2018-11-08 | End: 2018-11-08 | Stop reason: HOSPADM

## 2018-11-08 RX ORDER — SODIUM CHLORIDE, SODIUM LACTATE, POTASSIUM CHLORIDE, CALCIUM CHLORIDE 600; 310; 30; 20 MG/100ML; MG/100ML; MG/100ML; MG/100ML
INJECTION, SOLUTION INTRAVENOUS CONTINUOUS
Status: DISCONTINUED | OUTPATIENT
Start: 2018-11-08 | End: 2018-11-08 | Stop reason: HOSPADM

## 2018-11-08 RX ORDER — PROPOFOL 10 MG/ML
INJECTION, EMULSION INTRAVENOUS CONTINUOUS PRN
Status: DISCONTINUED | OUTPATIENT
Start: 2018-11-08 | End: 2018-11-08

## 2018-11-08 RX ORDER — PROPOFOL 10 MG/ML
INJECTION, EMULSION INTRAVENOUS PRN
Status: DISCONTINUED | OUTPATIENT
Start: 2018-11-08 | End: 2018-11-08

## 2018-11-08 RX ADMIN — SODIUM CHLORIDE, POTASSIUM CHLORIDE, SODIUM LACTATE AND CALCIUM CHLORIDE: 600; 310; 30; 20 INJECTION, SOLUTION INTRAVENOUS at 11:52

## 2018-11-08 RX ADMIN — SODIUM CHLORIDE, POTASSIUM CHLORIDE, SODIUM LACTATE AND CALCIUM CHLORIDE: 600; 310; 30; 20 INJECTION, SOLUTION INTRAVENOUS at 12:35

## 2018-11-08 RX ADMIN — LIDOCAINE HYDROCHLORIDE 1 ML: 10 INJECTION, SOLUTION EPIDURAL; INFILTRATION; INTRACAUDAL; PERINEURAL at 11:52

## 2018-11-08 RX ADMIN — PROPOFOL 150 MCG/KG/MIN: 10 INJECTION, EMULSION INTRAVENOUS at 12:37

## 2018-11-08 RX ADMIN — PROPOFOL 50 MG: 10 INJECTION, EMULSION INTRAVENOUS at 12:37

## 2018-11-08 NOTE — ANESTHESIA POSTPROCEDURE EVALUATION
Patient: Orville Plasencia    Procedure(s):  colonoscopy    Diagnosis:screening  Diagnosis Additional Information: No value filed.    Anesthesia Type:  MAC, General    Note:  Anesthesia Post Evaluation    Patient location during evaluation: Bedside  Patient participation: Able to fully participate in evaluation  Level of consciousness: awake and alert  Pain management: adequate  Airway patency: patent  Cardiovascular status: acceptable  Respiratory status: acceptable  Hydration status: acceptable  PONV: none     Anesthetic complications: None          Last vitals:  Vitals:    11/08/18 1132   BP: 146/89   Pulse: 69   Resp: 18   Temp: 36.6  C (97.9  F)   SpO2: 96%         Electronically Signed By: Shant Kincaid CRNA, JAHAIRA MONTANO  November 8, 2018  12:55 PM

## 2018-11-08 NOTE — OP NOTE
Colonoscopy - unable to get past splenic flexure due to restricted mobility of colon.  BE ordered.

## 2018-11-08 NOTE — PROGRESS NOTES
Pt was brought to Xray per wheelchair with wife for Barium Enema.  Pt was discharged from [facility].

## 2018-11-08 NOTE — H&P
73 year old year old male here for colonoscopy for screening.    Patient Active Problem List   Diagnosis     Sensorineural hearing loss     RADHA (obstructive sleep apnea) with REM related hypoventilation-Moderate (AHI 26)     Advanced directives, counseling/discussion     GERD (gastroesophageal reflux disease)     Generalized weakness     Anasarca     SVT (supraventricular tachycardia) (H)     Necrotizing pancreatitis     Atrial fibrillation (H)     Anemia due to blood loss, acute     Health Care Home     Pseudoaneurysm (H)     Diastolic heart failure (H)     Chronic pancreatitis (H)     Pancreatitis, gallstone     Type 2 diabetes mellitus with hyperglycemia (H)     Morbid obesity due to excess calories (H)     Herpes zoster without complication     Hyperlipidemia LDL goal <70     Type 2 diabetes mellitus with microalbuminuria, with long-term current use of insulin (H)       Past Medical History:   Diagnosis Date     Anemia      Arrhythmia      Atrial fibrillation (H) 9/17/2013     Chronic infection     ESBL     Chronic kidney disease      Diabetes mellitus     type 2     Difficulty in walking(719.7)      Gastro-oesophageal reflux disease      History of blood transfusion      Hypertension      Obese      Pancreatic disease      Pancreatitis      Sleep apnea     Bipap at night     Thrombosis of leg        Past Surgical History:   Procedure Laterality Date     ENDOSCOPIC INSERTION TUBE GASTROSTOMY  9/20/2013    Procedure: ENDOSCOPIC INSERTION TUBE GASTROSTOMY;  Exchange of Gastrojejunostomy Tube , dilation of cyst gastrostomy, endoscopic pancreatocolengiogram with dilation of cyst gastrostomy and stent removal, exchange of cyst duodenostomy stent;  Surgeon: Negro Munguia MD;  Location: [facility] OR     ENDOSCOPIC INSERTION TUBE JEJUNOSTOMY  8/19/2013    Procedure: ENDOSCOPIC INSERTION TUBE JEJUNOSTOMY;;  Surgeon: Drew Palacios MD;  Location: [facility]     ENDOSCOPIC RETROGRADE CHOLANGIOPANCREATOGRAM  8/19/2013     Procedure: ENDOSCOPIC RETROGRADE CHOLANGIOPANCREATOGRAM;;  Surgeon: Drew Palacios MD;  Location: [facility]     ENDOSCOPIC RETROGRADE CHOLANGIOPANCREATOGRAM  9/12/2013    Procedure: ENDOSCOPIC RETROGRADE CHOLANGIOPANCREATOGRAM;  Endoscopic Retrograde Cholangiopancreatogram with necrosectomy, dilation of wall of necrosis, stent removal x 4 and gastro-jejunal tube placement.;  Surgeon: Negro Munguia MD;  Location: [facility]     ENDOSCOPIC RETROGRADE CHOLANGIOPANCREATOGRAM  10/7/2013    Procedure: ENDOSCOPIC RETROGRADE CHOLANGIOPANCREATOGRAM;  endoscopic retrograde cholangiopancreatogram, cyst gastrostomy stent exchange, necrosectomy.;  Surgeon: Drew Palacios MD;  Location: [facility]     ENDOSCOPIC RETROGRADE CHOLANGIOPANCREATOGRAM  11/18/2013    Procedure: ENDOSCOPIC RETROGRADE CHOLANGIOPANCREATOGRAM;  endoscopic retrograde cholangiopancreatogram with necrosectomy;  Surgeon: Drew Palacios MD;  Location: [facility]     ENDOSCOPIC RETROGRADE CHOLANGIOPANCREATOGRAM  3/12/2014    Procedure: ENDOSCOPIC RETROGRADE CHOLANGIOPANCREATOGRAM;  Endoscopic Retrograde Cholangiopancreatogram with Stent Placement in Cyst Gastrostomy;  Surgeon: Winter Bowden MD;  Location: [facility]     ENDOSCOPIC RETROGRADE CHOLANGIOPANCREATOGRAPHY, LITHOTRIPSY PANCREAS, COMBINED  9/19/2013    Procedure: COMBINED ENDOSCOPIC RETROGRADE CHOLANGIOPANCREATOGRAPHY, LITHOTRIPSY PANCREAS;  Endoscopic Retrograde Cholangiopancreatogram, placement of 2 stents in cyst gastrostomy;  Surgeon: Drew Palacios MD;  Location: [facility]     ENDOSCOPIC ULTRASOUND UPPER GASTROINTESTINAL TRACT (GI)  7/22/2013    Procedure: ENDOSCOPIC ULTRASOUND UPPER GASTROINTESTINAL TRACT (GI);  Endoscopic Ultrasound;  Surgeon: Drew Palacios MD;  [facility]     ENDOSCOPIC ULTRASOUND UPPER GASTROINTESTINAL TRACT (GI)  8/19/2013    Procedure: ENDOSCOPIC ULTRASOUND UPPER GASTROINTESTINAL TRACT (GI);  Endoscopic ultrasound with fine needle aspiration,  Endorscopic retrograde  "cholangiopancreatogram with cystgastrostomy,       ENDOSCOPIC ULTRASOUND UPPER GASTROINTESTINAL TRACT (GI)  9/19/2013    Procedure: ENDOSCOPIC ULTRASOUND UPPER GASTROINTESTINAL TRACT (GI);  Endoscopic Ultrasound of Upper Gastrointestinal Tract;  Surgeon: Drew Palacios MD;  [facility]     ENDOSCOPIC ULTRASOUND UPPER GASTROINTESTINAL TRACT (GI)  8/7/2014    Procedure: ENDOSCOPIC ULTRASOUND, ESOPHAGOSCOPY / UPPER GASTROINTESTINAL TRACT (GI);  Surgeon: Adrian Plaza MD;  Location: [facility]     ESOPHAGOSCOPY, GASTROSCOPY, DUODENOSCOPY (EGD), COMBINED  12/17/2013    Procedure: COMBINED ESOPHAGOSCOPY, GASTROSCOPY, DUODENOSCOPY (EGD);  EGD with stent removal and GJ tube replacement;  Surgeon: Negro Munguia MD;  Location: [facility]     ESOPHAGOSCOPY, GASTROSCOPY, DUODENOSCOPY (EGD), COMBINED  12/14/2013    Procedure: COMBINED ESOPHAGOSCOPY, GASTROSCOPY, DUODENOSCOPY (EGD);;  Surgeon: Winter Bowden MD;  Location: [facility]     ESOPHAGOSCOPY, GASTROSCOPY, DUODENOSCOPY (EGD), COMBINED  7/29/2014    Procedure: COMBINED ESOPHAGOSCOPY, GASTROSCOPY, DUODENOSCOPY (EGD);  Surgeon: Negro Munguia MD;  Location: [facility]     GASTROSTOMY TUBE       HC COLONOSCOPY THRU STOMA, DIAGNOSTIC  2007    Normal     LAPAROSCOPIC CHOLECYSTECTOMY  7/29/2014    Procedure: LAPAROSCOPIC CHOLECYSTECTOMY;  Surgeon: Olga Bruner MD;  Location: [facility]     PICC INSERTION  12/20/2013    5fr DL Power PICC, 41cm (1cm external) in the R basilic vein w/ tip in the  mid SVC.       @[facility]@    No current outpatient prescriptions on file.       Allergies   Allergen Reactions     Lipitor [Atorvastatin Calcium] Cramps     Leg cramps       Pt reports that he has never smoked. He has never used smokeless tobacco. He reports that he drinks alcohol. He reports that he does not use illicit drugs.    Exam:  /89 (BP Location: Right arm)  Pulse 69  Temp 97.9  F (36.6  C) (Oral)  Resp 18  Ht 1.753 m (5' 9\")  Wt 108.9 kg (240 lb)  SpO2 " 96%  BMI 35.44 kg/m2    Awake, Alert OX3  Lungs - CTA bilaterally  CV - RRR, no murmurs, distal pulses intact  Abd - soft, non-distended, non-tender, +BS  Extr - No cyanosis or edema    A/P 73 year old year old male in need of colonoscopy for screening. Risks, benefits, alternatives, and complications were discussed including the possibility of perforation and the patient agreed to proceed    Adrian Chowdary MD

## 2018-11-08 NOTE — H&P
73 year old year old male here for colonoscopy for screening.    Patient Active Problem List   Diagnosis     Sensorineural hearing loss     RADHA (obstructive sleep apnea) with REM related hypoventilation-Moderate (AHI 26)     Advanced directives, counseling/discussion     GERD (gastroesophageal reflux disease)     Generalized weakness     Anasarca     SVT (supraventricular tachycardia) (H)     Necrotizing pancreatitis     Atrial fibrillation (H)     Anemia due to blood loss, acute     Health Care Home     Pseudoaneurysm (H)     Diastolic heart failure (H)     Chronic pancreatitis (H)     Pancreatitis, gallstone     Type 2 diabetes mellitus with hyperglycemia (H)     Morbid obesity due to excess calories (H)     Herpes zoster without complication     Hyperlipidemia LDL goal <70     Type 2 diabetes mellitus with microalbuminuria, with long-term current use of insulin (H)       Past Medical History:   Diagnosis Date     Anemia      Arrhythmia      Atrial fibrillation (H) 9/17/2013     Chronic infection     ESBL     Chronic kidney disease      Diabetes mellitus     type 2     Difficulty in walking(719.7)      Gastro-oesophageal reflux disease      History of blood transfusion      Hypertension      Obese      Pancreatic disease      Pancreatitis      Sleep apnea     Bipap at night     Thrombosis of leg        Past Surgical History:   Procedure Laterality Date     ENDOSCOPIC INSERTION TUBE GASTROSTOMY  9/20/2013    Procedure: ENDOSCOPIC INSERTION TUBE GASTROSTOMY;  Exchange of Gastrojejunostomy Tube , dilation of cyst gastrostomy, endoscopic pancreatocolengiogram with dilation of cyst gastrostomy and stent removal, exchange of cyst duodenostomy stent;  Surgeon: Negro Munguia MD;  Location: [facility]     ENDOSCOPIC INSERTION TUBE JEJUNOSTOMY  8/19/2013    Procedure: ENDOSCOPIC INSERTION TUBE JEJUNOSTOMY;;  Surgeon: Drew Palacios MD;  [facility]     ENDOSCOPIC RETROGRADE CHOLANGIOPANCREATOGRAM  8/19/2013     Procedure: ENDOSCOPIC RETROGRADE CHOLANGIOPANCREATOGRAM;;  Surgeon: Drew Palacios MD;  [facility]     ENDOSCOPIC RETROGRADE CHOLANGIOPANCREATOGRAM  9/12/2013    Procedure: ENDOSCOPIC RETROGRADE CHOLANGIOPANCREATOGRAM;  Endoscopic Retrograde Cholangiopancreatogram with necrosectomy, dilation of wall of necrosis, stent removal x 4 and gastro-jejunal tube placement.;  Surgeon: Negro Munguia MD;  [facility]     ENDOSCOPIC RETROGRADE CHOLANGIOPANCREATOGRAM  10/7/2013    Procedure: ENDOSCOPIC RETROGRADE CHOLANGIOPANCREATOGRAM;  endoscopic retrograde cholangiopancreatogram, cyst gastrostomy stent exchange, necrosectomy.;  Surgeon: Drew Palacios MD;  [facility]     ENDOSCOPIC RETROGRADE CHOLANGIOPANCREATOGRAM  11/18/2013    Procedure: ENDOSCOPIC RETROGRADE CHOLANGIOPANCREATOGRAM;  endoscopic retrograde cholangiopancreatogram with necrosectomy;  Surgeon: Drew Palacios MD;  [facility]     ENDOSCOPIC RETROGRADE CHOLANGIOPANCREATOGRAM  3/12/2014    Procedure: ENDOSCOPIC RETROGRADE CHOLANGIOPANCREATOGRAM;  Endoscopic Retrograde Cholangiopancreatogram with Stent Placement in Cyst Gastrostomy;  Surgeon: Winter Bowden MD;  [facility]     ENDOSCOPIC RETROGRADE CHOLANGIOPANCREATOGRAPHY, LITHOTRIPSY PANCREAS, COMBINED  9/19/2013    Procedure: COMBINED ENDOSCOPIC RETROGRADE CHOLANGIOPANCREATOGRAPHY, LITHOTRIPSY PANCREAS;  Endoscopic Retrograde Cholangiopancreatogram, placement of 2 stents in cyst gastrostomy;  Surgeon: Drew Palacios MD;  [facility]     ENDOSCOPIC ULTRASOUND UPPER GASTROINTESTINAL TRACT (GI)  7/22/2013    Procedure: ENDOSCOPIC ULTRASOUND UPPER GASTROINTESTINAL TRACT (GI);  Endoscopic Ultrasound;  Surgeon: Drew Palacios MD;  [facility]     ENDOSCOPIC ULTRASOUND UPPER GASTROINTESTINAL TRACT (GI)  8/19/2013    Procedure: ENDOSCOPIC ULTRASOUND UPPER GASTROINTESTINAL TRACT (GI);  Endoscopic ultrasound with fine needle aspiration,  Endorscopic retrograde  cholangiopancreatogram with cystgastrostomy,       ENDOSCOPIC ULTRASOUND UPPER GASTROINTESTINAL TRACT (GI)  9/19/2013    Procedure: ENDOSCOPIC ULTRASOUND UPPER GASTROINTESTINAL TRACT (GI);  Endoscopic Ultrasound of Upper Gastrointestinal Tract;  Surgeon: Drew Palacios MD;  [facility]     ENDOSCOPIC ULTRASOUND UPPER GASTROINTESTINAL TRACT (GI)  8/7/2014    Procedure: ENDOSCOPIC ULTRASOUND, ESOPHAGOSCOPY / UPPER GASTROINTESTINAL TRACT (GI);  Surgeon: Adrian Plaza MD;  [facility]     ESOPHAGOSCOPY, GASTROSCOPY, DUODENOSCOPY (EGD), COMBINED  12/17/2013    Procedure: COMBINED ESOPHAGOSCOPY, GASTROSCOPY, DUODENOSCOPY (EGD);  EGD with stent removal and GJ tube replacement;  Surgeon: Negro Munguia MD;  [facility]     ESOPHAGOSCOPY, GASTROSCOPY, DUODENOSCOPY (EGD), COMBINED  12/14/2013    Procedure: COMBINED ESOPHAGOSCOPY, GASTROSCOPY, DUODENOSCOPY (EGD);;  Surgeon: Winter Bowden MD;  [facility]     ESOPHAGOSCOPY, GASTROSCOPY, DUODENOSCOPY (EGD), COMBINED  7/29/2014    Procedure: COMBINED ESOPHAGOSCOPY, GASTROSCOPY, DUODENOSCOPY (EGD);  Surgeon: Negro Munguia MD;  [facility]     GASTROSTOMY TUBE       HC COLONOSCOPY THRU STOMA, DIAGNOSTIC  2007    Normal     LAPAROSCOPIC CHOLECYSTECTOMY  7/29/2014    Procedure: LAPAROSCOPIC CHOLECYSTECTOMY;  Surgeon: Olga Bruner MD;  Location: [facility]     PICC INSERTION  12/20/2013    5fr DL Power PICC, 41cm (1cm external) in the R basilic vein w/ tip in the  mid SVC.       @[facility]@    No current outpatient prescriptions on file.       Allergies   Allergen Reactions     Lipitor [Atorvastatin Calcium] Cramps     Leg cramps       Pt reports that he has never smoked. He has never used smokeless tobacco. He reports that he drinks alcohol. He reports that he does not use illicit drugs.    Exam:  There were no vitals taken for this visit.    Awake, Alert OX3  Lungs - CTA bilaterally  CV - RRR, no murmurs, distal pulses intact  Abd -  soft, non-distended, non-tender, +BS  Extr - No cyanosis or edema    A/P 73 year old year old male in need of colonoscopy for screening. Risks, benefits, alternatives, and complications were discussed including the possibility of perforation and the patient agreed to proceed    Adrian Chowdary MD

## 2018-11-29 ENCOUNTER — TELEPHONE (OUTPATIENT)
Dept: FAMILY MEDICINE | Facility: CLINIC | Age: 73
End: 2018-11-29

## 2018-11-29 NOTE — TELEPHONE ENCOUNTER
"  Panel Management Review      Patient has the following on his problem list:     Diabetes    ASA: Passed    Last A1C  Lab Results   Component Value Date    A1C 8.0 07/24/2018    A1C 8.0 03/13/2018    A1C 7.7 06/09/2017    A1C 9.0 02/21/2017    A1C 8.2 07/15/2016     A1C tested: MONITOR    Last LDL:    Lab Results   Component Value Date    CHOL 103 03/16/2018     Lab Results   Component Value Date    HDL 45 03/16/2018     Lab Results   Component Value Date    LDL 44 03/16/2018     Lab Results   Component Value Date    TRIG 68 03/16/2018     Lab Results   Component Value Date    CHOLHDLRATIO 3.7 01/19/2015     Lab Results   Component Value Date    NHDL 58 03/16/2018       Is the patient on a Statin? NO             Is the patient on Aspirin? YES    Medications     HMG CoA Reductase Inhibitors    STATIN NOT PRESCRIBED, INTENTIONAL,          Last three blood pressure readings:  BP Readings from Last 3 Encounters:   11/08/18 126/70   09/11/18 122/70   03/15/18 137/75       Date of last diabetes office visit: 9/11/2018     Tobacco History:     History   Smoking Status     Never Smoker   Smokeless Tobacco     Never Used           Composite cancer screening  Chart review shows that this patient is due/due soon for the following None  Summary:    Patient is due/failing the following:   A1C    Action needed:   Routed to provider for review.    Type of outreach:    wait to contact untill message to provider is addressed    Questions for provider review:    Orville is on the panel Mgmt. List \"A1C\" was written down to contact but happened to see this note under his last A1C done on 7/24/2018. Should he be seen also?                                                                         Entered by Shad Bucio MD at 7/24/2018  7:13 AM   Read by Orville Plasencia at 9/12/2018  4:48 PM   Covering for PCP (out of office)- your A1C is stable at 8.  Looks like your provider would like you to improve this a bit to get it under 8.  I " see that you have been following with Daria the pharmacist for help with your diabetes management- I would recommend either scheduling an appointment with her at [phone] or scheduling an appointment in the primary care clinic for further discussion.                                                                    Heidi Ortiz       Chart routed to Provider .

## 2018-12-04 NOTE — TELEPHONE ENCOUNTER
Patient's wife Socorro calling back. Consent to discuss PHI on file. Socorro notified need for clinic visit and labs. Scheduled visit.      Emily MAHMOOD RN

## 2018-12-11 ENCOUNTER — OFFICE VISIT (OUTPATIENT)
Dept: FAMILY MEDICINE | Facility: CLINIC | Age: 73
End: 2018-12-11
Payer: COMMERCIAL

## 2018-12-11 VITALS
BODY MASS INDEX: 36.88 KG/M2 | DIASTOLIC BLOOD PRESSURE: 72 MMHG | HEIGHT: 69 IN | WEIGHT: 249 LBS | SYSTOLIC BLOOD PRESSURE: 120 MMHG | HEART RATE: 64 BPM | TEMPERATURE: 97 F | OXYGEN SATURATION: 99 % | RESPIRATION RATE: 8 BRPM

## 2018-12-11 DIAGNOSIS — N40.0 BENIGN NON-NODULAR PROSTATIC HYPERPLASIA: ICD-10-CM

## 2018-12-11 DIAGNOSIS — Z79.4 TYPE 2 DIABETES MELLITUS WITH DIABETIC PERIPHERAL ANGIOPATHY WITHOUT GANGRENE, WITH LONG-TERM CURRENT USE OF INSULIN (H): ICD-10-CM

## 2018-12-11 DIAGNOSIS — E78.5 HYPERLIPIDEMIA LDL GOAL <100: Primary | ICD-10-CM

## 2018-12-11 DIAGNOSIS — E66.01 MORBID OBESITY DUE TO EXCESS CALORIES (H): ICD-10-CM

## 2018-12-11 DIAGNOSIS — I10 HYPERTENSION GOAL BP (BLOOD PRESSURE) < 140/90: ICD-10-CM

## 2018-12-11 DIAGNOSIS — E11.65 TYPE 2 DIABETES MELLITUS WITH HYPERGLYCEMIA, WITH LONG-TERM CURRENT USE OF INSULIN (H): ICD-10-CM

## 2018-12-11 DIAGNOSIS — Z79.4 TYPE 2 DIABETES MELLITUS WITH HYPERGLYCEMIA, WITH LONG-TERM CURRENT USE OF INSULIN (H): ICD-10-CM

## 2018-12-11 DIAGNOSIS — E11.51 TYPE 2 DIABETES MELLITUS WITH DIABETIC PERIPHERAL ANGIOPATHY WITHOUT GANGRENE, WITH LONG-TERM CURRENT USE OF INSULIN (H): ICD-10-CM

## 2018-12-11 LAB
ALT SERPL W P-5'-P-CCNC: 63 U/L (ref 0–70)
ANION GAP SERPL CALCULATED.3IONS-SCNC: 5 MMOL/L (ref 3–14)
BUN SERPL-MCNC: 21 MG/DL (ref 7–30)
CALCIUM SERPL-MCNC: 8.7 MG/DL (ref 8.5–10.1)
CHLORIDE SERPL-SCNC: 104 MMOL/L (ref 94–109)
CHOLEST SERPL-MCNC: 112 MG/DL
CO2 SERPL-SCNC: 28 MMOL/L (ref 20–32)
CREAT SERPL-MCNC: 0.99 MG/DL (ref 0.66–1.25)
GFR SERPL CREATININE-BSD FRML MDRD: 74 ML/MIN/1.7M2
GLUCOSE SERPL-MCNC: 139 MG/DL (ref 70–99)
HBA1C MFR BLD: 8 % (ref 0–5.6)
HDLC SERPL-MCNC: 48 MG/DL
LDLC SERPL CALC-MCNC: 49 MG/DL
NONHDLC SERPL-MCNC: 64 MG/DL
POTASSIUM SERPL-SCNC: 5.3 MMOL/L (ref 3.4–5.3)
SODIUM SERPL-SCNC: 137 MMOL/L (ref 133–144)
TRIGL SERPL-MCNC: 74 MG/DL

## 2018-12-11 PROCEDURE — 99214 OFFICE O/P EST MOD 30 MIN: CPT | Performed by: NURSE PRACTITIONER

## 2018-12-11 PROCEDURE — 80061 LIPID PANEL: CPT | Performed by: NURSE PRACTITIONER

## 2018-12-11 PROCEDURE — 80048 BASIC METABOLIC PNL TOTAL CA: CPT | Performed by: NURSE PRACTITIONER

## 2018-12-11 PROCEDURE — 84460 ALANINE AMINO (ALT) (SGPT): CPT | Performed by: NURSE PRACTITIONER

## 2018-12-11 PROCEDURE — 36415 COLL VENOUS BLD VENIPUNCTURE: CPT | Performed by: NURSE PRACTITIONER

## 2018-12-11 PROCEDURE — 83036 HEMOGLOBIN GLYCOSYLATED A1C: CPT | Performed by: NURSE PRACTITIONER

## 2018-12-11 RX ORDER — INSULIN GLARGINE 100 [IU]/ML
20 INJECTION, SOLUTION SUBCUTANEOUS 2 TIMES DAILY
Qty: 36 ML | Refills: 3 | Status: SHIPPED | OUTPATIENT
Start: 2018-12-11 | End: 2019-05-14

## 2018-12-11 RX ORDER — FUROSEMIDE 20 MG
20 TABLET ORAL DAILY PRN
Qty: 90 TABLET | Refills: 3 | Status: SHIPPED | OUTPATIENT
Start: 2018-12-11 | End: 2021-12-09

## 2018-12-11 RX ORDER — TAMSULOSIN HYDROCHLORIDE 0.4 MG/1
CAPSULE ORAL
Qty: 90 CAPSULE | Refills: 3 | Status: SHIPPED | OUTPATIENT
Start: 2018-12-11 | End: 2019-12-27

## 2018-12-11 ASSESSMENT — MIFFLIN-ST. JEOR: SCORE: 1859.84

## 2018-12-11 NOTE — PATIENT INSTRUCTIONS
Thank you for choosing [facility].  You may be receiving a survey in the mail from [employer] regarding your visit today.  Please take a few minutes to complete and return the survey to let us know how we are doing.      If you have questions or concerns, please contact us via "[employer]" or you can contact your care team at [phone].    Our Clinic hours are:  Monday 6:40 am  to 7:00 pm  Tuesday -Friday 6:40 am to 5:00 pm    The [place] outpatient lab hours are:  Monday - Friday 6:10 am to 4:45 pm  Saturdays 7:00 am to 11:00 am  Appointments are required, call [phone]    If you have clinical questions after hours or would like to schedule an appointment,  call the clinic at [phone].

## 2018-12-11 NOTE — NURSING NOTE
"  SUBJECTIVE:   Orville Plasencia is a 73 year old male who presents to clinic today for the following health issues:      Diabetes Follow-up      Patient is checking blood sugars: 3 times a week  on occasion if not at home    Diabetic concerns: None     Symptoms of hypoglycemia (low blood sugar): none     Paresthesias (numbness or burning in feet) or sores: None     Date of last diabetic eye exam: due, will schedule    Diabetes Management Resources    Hyperlipidemia Follow-Up      Rate your low fat/cholesterol diet?: not monitoring fat    Taking statin?  No    Other lipid medications/supplements?:  Coconut oil    Hypertension Follow-up      Outpatient blood pressures are not being checked.    Low Salt Diet: low salt    BP Readings from Last 2 Encounters:   11/08/18 126/70   09/11/18 122/70     Hemoglobin A1C (%)   Date Value   07/24/2018 8.0 (H)   03/13/2018 8.0 (H)     LDL Cholesterol Calculated (mg/dL)   Date Value   03/16/2018 44   02/21/2017 62       Amount of exercise or physical activity: none other than outdoor activities.    Problems taking medications regularly: No    Medication side effects: none    Diet: regular (no restrictions)        {additional problems for provider to add:154645}    Problem list and histories reviewed & adjusted, as indicated.  Additional history: {NONE - AS DOCUMENTED:444280::\"as documented\"}    {HIST REVIEW/ LINKS 2:412419}    Reviewed and updated as needed this visit by clinical staff  Allergies  Meds       Reviewed and updated as needed this visit by Provider         {PROVIDER CHARTING PREFERENCE:445073}  "

## 2018-12-12 DIAGNOSIS — R80.9 TYPE 2 DIABETES MELLITUS WITH MICROALBUMINURIA, WITH LONG-TERM CURRENT USE OF INSULIN (H): Primary | ICD-10-CM

## 2018-12-12 DIAGNOSIS — Z79.4 TYPE 2 DIABETES MELLITUS WITH MICROALBUMINURIA, WITH LONG-TERM CURRENT USE OF INSULIN (H): Primary | ICD-10-CM

## 2018-12-12 DIAGNOSIS — E11.29 TYPE 2 DIABETES MELLITUS WITH MICROALBUMINURIA, WITH LONG-TERM CURRENT USE OF INSULIN (H): Primary | ICD-10-CM

## 2019-04-24 DIAGNOSIS — E11.51 TYPE 2 DIABETES MELLITUS WITH DIABETIC PERIPHERAL ANGIOPATHY WITHOUT GANGRENE (H): ICD-10-CM

## 2019-04-24 NOTE — TELEPHONE ENCOUNTER
"Requested Prescriptions   Pending Prescriptions Disp Refills     ACCU-CHEK COMPACT PLUS test strip [Pharmacy Med Name: ACCU-CHEK COMPACT PLUS [place]] 408 strip 1     Sig: USE 1 STRIP TO CHECK GLUCOSE 4 TIMES DAILY       Diabetic Supplies Protocol Passed - 4/24/2019  9:39 AM        Passed - Medication is active on med list        Passed - Patient is 18 years of age or older        Passed - Recent (6 mo) or future (30 days) visit within the authorizing provider's specialty     Patient had office visit in the last 6 months or has a visit in the next 30 days with authorizing provider.  See \"Patient Info\" tab in inbasket, or \"Choose Columns\" in Meds & Orders section of the refill encounter.            Last Written Prescription Date:  12/11/18  Last Fill Quantity: 204,  # refills: 3   Last office visit: 12/11/2018 with prescribing provider:  Asya   Future Office Visit:      "

## 2019-04-25 ENCOUNTER — TELEPHONE (OUTPATIENT)
Dept: FAMILY MEDICINE | Facility: CLINIC | Age: 74
End: 2019-04-25

## 2019-04-25 NOTE — LETTER
April 25, 2019      Orville Plasencia  [address] [address] [place] [id number]        Dear Anahi Jacinto's Care Team has been reviewing your chart and it appears that there are aspects of your care that could be improved. At this time you are due for an A1c lab.  If you could plan to do that in the near future it would be very helpful.  We are trying to help our patients achieve their health care goals.    If you have any questions, please feel free to call the clinic at [phone].            Anahi Sky, JAHAIRA CNP

## 2019-04-25 NOTE — TELEPHONE ENCOUNTER
Panel Management Review      Patient has the following on his problem list:       Composite cancer screening  Chart review shows that this patient is due/due soon for the following None  Summary:    Patient is due/failing the following:   A1C    Action needed:   Patient needs non-fasting lab only appointment    Type of outreach:    Sent letter.    Questions for provider review:    None                                                                                                                                    Heidi Ortiz       Chart routed to  .

## 2019-04-26 DIAGNOSIS — E11.65 TYPE 2 DIABETES MELLITUS WITH HYPERGLYCEMIA, WITH LONG-TERM CURRENT USE OF INSULIN (H): ICD-10-CM

## 2019-04-26 DIAGNOSIS — Z79.4 TYPE 2 DIABETES MELLITUS WITH HYPERGLYCEMIA, WITH LONG-TERM CURRENT USE OF INSULIN (H): ICD-10-CM

## 2019-04-26 NOTE — TELEPHONE ENCOUNTER
Rx refill request for Relion pen needles.  Date of last issue was on 10-29-18 for a qty of 500 with 1 refill.      Date of last ov was 12-11-18    Pt past due for hgb a1c.    Sandra Willis  [facility] RN

## 2019-04-26 NOTE — LETTER
April 26, 2019      Orville Rogerwayne Plasencia  [address] [address] [place] [id number]        Dear Orville,     Your pen needle refill is being filled for 1 month refill only due to:  Patient needs labs . You are due for your A1C recheck.     Anahi Sky advises that you are due now for your A1C recheck, and that she wants to see you in clinic in June.    Please call [phone] to arrange your lab appt.  Please call [phone] to arrange your office visit appointment.    Thank you.    Sincerely,        Anahi Sky, JAHAIRA IGLESIAS/ Gerda Beasley RN        lar

## 2019-04-26 NOTE — TELEPHONE ENCOUNTER
Medication is being filled for 1 time refill only due to:  Patient needs labs a1c is due.     Reminder letter sent to pt.  Gerda Beasley RN

## 2019-04-30 DIAGNOSIS — E11.65 TYPE 2 DIABETES MELLITUS WITH HYPERGLYCEMIA, WITH LONG-TERM CURRENT USE OF INSULIN (H): ICD-10-CM

## 2019-04-30 DIAGNOSIS — Z79.4 TYPE 2 DIABETES MELLITUS WITH HYPERGLYCEMIA, WITH LONG-TERM CURRENT USE OF INSULIN (H): ICD-10-CM

## 2019-04-30 NOTE — TELEPHONE ENCOUNTER
"Requested Prescriptions   Pending Prescriptions Disp Refills     insulin glargine (BASAGLAR KWIKPEN) 100 UNIT/ML pen 36 mL 3     Sig: Inject 20 Units Subcutaneous 2 times daily   Last Written Prescription Date:  12/11/18  Last Fill Quantity: 36ml,  # refills: 3   Last office visit: 12/11/2018 with prescribing provider:  Anahi Sky     Future Office Visit:        Long Acting Insulin Protocol Failed - 4/30/2019  4:33 PM        Failed - HgbA1C in past 3 or 6 months     If HgbA1C is 8 or greater, it needs to be on file within the past 3 months.  If less than 8, must be on file within the past 6 months.     Recent Labs   Lab Test 12/11/18  1005   A1C 8.0*             Passed - Blood pressure less than 140/90 in past 6 months     BP Readings from Last 3 Encounters:   12/11/18 120/72   11/08/18 126/70   09/11/18 122/70                 Passed - LDL on file in past 12 months     Recent Labs   Lab Test 12/11/18  1005   LDL 49             Passed - Microalbumin on file in past 12 months     Recent Labs   Lab Test 03/16/18  0706   MICROL 36   UMALCR 43.15*             Passed - Serum creatinine on file in past 12 months     Recent Labs   Lab Test 12/11/18  1005   CR 0.99             Passed - Medication is active on med list        Passed - Patient is age 18 or older        Passed - Recent (6 mo) or future (30 days) visit within the authorizing provider's specialty     Patient had office visit in the last 6 months or has a visit in the next 30 days with authorizing provider or within the authorizing provider's specialty.  See \"Patient Info\" tab in inbasket, or \"Choose Columns\" in Meds & Orders section of the refill encounter.              "

## 2019-05-02 RX ORDER — INSULIN GLARGINE 100 [IU]/ML
20 INJECTION, SOLUTION SUBCUTANEOUS 2 TIMES DAILY
Qty: 36 ML | Refills: 3 | OUTPATIENT
Start: 2019-05-02

## 2019-05-02 NOTE — TELEPHONE ENCOUNTER
RN spoke with wife, Socorro (authorization to discuss patient info) to let her know patient is due for A1C - appt scheduled.  He will be due in about 1 month for appt.  Wife states he has enough med to get to appt.  Sue NICHOLSON RN

## 2019-05-03 DIAGNOSIS — E11.29 TYPE 2 DIABETES MELLITUS WITH MICROALBUMINURIA, WITH LONG-TERM CURRENT USE OF INSULIN (H): ICD-10-CM

## 2019-05-03 DIAGNOSIS — Z79.4 TYPE 2 DIABETES MELLITUS WITH MICROALBUMINURIA, WITH LONG-TERM CURRENT USE OF INSULIN (H): ICD-10-CM

## 2019-05-03 DIAGNOSIS — E11.65 TYPE 2 DIABETES MELLITUS WITH HYPERGLYCEMIA, WITH LONG-TERM CURRENT USE OF INSULIN (H): ICD-10-CM

## 2019-05-03 DIAGNOSIS — Z79.4 TYPE 2 DIABETES MELLITUS WITH HYPERGLYCEMIA, WITH LONG-TERM CURRENT USE OF INSULIN (H): ICD-10-CM

## 2019-05-03 DIAGNOSIS — R80.9 TYPE 2 DIABETES MELLITUS WITH MICROALBUMINURIA, WITH LONG-TERM CURRENT USE OF INSULIN (H): ICD-10-CM

## 2019-05-03 LAB
HBA1C MFR BLD: 8.9 % (ref 0–5.6)
TSH SERPL DL<=0.005 MIU/L-ACNC: 1.34 MU/L (ref 0.4–4)

## 2019-05-03 PROCEDURE — 83036 HEMOGLOBIN GLYCOSYLATED A1C: CPT | Performed by: FAMILY MEDICINE

## 2019-05-03 PROCEDURE — 36415 COLL VENOUS BLD VENIPUNCTURE: CPT | Performed by: FAMILY MEDICINE

## 2019-05-03 PROCEDURE — 84443 ASSAY THYROID STIM HORMONE: CPT | Performed by: FAMILY MEDICINE

## 2019-05-13 NOTE — PROGRESS NOTES
SUBJECTIVE:   Orville Plasencia is a 73 year old male who presents to clinic today for the following   health issues:    Diabetes Follow-up    Patient is checking blood sugars: 3 times daily. Before each meal.  Blood sugar testing frequency justification: Uncontrolled diabetes  Results are as follows:         am - up and down 80, 200's Afternoon: 140-180 Dinner: 160's     Diabetic concerns: None     Symptoms of hypoglycemia (low blood sugar): 4 weeks ago blood sugar was 40      Paresthesias (numbness or burning in feet) or sores: No     Date of last diabetic eye exam: is due   Lab Results   Component Value Date    A1C 8.9 05/03/2019    A1C 8.0 12/11/2018    A1C 8.0 07/24/2018    A1C 8.0 03/13/2018    A1C 7.7 06/09/2017           Diabetes Management Resources    Hyperlipidemia Follow-Up      Rate your low fat/cholesterol diet?: poor    Taking statin?  No    Other lipid medications/supplements?:  none      BP Readings from Last 2 Encounters:   12/11/18 120/72   11/08/18 126/70     Hemoglobin A1C (%)   Date Value   05/03/2019 8.9 (H)   12/11/2018 8.0 (H)     LDL Cholesterol Calculated (mg/dL)   Date Value   12/11/2018 49   03/16/2018 44       PROBLEMS TO ADD ON...  Obesity- patient is very active outdoors with gardening and fishing- wife states he needs to reduce portion size.     Additional history: as documented    Reviewed  and updated as needed this visit by clinical staff         Reviewed and updated as needed this visit by Provider         Patient Active Problem List   Diagnosis     Sensorineural hearing loss     RADHA (obstructive sleep apnea) with REM related hypoventilation-Moderate (AHI 26)     Advanced directives, counseling/discussion     GERD (gastroesophageal reflux disease)     Generalized weakness     Anasarca     SVT (supraventricular tachycardia) (H)     Necrotizing pancreatitis     Atrial fibrillation (H)     Anemia due to blood loss, acute     Health Care Home     Pseudoaneurysm (H)     Diastolic  heart failure (H)     Chronic pancreatitis (H)     Pancreatitis, gallstone     Type 2 diabetes mellitus with hyperglycemia (H)     Morbid obesity due to excess calories (H)     Herpes zoster without complication     Hyperlipidemia LDL goal <70     Type 2 diabetes mellitus with microalbuminuria, with long-term current use of insulin (H)     Past Surgical History:   Procedure Laterality Date     COLONOSCOPY N/A 11/8/2018    Procedure: colonoscopy;  Surgeon: Adrian Chowdary MD;  Location: [facility]     ENDOSCOPIC INSERTION TUBE GASTROSTOMY  9/20/2013    Procedure: ENDOSCOPIC INSERTION TUBE GASTROSTOMY;  Exchange of Gastrojejunostomy Tube , dilation of cyst gastrostomy, endoscopic pancreatocolengiogram with dilation of cyst gastrostomy and stent removal, exchange of cyst duodenostomy stent;  Surgeon: Negro Munguia MD;  [facility]     ENDOSCOPIC INSERTION TUBE JEJUNOSTOMY  8/19/2013    Procedure: ENDOSCOPIC INSERTION TUBE JEJUNOSTOMY;;  Surgeon: Drew Palacios MD;  [facility]     ENDOSCOPIC RETROGRADE CHOLANGIOPANCREATOGRAM  8/19/2013    Procedure: ENDOSCOPIC RETROGRADE CHOLANGIOPANCREATOGRAM;;  Surgeon: Drew Palacios MD;  [facility]     ENDOSCOPIC RETROGRADE CHOLANGIOPANCREATOGRAM  9/12/2013    Procedure: ENDOSCOPIC RETROGRADE CHOLANGIOPANCREATOGRAM;  Endoscopic Retrograde Cholangiopancreatogram with necrosectomy, dilation of wall of necrosis, stent removal x 4 and gastro-jejunal tube placement.;  Surgeon: Negro Munguia MD;  [facility]     ENDOSCOPIC RETROGRADE CHOLANGIOPANCREATOGRAM  10/7/2013    Procedure: ENDOSCOPIC RETROGRADE CHOLANGIOPANCREATOGRAM;  endoscopic retrograde cholangiopancreatogram, cyst gastrostomy stent exchange, necrosectomy.;  Surgeon: Drew Palacios MD;  [facility]     ENDOSCOPIC RETROGRADE CHOLANGIOPANCREATOGRAM  11/18/2013    Procedure: ENDOSCOPIC RETROGRADE CHOLANGIOPANCREATOGRAM;  endoscopic retrograde cholangiopancreatogram with necrosectomy;  Surgeon:  Drew Palacios MD;  [facility]     ENDOSCOPIC RETROGRADE CHOLANGIOPANCREATOGRAM  3/12/2014    Procedure: ENDOSCOPIC RETROGRADE CHOLANGIOPANCREATOGRAM;  Endoscopic Retrograde Cholangiopancreatogram with Stent Placement in Cyst Gastrostomy;  Surgeon: Winter Bowden MD;  [facility]     ENDOSCOPIC RETROGRADE CHOLANGIOPANCREATOGRAPHY, LITHOTRIPSY PANCREAS, COMBINED  9/19/2013    Procedure: COMBINED ENDOSCOPIC RETROGRADE CHOLANGIOPANCREATOGRAPHY, LITHOTRIPSY PANCREAS;  Endoscopic Retrograde Cholangiopancreatogram, placement of 2 stents in cyst gastrostomy;  Surgeon: Drew Palacios MD;  [facility]     ENDOSCOPIC ULTRASOUND UPPER GASTROINTESTINAL TRACT (GI)  7/22/2013    Procedure: ENDOSCOPIC ULTRASOUND UPPER GASTROINTESTINAL TRACT (GI);  Endoscopic Ultrasound;  Surgeon: Drew Palacios MD;  [facility]     ENDOSCOPIC ULTRASOUND UPPER GASTROINTESTINAL TRACT (GI)  8/19/2013    Procedure: ENDOSCOPIC ULTRASOUND UPPER GASTROINTESTINAL TRACT (GI);  Endoscopic ultrasound with fine needle aspiration,  Endorscopic retrograde cholangiopancreatogram with cystgastrostomy,       ENDOSCOPIC ULTRASOUND UPPER GASTROINTESTINAL TRACT (GI)  9/19/2013    Procedure: ENDOSCOPIC ULTRASOUND UPPER GASTROINTESTINAL TRACT (GI);  Endoscopic Ultrasound of Upper Gastrointestinal Tract;  Surgeon: Drew Palacios MD;  [facility]     ENDOSCOPIC ULTRASOUND UPPER GASTROINTESTINAL TRACT (GI)  8/7/2014    Procedure: ENDOSCOPIC ULTRASOUND, ESOPHAGOSCOPY / UPPER GASTROINTESTINAL TRACT (GI);  Surgeon: Adrian Plaza MD;  [facility]     ESOPHAGOSCOPY, GASTROSCOPY, DUODENOSCOPY (EGD), COMBINED  12/17/2013    Procedure: COMBINED ESOPHAGOSCOPY, GASTROSCOPY, DUODENOSCOPY (EGD);  EGD with stent removal and GJ tube replacement;  Surgeon: Negro Munguia MD;  [facility]     ESOPHAGOSCOPY, GASTROSCOPY, DUODENOSCOPY (EGD), COMBINED  12/14/2013    Procedure: COMBINED ESOPHAGOSCOPY, GASTROSCOPY, DUODENOSCOPY (EGD);;   "Surgeon: Winter oBwden MD;  [facility]     ESOPHAGOSCOPY, GASTROSCOPY, DUODENOSCOPY (EGD), COMBINED  7/29/2014    Procedure: COMBINED ESOPHAGOSCOPY, GASTROSCOPY, DUODENOSCOPY (EGD);  Surgeon: Negro Munguia MD;  [facility]     GASTROSTOMY TUBE       HC COLONOSCOPY THRU STOMA, DIAGNOSTIC  2007    Normal     LAPAROSCOPIC CHOLECYSTECTOMY  7/29/2014    Procedure: LAPAROSCOPIC CHOLECYSTECTOMY;  Surgeon: Olga Bruner MD;  [facility]     PICC INSERTION  12/20/2013    5fr DL Power PICC, 41cm (1cm external) in the R basilic vein w/ tip in the  mid SVC.       Social History     Tobacco Use     Smoking status: Never Smoker     Smokeless tobacco: Never Used   Substance Use Topics     Alcohol use: Yes     Comment: rare     Family History   Problem Relation Age of Onset     Diabetes Mother      Eye Disorder Mother         blind from Diab.     Heart Disease Mother      Obesity Mother      Alcohol/Drug Father      Diabetes Maternal Grandmother      Eye Disorder Brother         Macular Degeneration     Cancer Sister         Brain cancer     C.A.D. Brother         2 bothers 1 sister in 60s     Cancer - colorectal No family hx of            ROS:  Constitutional, HEENT, cardiovascular, pulmonary, GI, , musculoskeletal, neuro, skin, endocrine and psych systems are negative, except as otherwise noted.    OBJECTIVE:     /74 (BP Location: Left arm, Patient Position: Sitting, Cuff Size: Adult Regular)   Pulse 66   Temp 96.7  F (35.9  C) (Tympanic)   Resp 16   Ht 1.753 m (5' 9\")   Wt 112.7 kg (248 lb 6.4 oz)   SpO2 97%   BMI 36.68 kg/m    Body mass index is 36.68 kg/m .  GENERAL: alert, no distress and obese  RESP: lungs clear to auscultation - no rales, rhonchi or wheezes  CV: regular rate and rhythm, normal S1 S2, no S3 or S4, no murmur, click or rub, no peripheral edema and peripheral pulses strong  MS: no gross musculoskeletal defects noted, no edema  Diabetic foot exam: normal DP and " PT pulses, no trophic changes or ulcerative lesions, normal sensory exam, normal monofilament exam, dry cracking heels, onychomycosis and nail exam onychomycosis of the toenails    Diagnostic Test Results:  none     ASSESSMENT/PLAN:       1. Type 2 diabetes mellitus with hyperglycemia, with long-term current use of insulin (H)  - discussed referral to diabetic ed or MTM- patient declined at this time- wants to work on diet. I will increase Basaglar from 20 units in AM to 23 units in AM and eventually increase bedtime dose from 20 units to 23 units if needed.   - Albumin Random Urine Quantitative with Creat Ratio  - Hemoglobin A1c; Future  - insulin glargine (BASAGLAR KWIKPEN) 100 UNIT/ML pen; Inject 23 Units Subcutaneous 2 times daily  Dispense: 36 mL; Refill: 3  - Basic metabolic panel    2. Morbid obesity due to excess calories (H)  Discussed reducing portion sizes/ increasing activity     3. Hyperlipidemia  - well controlled with diet- allergy to statin        Follow up in 3 months with OV and lab work    JAHAIRA Perdue [facility]

## 2019-05-14 ENCOUNTER — OFFICE VISIT (OUTPATIENT)
Dept: FAMILY MEDICINE | Facility: CLINIC | Age: 74
End: 2019-05-14
Payer: MEDICARE

## 2019-05-14 VITALS
BODY MASS INDEX: 36.79 KG/M2 | WEIGHT: 248.4 LBS | HEIGHT: 69 IN | RESPIRATION RATE: 16 BRPM | DIASTOLIC BLOOD PRESSURE: 74 MMHG | SYSTOLIC BLOOD PRESSURE: 124 MMHG | TEMPERATURE: 96.7 F | HEART RATE: 66 BPM | OXYGEN SATURATION: 97 %

## 2019-05-14 DIAGNOSIS — Z79.4 TYPE 2 DIABETES MELLITUS WITH HYPERGLYCEMIA, WITH LONG-TERM CURRENT USE OF INSULIN (H): Primary | ICD-10-CM

## 2019-05-14 DIAGNOSIS — E11.65 TYPE 2 DIABETES MELLITUS WITH HYPERGLYCEMIA, WITH LONG-TERM CURRENT USE OF INSULIN (H): Primary | ICD-10-CM

## 2019-05-14 DIAGNOSIS — E78.5 HYPERLIPIDEMIA LDL GOAL <100: ICD-10-CM

## 2019-05-14 DIAGNOSIS — E66.01 MORBID OBESITY DUE TO EXCESS CALORIES (H): ICD-10-CM

## 2019-05-14 DIAGNOSIS — Z23 NEED FOR VACCINATION: ICD-10-CM

## 2019-05-14 LAB
ANION GAP SERPL CALCULATED.3IONS-SCNC: <1 MMOL/L (ref 3–14)
BUN SERPL-MCNC: 27 MG/DL (ref 7–30)
CALCIUM SERPL-MCNC: 9.2 MG/DL (ref 8.5–10.1)
CHLORIDE SERPL-SCNC: 107 MMOL/L (ref 94–109)
CO2 SERPL-SCNC: 30 MMOL/L (ref 20–32)
CREAT SERPL-MCNC: 0.99 MG/DL (ref 0.66–1.25)
CREAT UR-MCNC: 114 MG/DL
GFR SERPL CREATININE-BSD FRML MDRD: 74 ML/MIN/{1.73_M2}
GLUCOSE SERPL-MCNC: 165 MG/DL (ref 70–99)
MICROALBUMIN UR-MCNC: 49 MG/L
MICROALBUMIN/CREAT UR: 42.72 MG/G CR (ref 0–17)
POTASSIUM SERPL-SCNC: 4.9 MMOL/L (ref 3.4–5.3)
SODIUM SERPL-SCNC: 137 MMOL/L (ref 133–144)

## 2019-05-14 PROCEDURE — 90750 HZV VACC RECOMBINANT IM: CPT | Performed by: NURSE PRACTITIONER

## 2019-05-14 PROCEDURE — 99214 OFFICE O/P EST MOD 30 MIN: CPT | Performed by: NURSE PRACTITIONER

## 2019-05-14 PROCEDURE — 90471 IMMUNIZATION ADMIN: CPT | Performed by: NURSE PRACTITIONER

## 2019-05-14 PROCEDURE — 82043 UR ALBUMIN QUANTITATIVE: CPT | Performed by: NURSE PRACTITIONER

## 2019-05-14 PROCEDURE — 80048 BASIC METABOLIC PNL TOTAL CA: CPT | Performed by: NURSE PRACTITIONER

## 2019-05-14 PROCEDURE — 36415 COLL VENOUS BLD VENIPUNCTURE: CPT | Performed by: NURSE PRACTITIONER

## 2019-05-14 RX ORDER — INSULIN GLARGINE 100 [IU]/ML
23 INJECTION, SOLUTION SUBCUTANEOUS 2 TIMES DAILY
Qty: 36 ML | Refills: 3 | Status: SHIPPED | OUTPATIENT
Start: 2019-05-14 | End: 2020-01-09

## 2019-05-14 ASSESSMENT — MIFFLIN-ST. JEOR: SCORE: 1857.12

## 2019-05-14 NOTE — NURSING NOTE
Shingrix: Patient call to verify insurance coverage and set up appointment.    Patient requesting Shingrix vaccine.  and Patient has verified Shingrix coverage with their insurance? Yes: Per wife at visit today     Screening Questionnaire for Adult Immunization    Are you sick today?   No   Do you have allergies to medications, food, a vaccine component or latex?   No   Have you ever had a serious reaction after receiving a vaccination?   No   Do you have a long-term health problem with heart disease, lung disease, asthma, kidney disease, metabolic disease (e.g. diabetes), anemia, or other blood disorder?   No   Do you have cancer, leukemia, HIV/AIDS, or any other immune system problem?   No   In the past 3 months, have you taken medications that affect  your immune system, such as prednisone, other steroids, or anticancer drugs; drugs for the treatment of rheumatoid arthritis, Crohn s disease, or psoriasis; or have you had radiation treatments?   No   Have you had a seizure, or a brain or other nervous system problem?   No   During the past year, have you received a transfusion of blood or blood     products, or been given immune (gamma) globulin or antiviral drug?   No   For women: Are you pregnant or is there a chance you could become        pregnant during the next month?   No   Have you received any vaccinations in the past 4 weeks?   No     Immunization questionnaire answers were all negative.        Per orders of . Anahi whitten, injection of Shingrix given by Heidi Ortiz. Patient instructed to remain in clinic for 15 minutes afterwards, and to report any adverse reaction to me immediately.       Screening performed by Heidi Ortiz on 5/14/2019 at 8:43 AM.

## 2019-05-14 NOTE — PATIENT INSTRUCTIONS
1. Increase Basaglar to 23 units in the morning and 23 units at night gradually  2. Recheck A1C level in 3 months with an office visit  3. Schedule an foot appointment   4. Schedule eye appointment  5. Urine sample today          Thank you for choosing [facility].  You may be receiving an email and/or telephone survey request from [facility] Customer Experience regarding your visit today.  Please take a few minutes to respond to the survey to let us know how we are doing.      If you have questions or concerns, please contact us via [employer] or you can contact your care team at [phone].    Our Clinic hours are:  Monday 6:40 am  to 7:00 pm  Tuesday -Friday 6:40 am to 5:00 pm    The [place] outpatient lab hours are:  Monday - Friday 6:10 am to 4:45 pm  Saturdays 7:00 am to 11:00 am  Appointments are required, call [phone]    If you have clinical questions after hours or would like to schedule an appointment,  call the clinic at [phone].

## 2019-05-27 DIAGNOSIS — Z79.4 TYPE 2 DIABETES MELLITUS WITH HYPERGLYCEMIA, WITH LONG-TERM CURRENT USE OF INSULIN (H): ICD-10-CM

## 2019-05-27 DIAGNOSIS — E11.65 TYPE 2 DIABETES MELLITUS WITH HYPERGLYCEMIA, WITH LONG-TERM CURRENT USE OF INSULIN (H): ICD-10-CM

## 2019-05-28 NOTE — TELEPHONE ENCOUNTER
"Requested Prescriptions   Pending Prescriptions Disp Refills     RELION PEN NEEDLES 31G X 6 MM miscellaneous [Pharmacy Med Name: RELION PEN NEEDLES [id number] MIS] 500 each 1     Sig:  USE FOR ADMINISTERING INSULIN 5 TIMES DAILY OR AS DIRECTED   Last Written Prescription Date:  4/26/19  Last Fill Quantity: 150 each,  # refills: 0   Last office visit: 5/14/2019 with prescribing provider:  Anahi Sky     Future Office Visit:        Diabetic Supplies Protocol Passed - 5/27/2019 10:26 AM        Passed - Medication is active on med list        Passed - Patient is 18 years of age or older        Passed - Recent (6 mo) or future (30 days) visit within the authorizing provider's specialty     Patient had office visit in the last 6 months or has a visit in the next 30 days with authorizing provider.  See \"Patient Info\" tab in inbasket, or \"Choose Columns\" in Meds & Orders section of the refill encounter.              "

## 2019-05-29 RX ORDER — PEN NEEDLE, DIABETIC 32GX 5/32"
NEEDLE, DISPOSABLE MISCELLANEOUS
Qty: 500 EACH | Refills: 1 | Status: SHIPPED | OUTPATIENT
Start: 2019-05-29 | End: 2020-01-31

## 2019-05-29 NOTE — TELEPHONE ENCOUNTER
Prescription approved per [facility] Refill Protocol.    Bhumika NICHOLSON Rn

## 2019-06-01 DIAGNOSIS — E11.65 TYPE 2 DIABETES MELLITUS WITH HYPERGLYCEMIA, WITH LONG-TERM CURRENT USE OF INSULIN (H): ICD-10-CM

## 2019-06-01 DIAGNOSIS — Z79.4 TYPE 2 DIABETES MELLITUS WITH HYPERGLYCEMIA, WITH LONG-TERM CURRENT USE OF INSULIN (H): ICD-10-CM

## 2019-06-03 RX ORDER — INSULIN GLARGINE 100 [IU]/ML
INJECTION, SOLUTION SUBCUTANEOUS
Qty: 45 ML | Refills: 1 | Status: SHIPPED | OUTPATIENT
Start: 2019-06-03 | End: 2019-09-12

## 2019-06-03 RX ORDER — INSULIN GLARGINE 100 [IU]/ML
INJECTION, SOLUTION SUBCUTANEOUS
Qty: 18 ML | Refills: 1 | Status: SHIPPED | OUTPATIENT
Start: 2019-06-03 | End: 2019-06-03

## 2019-06-03 NOTE — TELEPHONE ENCOUNTER
Prescription approved per [facility] Refill Protocol. And per previous notes 5/14/19 to   1. Increase Basaglar to 23 units in the morning and 23 units at night gradually  Samia Bingham RNC

## 2019-06-03 NOTE — TELEPHONE ENCOUNTER
"Requested Prescriptions   Pending Prescriptions Disp Refills     insulin glargine (BASAGLAR KWIKPEN) 100 UNIT/ML pen [Pharmacy Med Name: BASAGLAR 100UNIT    INJ]  5     Sig: INJECT 20 UNITS SUBCUTANEOUSLY TWICE DAILY   Last Written Prescription Date:  5/14/19  Last Fill Quantity: 36ml,  # refills: 3   Last office visit: 5/14/2019 with prescribing provider:  Anahi Sky     Future Office Visit:        Long Acting Insulin Protocol Passed - 6/1/2019  7:37 AM        Passed - Blood pressure less than 140/90 in past 6 months     BP Readings from Last 3 Encounters:   05/14/19 124/74   12/11/18 120/72   11/08/18 126/70                 Passed - LDL on file in past 12 months     Recent Labs   Lab Test 12/11/18  1005   LDL 49             Passed - Microalbumin on file in past 12 months     Recent Labs   Lab Test 05/14/19  0755   MICROL 49   UMALCR 42.72*             Passed - Serum creatinine on file in past 12 months     Recent Labs   Lab Test 05/14/19  0801   CR 0.99             Passed - HgbA1C in past 3 or 6 months     If HgbA1C is 8 or greater, it needs to be on file within the past 3 months.  If less than 8, must be on file within the past 6 months.     Recent Labs   Lab Test 05/03/19  0645   A1C 8.9*             Passed - Medication is active on med list        Passed - Patient is age 18 or older        Passed - Recent (6 mo) or future (30 days) visit within the authorizing provider's specialty     Patient had office visit in the last 6 months or has a visit in the next 30 days with authorizing provider or within the authorizing provider's specialty.  See \"Patient Info\" tab in inbasket, or \"Choose Columns\" in Meds & Orders section of the refill encounter.              "

## 2019-09-09 DIAGNOSIS — Z79.4 TYPE 2 DIABETES MELLITUS WITH HYPERGLYCEMIA, WITH LONG-TERM CURRENT USE OF INSULIN (H): ICD-10-CM

## 2019-09-09 DIAGNOSIS — E11.65 TYPE 2 DIABETES MELLITUS WITH HYPERGLYCEMIA, WITH LONG-TERM CURRENT USE OF INSULIN (H): ICD-10-CM

## 2019-09-09 RX ORDER — INSULIN GLARGINE 100 [IU]/ML
23 INJECTION, SOLUTION SUBCUTANEOUS 2 TIMES DAILY
Qty: 36 ML | Refills: 3 | Status: CANCELLED | OUTPATIENT
Start: 2019-09-09

## 2019-09-09 NOTE — TELEPHONE ENCOUNTER
"  HAS PATIENT TRIED STATIN THERAPY WITH DIABETES DIAGNOSIS????        Requested Prescriptions   Pending Prescriptions Disp Refills     insulin glargine (BASAGLAR KWIKPEN) 100 UNIT/ML pen 36 mL 3     Sig: Inject 23 Units Subcutaneous 2 times daily   Last Written Prescription Date:  6/3/19  Last Fill Quantity: 45ml,  # refills: 1   Last office visit: 5/14/2019 with prescribing provider:  Anahi Sky     Future Office Visit:        Long Acting Insulin Protocol Failed - 9/9/2019 10:45 AM        Failed - HgbA1C in past 3 or 6 months     If HgbA1C is 8 or greater, it needs to be on file within the past 3 months.  If less than 8, must be on file within the past 6 months.     Recent Labs   Lab Test 05/03/19  0645   A1C 8.9*             Passed - Blood pressure less than 140/90 in past 6 months     BP Readings from Last 3 Encounters:   05/14/19 124/74   12/11/18 120/72   11/08/18 126/70                 Passed - LDL on file in past 12 months     Recent Labs   Lab Test 12/11/18  1005   LDL 49             Passed - Microalbumin on file in past 12 months     Recent Labs   Lab Test 05/14/19  0755   MICROL 49   UMALCR 42.72*             Passed - Serum creatinine on file in past 12 months     Recent Labs   Lab Test 05/14/19  0801   CR 0.99             Passed - Medication is active on med list        Passed - Patient is age 18 or older        Passed - Recent (6 mo) or future (30 days) visit within the authorizing provider's specialty     Patient had office visit in the last 6 months or has a visit in the next 30 days with authorizing provider or within the authorizing provider's specialty.  See \"Patient Info\" tab in inbasket, or \"Choose Columns\" in Meds & Orders section of the refill encounter.              "

## 2019-09-09 NOTE — LETTER
[facility]  [address] [address]  Phone: [phone]       September 12, 2019         Orville Plasencia  [address] [address] [place] [id number]            Dear Orville:    We are concerned about your health care.  We recently provided you with medication refills.  Many medications require routine follow-up with your doctor.    Your prescription(s) have been refilled for 30 days so you may have time for the above noted follow-up. Please call to schedule soon so we can assure you have an appointment before your next refills are needed.    Thank you,      Anahi Sky NP / casisa     done

## 2019-09-12 RX ORDER — INSULIN GLARGINE 100 [IU]/ML
23 INJECTION, SOLUTION SUBCUTANEOUS 2 TIMES DAILY
Qty: 15 ML | Refills: 0 | Status: SHIPPED | OUTPATIENT
Start: 2019-09-12 | End: 2020-01-09

## 2019-12-27 DIAGNOSIS — N40.0 BENIGN NON-NODULAR PROSTATIC HYPERPLASIA: ICD-10-CM

## 2019-12-27 RX ORDER — TAMSULOSIN HYDROCHLORIDE 0.4 MG/1
CAPSULE ORAL
Qty: 90 CAPSULE | Refills: 1 | Status: SHIPPED | OUTPATIENT
Start: 2019-12-27 | End: 2020-05-12

## 2019-12-27 NOTE — TELEPHONE ENCOUNTER
"Requested Prescriptions   Pending Prescriptions Disp Refills     tamsulosin (FLOMAX) 0.4 MG capsule [Pharmacy Med Name: Tamsulosin HCl 0.4 MG Oral Capsule]  0     Sig: TAKE 1 CAPSULE BY MOUTH ONCE DAILY       Alpha Blockers Passed - 12/27/2019  9:28 AM        Passed - Blood pressure under 140/90 in past 12 months     BP Readings from Last 3 Encounters:   05/14/19 124/74   12/11/18 120/72   11/08/18 126/70                 Passed - Recent (12 mo) or future (30 days) visit within the authorizing provider's specialty     Patient has had an office visit with the authorizing provider or a provider within the authorizing providers department within the previous 12 mos or has a future within next 30 days. See \"Patient Info\" tab in inbasket, or \"Choose Columns\" in Meds & Orders section of the refill encounter.              Passed - Patient does not have Tadalafil, Vardenafil, or Sildenafil on their medication list        Passed - Medication is active on med list        Passed - Patient is 18 years of age or older        Last Written Prescription Date:  12/11/2018  Last Fill Quantity: 90,  # refills: 3   Last office visit: 5/14/2019 with prescribing provider:  Asya   Future Office Visit:      "

## 2019-12-27 NOTE — TELEPHONE ENCOUNTER
Prescription approved per [facility] Refill Protocol.     Emily MAHMOOD BSN, RN

## 2019-12-28 DIAGNOSIS — Z79.4 TYPE 2 DIABETES MELLITUS WITH HYPERGLYCEMIA, WITH LONG-TERM CURRENT USE OF INSULIN (H): ICD-10-CM

## 2019-12-28 DIAGNOSIS — E11.65 TYPE 2 DIABETES MELLITUS WITH HYPERGLYCEMIA, WITH LONG-TERM CURRENT USE OF INSULIN (H): ICD-10-CM

## 2019-12-28 NOTE — LETTER
[facility]  [address] [facility] [address]  [phone]        December 30, 2019  Orville Rogerwayne Plasencia  [address] [address] [place] [id number]    Dear Orville,    I care about your health and have reviewed your health plan. I have reviewed your medical conditions, medication list, and lab results and am making recommendations based on this review, to better manage your health.    You are in particular need of attention regarding:  -Diabetes    I am recommending that you:  -schedule a FOLLOWUP OFFICE APPOINTMENT with me.  I will recheck your: A1c, Lipids (fasting cholesterol - nothing to eat except water and/or meds for 8+ hours) and BMP (basic metabolic panel) tests.    Please call us at [phone] (or use [employer]) to address the above recommendations.     Thank you for trusting [facility] and we appreciate the opportunity to serve you.  We look forward to supporting your healthcare needs in the future.    Healthy Regards,    JAHAIRA Perdue CNP/Gabriela STEVE RN

## 2019-12-30 NOTE — TELEPHONE ENCOUNTER
"Requested Prescriptions   Pending Prescriptions Disp Refills     insulin aspart (NOVOLOG FLEXPEN) 100 UNIT/ML pen [Pharmacy Med Name: NovoLOG FlexPen 100 UNIT/ML Subcutaneous Solution Pen-injector] 30 mL 0     Sig: INJECT 7 UNITS OF NOVOLOG ALONG WITH USING SLIDING SCALE (MAX TOTAL DOSE OF 13 UNITS EACH INJECTION) BREAKFAST, LUNCH AND DINNER   Last Written Prescription Date:  12/11/18  Last Fill Quantity: 30ml,  # refills: 3   Last office visit: 5/14/2019 with prescribing provider:  Anahi Sky     Future Office Visit:        Short Acting Insulin Protocol Failed - 12/28/2019  5:30 AM        Failed - Blood pressure less than 140/90 in past 6 months     BP Readings from Last 3 Encounters:   05/14/19 124/74   12/11/18 120/72   11/08/18 126/70                 Failed - LDL on file in past 12 months     Recent Labs   Lab Test 12/11/18  1005   LDL 49             Failed - HgbA1C in past 3 or 6 months     If HgbA1C is 8 or greater, it needs to be on file within the past 3 months.  If less than 8, must be on file within the past 6 months.     Recent Labs   Lab Test 05/03/19  0645   A1C 8.9*             Failed - Recent (6 mo) or future (30 days) visit within the authorizing provider's specialty     Patient had office visit in the last 6 months or has a visit in the next 30 days with authorizing provider or within the authorizing provider's specialty.  See \"Patient Info\" tab in inbasket, or \"Choose Columns\" in Meds & Orders section of the refill encounter.            Passed - Microalbumin on file in past 12 months     Recent Labs   Lab Test 05/14/19  0755   MICROL 49   UMALCR 42.72*             Passed - Serum creatinine on file in past 12 months     Recent Labs   Lab Test 05/14/19  0801   CR 0.99             Passed - Medication is active on med list        Passed - Patient is age 18 or older          "

## 2019-12-30 NOTE — TELEPHONE ENCOUNTER
Medication is being filled for 1 time refill only due to:  Patient needs to be seen because overdue for diabetes appt and labs..Letter mailed to pt today.    Gabriela STEVE RN

## 2020-01-07 DIAGNOSIS — E11.65 TYPE 2 DIABETES MELLITUS WITH HYPERGLYCEMIA, WITH LONG-TERM CURRENT USE OF INSULIN (H): ICD-10-CM

## 2020-01-07 DIAGNOSIS — Z79.4 TYPE 2 DIABETES MELLITUS WITH HYPERGLYCEMIA, WITH LONG-TERM CURRENT USE OF INSULIN (H): ICD-10-CM

## 2020-01-07 DIAGNOSIS — E78.5 HYPERLIPIDEMIA LDL GOAL <100: Primary | ICD-10-CM

## 2020-01-07 LAB
ALT SERPL W P-5'-P-CCNC: 57 U/L (ref 0–70)
CHOLEST SERPL-MCNC: 116 MG/DL
HBA1C MFR BLD: 9.2 % (ref 0–5.6)
HDLC SERPL-MCNC: 46 MG/DL
LDLC SERPL CALC-MCNC: 51 MG/DL
NONHDLC SERPL-MCNC: 70 MG/DL
TRIGL SERPL-MCNC: 95 MG/DL

## 2020-01-07 PROCEDURE — 83036 HEMOGLOBIN GLYCOSYLATED A1C: CPT | Performed by: NURSE PRACTITIONER

## 2020-01-07 PROCEDURE — 80061 LIPID PANEL: CPT | Performed by: NURSE PRACTITIONER

## 2020-01-07 PROCEDURE — 36415 COLL VENOUS BLD VENIPUNCTURE: CPT | Performed by: NURSE PRACTITIONER

## 2020-01-07 PROCEDURE — 84460 ALANINE AMINO (ALT) (SGPT): CPT | Performed by: NURSE PRACTITIONER

## 2020-01-09 ENCOUNTER — OFFICE VISIT (OUTPATIENT)
Dept: FAMILY MEDICINE | Facility: CLINIC | Age: 75
End: 2020-01-09
Payer: MEDICARE

## 2020-01-09 VITALS
SYSTOLIC BLOOD PRESSURE: 138 MMHG | RESPIRATION RATE: 16 BRPM | BODY MASS INDEX: 38.4 KG/M2 | TEMPERATURE: 98.3 F | OXYGEN SATURATION: 98 % | DIASTOLIC BLOOD PRESSURE: 70 MMHG | WEIGHT: 253.4 LBS | HEIGHT: 68 IN | HEART RATE: 68 BPM

## 2020-01-09 DIAGNOSIS — E66.01 MORBID OBESITY DUE TO EXCESS CALORIES (H): ICD-10-CM

## 2020-01-09 DIAGNOSIS — Z23 NEED FOR PROPHYLACTIC VACCINATION AND INOCULATION AGAINST INFLUENZA: Primary | ICD-10-CM

## 2020-01-09 DIAGNOSIS — Z79.4 TYPE 2 DIABETES MELLITUS WITH HYPERGLYCEMIA, WITH LONG-TERM CURRENT USE OF INSULIN (H): ICD-10-CM

## 2020-01-09 DIAGNOSIS — E11.65 TYPE 2 DIABETES MELLITUS WITH HYPERGLYCEMIA, WITH LONG-TERM CURRENT USE OF INSULIN (H): ICD-10-CM

## 2020-01-09 PROCEDURE — G0008 ADMIN INFLUENZA VIRUS VAC: HCPCS | Performed by: NURSE PRACTITIONER

## 2020-01-09 PROCEDURE — 90662 IIV NO PRSV INCREASED AG IM: CPT | Performed by: NURSE PRACTITIONER

## 2020-01-09 PROCEDURE — 99214 OFFICE O/P EST MOD 30 MIN: CPT | Mod: 25 | Performed by: NURSE PRACTITIONER

## 2020-01-09 RX ORDER — INSULIN GLARGINE 100 [IU]/ML
26 INJECTION, SOLUTION SUBCUTANEOUS 2 TIMES DAILY
Qty: 15 ML | Refills: 0 | Status: SHIPPED | OUTPATIENT
Start: 2020-01-09 | End: 2020-05-05

## 2020-01-09 ASSESSMENT — MIFFLIN-ST. JEOR: SCORE: 1850.97

## 2020-01-09 NOTE — PROGRESS NOTES
Subjective     Orville Plasencia is a 74 year old male who presents to clinic today for the following health issues:    HPI   Diabetes Follow-up    How often are you checking your blood sugar? Three times daily  Blood sugar testing frequency justification:  Uncontrolled diabetes  What time of day are you checking your blood sugars (select all that apply)?  Before meals  Have you had any blood sugars above 200?  Yes pretty often  Have you had any blood sugars below 70?  Yes very seldom    What symptoms do you notice when your blood sugar is low?  None    What concerns do you have today about your diabetes? Blood sugar is often over 200     Do you have any of these symptoms? (Select all that apply)  No numbness or tingling in feet.  No redness, sores or blisters on feet.  No complaints of excessive thirst.  No reports of blurry vision.  No significant changes to weight.     Have you had a diabetic eye exam in the last 12 months? Yes- Date of last eye exam: yesterday 1/8/2020    BP Readings from Last 2 Encounters:   01/09/20 138/70   05/14/19 124/74     Hemoglobin A1C (%)   Date Value   01/07/2020 9.2 (H)   05/03/2019 8.9 (H)     LDL Cholesterol Calculated (mg/dL)   Date Value   01/07/2020 51   12/11/2018 49       Diabetes Management Resources      How many servings of fruits and vegetables do you eat daily?  4 or more    On average, how many sweetened beverages do you drink each day (Examples: soda, juice, sweet tea, etc.  Do NOT count diet or artificially sweetened beverages)?   0    How many days per week do you miss taking your medication? 0    -------------------------------------    Patient Active Problem List   Diagnosis     Sensorineural hearing loss     RADHA (obstructive sleep apnea) with REM related hypoventilation-Moderate (AHI 26)     Advanced directives, counseling/discussion     GERD (gastroesophageal reflux disease)     Generalized weakness     Anasarca     SVT (supraventricular tachycardia) (H)      Necrotizing pancreatitis     Atrial fibrillation (H)     Anemia due to blood loss, acute     Health Care Home     Pseudoaneurysm (H)     Diastolic heart failure (H)     Chronic pancreatitis (H)     Pancreatitis, gallstone     Type 2 diabetes mellitus with hyperglycemia (H)     Morbid obesity due to excess calories (H)     Herpes zoster without complication     Hyperlipidemia LDL goal <70     Type 2 diabetes mellitus with microalbuminuria, with long-term current use of insulin (H)     Past Surgical History:   Procedure Laterality Date     COLONOSCOPY N/A 11/8/2018    Procedure: colonoscopy;  Surgeon: Adrian Chowdary MD;  Location: [facility]     ENDOSCOPIC INSERTION TUBE GASTROSTOMY  9/20/2013    Procedure: ENDOSCOPIC INSERTION TUBE GASTROSTOMY;  Exchange of Gastrojejunostomy Tube , dilation of cyst gastrostomy, endoscopic pancreatocolengiogram with dilation of cyst gastrostomy and stent removal, exchange of cyst duodenostomy stent;  Surgeon: Negro Munguia MD;  [facility]     ENDOSCOPIC INSERTION TUBE JEJUNOSTOMY  8/19/2013    Procedure: ENDOSCOPIC INSERTION TUBE JEJUNOSTOMY;;  Surgeon: Drew Palacios MD;  [facility]     ENDOSCOPIC RETROGRADE CHOLANGIOPANCREATOGRAM  8/19/2013    Procedure: ENDOSCOPIC RETROGRADE CHOLANGIOPANCREATOGRAM;;  Surgeon: Drew Palacios MD;  [facility]     ENDOSCOPIC RETROGRADE CHOLANGIOPANCREATOGRAM  9/12/2013    Procedure: ENDOSCOPIC RETROGRADE CHOLANGIOPANCREATOGRAM;  Endoscopic Retrograde Cholangiopancreatogram with necrosectomy, dilation of wall of necrosis, stent removal x 4 and gastro-jejunal tube placement.;  Surgeon: Negro Munguia MD;  [facility]     ENDOSCOPIC RETROGRADE CHOLANGIOPANCREATOGRAM  10/7/2013    Procedure: ENDOSCOPIC RETROGRADE CHOLANGIOPANCREATOGRAM;  endoscopic retrograde cholangiopancreatogram, cyst gastrostomy stent exchange, necrosectomy.;  Surgeon: Drew Palacios MD;  [facility]     ENDOSCOPIC RETROGRADE CHOLANGIOPANCREATOGRAM   11/18/2013    Procedure: ENDOSCOPIC RETROGRADE CHOLANGIOPANCREATOGRAM;  endoscopic retrograde cholangiopancreatogram with necrosectomy;  Surgeon: Drew Palacios MD;  [facility]     ENDOSCOPIC RETROGRADE CHOLANGIOPANCREATOGRAM  3/12/2014    Procedure: ENDOSCOPIC RETROGRADE CHOLANGIOPANCREATOGRAM;  Endoscopic Retrograde Cholangiopancreatogram with Stent Placement in Cyst Gastrostomy;  Surgeon: Winter Bowden MD;  [facility]     ENDOSCOPIC RETROGRADE CHOLANGIOPANCREATOGRAPHY, LITHOTRIPSY PANCREAS, COMBINED  9/19/2013    Procedure: COMBINED ENDOSCOPIC RETROGRADE CHOLANGIOPANCREATOGRAPHY, LITHOTRIPSY PANCREAS;  Endoscopic Retrograde Cholangiopancreatogram, placement of 2 stents in cyst gastrostomy;  Surgeon: Drew Palacios MD;  [facility]     ENDOSCOPIC ULTRASOUND UPPER GASTROINTESTINAL TRACT (GI)  7/22/2013    Procedure: ENDOSCOPIC ULTRASOUND UPPER GASTROINTESTINAL TRACT (GI);  Endoscopic Ultrasound;  Surgeon: Drew Palacios MD;  [facility]     ENDOSCOPIC ULTRASOUND UPPER GASTROINTESTINAL TRACT (GI)  8/19/2013    Procedure: ENDOSCOPIC ULTRASOUND UPPER GASTROINTESTINAL TRACT (GI);  Endoscopic ultrasound with fine needle aspiration,  Endorscopic retrograde cholangiopancreatogram with cystgastrostomy,       ENDOSCOPIC ULTRASOUND UPPER GASTROINTESTINAL TRACT (GI)  9/19/2013    Procedure: ENDOSCOPIC ULTRASOUND UPPER GASTROINTESTINAL TRACT (GI);  Endoscopic Ultrasound of Upper Gastrointestinal Tract;  Surgeon: Drew Palacios MD;  [facility]     ENDOSCOPIC ULTRASOUND UPPER GASTROINTESTINAL TRACT (GI)  8/7/2014    Procedure: ENDOSCOPIC ULTRASOUND, ESOPHAGOSCOPY / UPPER GASTROINTESTINAL TRACT (GI);  Surgeon: Adrian Plaza MD;  [facility]     ESOPHAGOSCOPY, GASTROSCOPY, DUODENOSCOPY (EGD), COMBINED  12/17/2013    Procedure: COMBINED ESOPHAGOSCOPY, GASTROSCOPY, DUODENOSCOPY (EGD);  EGD with stent removal and GJ tube replacement;  Surgeon: Negro Munguia MD;  [facility]      "ESOPHAGOSCOPY, GASTROSCOPY, DUODENOSCOPY (EGD), COMBINED  12/14/2013    Procedure: COMBINED ESOPHAGOSCOPY, GASTROSCOPY, DUODENOSCOPY (EGD);;  Surgeon: Winter Bowden MD;  [facility]     ESOPHAGOSCOPY, GASTROSCOPY, DUODENOSCOPY (EGD), COMBINED  7/29/2014    Procedure: COMBINED ESOPHAGOSCOPY, GASTROSCOPY, DUODENOSCOPY (EGD);  Surgeon: Negro Munguia MD;  [facility]     GASTROSTOMY TUBE       HC COLONOSCOPY THRU STOMA, DIAGNOSTIC  2007    Normal     LAPAROSCOPIC CHOLECYSTECTOMY  7/29/2014    Procedure: LAPAROSCOPIC CHOLECYSTECTOMY;  Surgeon: Olga Bruner MD;  [facility]     PICC INSERTION  12/20/2013    5fr DL Power PICC, 41cm (1cm external) in the R basilic vein w/ tip in the  mid SVC.       Social History     Tobacco Use     Smoking status: Never Smoker     Smokeless tobacco: Never Used   Substance Use Topics     Alcohol use: Yes     Comment: rare     Family History   Problem Relation Age of Onset     Diabetes Mother      Eye Disorder Mother         blind from Diab.     Heart Disease Mother      Obesity Mother      Alcohol/Drug Father      Diabetes Maternal Grandmother      Eye Disorder Brother         Macular Degeneration     Cancer Sister         Brain cancer     C.A.D. Brother         2 bothers 1 sister in 60s     Cancer - colorectal No family hx of            -------------------------------------  Reviewed and updated as needed this visit by Provider         Review of Systems   ROS COMP: Constitutional, HEENT, cardiovascular, pulmonary, GI, , musculoskeletal, neuro, skin, endocrine and psych systems are negative, except as otherwise noted.      Objective    /70   Pulse 68   Temp 98.3  F (36.8  C) (Tympanic)   Resp 16   Ht 1.715 m (5' 7.5\")   Wt 114.9 kg (253 lb 6.4 oz)   SpO2 98%   BMI 39.10 kg/m    Body mass index is 39.1 kg/m .  Physical Exam   GENERAL: alert, no distress and over weight  RESP: lungs clear to auscultation - no rales, rhonchi or wheezes  CV: " "regular rate and rhythm, normal S1 S2, no S3 or S4, no murmur, click or rub, no peripheral edema and peripheral pulses strong  MS: no gross musculoskeletal defects noted, no edema    Diagnostic Test Results:  Labs reviewed in Epic        Assessment & Plan     1. Type 2 diabetes mellitus with hyperglycemia, with long-term current use of insulin (H)  Uncontrolled- patient admits to overeating. Recommend exercise and better diet control- will increase insulin by 3 units bid. Consult with diabetic ed if no improvement in 3 months   - insulin aspart (NOVOLOG FLEXPEN) 100 UNIT/ML pen; INJECT 7 UNITS OF NOVOLOG ALONG WITH USING SLIDING SCALE (MAX TOTAL DOSE OF 13 UNITS EACH INJECTION) BREAKFAST, LUNCH AND DINNER  Dispense: 9 mL; Refill: 0  - insulin glargine (BASAGLAR KWIKPEN) 100 UNIT/ML pen; Inject 26 Units Subcutaneous 2 times daily  Dispense: 15 mL; Refill: 0  - Hemoglobin A1c; Future    2. Morbid obesity due to excess calories (H)  Counseled on diet and exercise- recommend patient lose weight/ reduce daily calories.     3. Need for prophylactic vaccination and inoculation against influenza    - INFLUENZA (HIGH DOSE) 3 VALENT VACCINE [51369]  - ADMIN INFLUENZA (For MEDICARE Patients ONLY) []     BMI:   Estimated body mass index is 39.1 kg/m  as calculated from the following:    Height as of this encounter: 1.715 m (5' 7.5\").    Weight as of this encounter: 114.9 kg (253 lb 6.4 oz).   Weight management plan: Discussed healthy diet and exercise guidelines        Return in about 3 months (around 4/9/2020).    JAHAIRA Perdue [facility]      "

## 2020-01-09 NOTE — PATIENT INSTRUCTIONS
Thank you for choosing [facility].  You may be receiving an email and/or telephone survey request from [facility] Customer Experience regarding your visit today.  Please take a few minutes to respond to the survey to let us know how we are doing.      If you have questions or concerns, please contact us via [employer] or you can contact your care team at [phone].    Our Clinic hours are:  Monday 6:40 am  to 7:00 pm  Tuesday -Friday 6:40 am to 5:00 pm    The [place] outpatient lab hours are:  Monday - Friday 6:10 am to 4:45 pm  Saturdays 7:00 am to 11:00 am  Appointments are required, call [phone]    If you have clinical questions after hours or would like to schedule an appointment,  call the clinic at [phone].

## 2020-01-29 DIAGNOSIS — E11.51 TYPE 2 DIABETES MELLITUS WITH DIABETIC PERIPHERAL ANGIOPATHY WITHOUT GANGRENE (H): ICD-10-CM

## 2020-01-29 DIAGNOSIS — Z79.4 TYPE 2 DIABETES MELLITUS WITH HYPERGLYCEMIA, WITH LONG-TERM CURRENT USE OF INSULIN (H): ICD-10-CM

## 2020-01-29 DIAGNOSIS — E11.65 TYPE 2 DIABETES MELLITUS WITH HYPERGLYCEMIA, WITH LONG-TERM CURRENT USE OF INSULIN (H): ICD-10-CM

## 2020-01-30 NOTE — TELEPHONE ENCOUNTER
"Requested Prescriptions   Pending Prescriptions Disp Refills     ACCU-CHEK COMPACT PLUS test strip [Pharmacy Med Name: ACCU-CHEK COMPACT PLUS [place]]  0     Sig: USE 1 STRIP TO CHECK GLUCOSE 4 TIMES DAILY       Diabetic Supplies Protocol Passed - 1/29/2020  8:52 PM        Passed - Medication is active on med list        Passed - Patient is 18 years of age or older        Passed - Recent (6 mo) or future (30 days) visit within the authorizing provider's specialty     Patient had office visit in the last 6 months or has a visit in the next 30 days with authorizing provider.  See \"Patient Info\" tab in inbasket, or \"Choose Columns\" in Meds & Orders section of the refill encounter.       Last Written Prescription Date:  4/24/19  Last Fill Quantity: 408,  # refills: 1   Last office visit: 1/9/2020 with prescribing provider:  Asya   Future Office Visit:         RELION MINI PEN NEEDLES 31G X 6 MM miscellaneous [Pharmacy Med Name: RELION PEN NEEDLES [id number] MIS]  0     Sig: USE FOR ADMINISTERING INSULIN 5 TIMES DAILY OR AS DIRECTED       Diabetic Supplies Protocol Passed - 1/29/2020  8:52 PM        Passed - Medication is active on med list        Passed - Patient is 18 years of age or older        Passed - Recent (6 mo) or future (30 days) visit within the authorizing provider's specialty     Patient had office visit in the last 6 months or has a visit in the next 30 days with authorizing provider.  See \"Patient Info\" tab in inbasket, or \"Choose Columns\" in Meds & Orders section of the refill encounter.            Last Written Prescription Date:  5/29/19  Last Fill Quantity: 500,  # refills: 1   Last office visit: 1/9/2020 with prescribing provider:  Asya   Future Office Visit:      "

## 2020-01-31 RX ORDER — PEN NEEDLE, DIABETIC 31 G X1/4"
NEEDLE, DISPOSABLE MISCELLANEOUS
Qty: 100 EACH | Refills: 2 | Status: SHIPPED | OUTPATIENT
Start: 2020-01-31 | End: 2020-04-08

## 2020-01-31 NOTE — TELEPHONE ENCOUNTER
Prescriptions approved per [facility] Refill Protocol.    1/9/2020 OV notes: How often are you checking your blood sugar? Three times daily  Return in about 3 months (around 4/9/2020).    Gabriela STEVE RN

## 2020-02-14 ENCOUNTER — TELEPHONE (OUTPATIENT)
Dept: FAMILY MEDICINE | Facility: CLINIC | Age: 75
End: 2020-02-14

## 2020-02-14 DIAGNOSIS — Z79.4 TYPE 2 DIABETES MELLITUS WITH HYPERGLYCEMIA, WITH LONG-TERM CURRENT USE OF INSULIN (H): ICD-10-CM

## 2020-02-14 DIAGNOSIS — E11.65 TYPE 2 DIABETES MELLITUS WITH HYPERGLYCEMIA, WITH LONG-TERM CURRENT USE OF INSULIN (H): ICD-10-CM

## 2020-02-14 RX ORDER — BLOOD-GLUCOSE METER
EACH MISCELLANEOUS
Qty: 1 KIT | Refills: 0 | Status: CANCELLED | OUTPATIENT
Start: 2020-02-14

## 2020-02-14 NOTE — TELEPHONE ENCOUNTER
Reason for Call:  DME    Detailed comments: patients wife is calling and stating that her husbands, Glucose drum of strips are no longer. Patient will need a new GLucose Monitor and strips and lancets. He is out of strips so he needs them today. [facility] in [place] on [address] Please advise.    Phone Number Patient can be reached at: Home number on file [phone] (home)    Best Time: any    Can we leave a detailed message on this number? YES   Geneva Chan  [facility]        Call taken on 2/14/2020 at 1:58 PM by Geneva Lynn

## 2020-02-14 NOTE — TELEPHONE ENCOUNTER
Wife calls back again asking for prescription as old meter is discontinued by company. Sent new test kit, lancets and test strips per [facility] refill protocol.

## 2020-02-14 NOTE — TELEPHONE ENCOUNTER
Accucheck compact meter is no longer being used as they recalled them. We need new system . Tests 3 times per day. Also she wants a 3 month supply on the Novolog flexpen. Parisa Lan RN

## 2020-02-23 ENCOUNTER — HEALTH MAINTENANCE LETTER (OUTPATIENT)
Age: 75
End: 2020-02-23

## 2020-04-08 DIAGNOSIS — E11.65 TYPE 2 DIABETES MELLITUS WITH HYPERGLYCEMIA, WITH LONG-TERM CURRENT USE OF INSULIN (H): ICD-10-CM

## 2020-04-08 DIAGNOSIS — Z79.4 TYPE 2 DIABETES MELLITUS WITH HYPERGLYCEMIA, WITH LONG-TERM CURRENT USE OF INSULIN (H): ICD-10-CM

## 2020-04-08 RX ORDER — PEN NEEDLE, DIABETIC 32GX 5/32"
NEEDLE, DISPOSABLE MISCELLANEOUS
Qty: 100 EACH | Refills: 1 | Status: SHIPPED | OUTPATIENT
Start: 2020-04-08 | End: 2022-10-10

## 2020-05-03 DIAGNOSIS — Z79.4 TYPE 2 DIABETES MELLITUS WITH HYPERGLYCEMIA, WITH LONG-TERM CURRENT USE OF INSULIN (H): ICD-10-CM

## 2020-05-03 DIAGNOSIS — E11.65 TYPE 2 DIABETES MELLITUS WITH HYPERGLYCEMIA, WITH LONG-TERM CURRENT USE OF INSULIN (H): ICD-10-CM

## 2020-05-04 NOTE — TELEPHONE ENCOUNTER
"Requested Prescriptions   Pending Prescriptions Disp Refills     LANTUS SOLOSTAR 100 UNIT/ML soln [Pharmacy Med Name: Lantus SoloStar 100 UNIT/ML Subcutaneous Solution Pen-injector] 15 mL 0     Sig: INJECT 23 UNITS SUBCUTANEOUSLY TWICE DAILY       Long Acting Insulin Protocol Failed - 5/3/2020  6:30 AM        Failed - HgbA1C in past 3 or 6 months     If HgbA1C is 8 or greater, it needs to be on file within the past 3 months.  If less than 8, must be on file within the past 6 months.     Recent Labs   Lab Test 01/07/20  0723   A1C 9.2*             Passed - Serum creatinine on file in past 12 months     Recent Labs   Lab Test 05/14/19  0801   CR 0.99       Ok to refill medication if creatinine is low          Passed - Medication is active on med list        Passed - Patient is age 18 or older        Passed - Recent (6 mo) or future (30 days) visit within the authorizing provider's specialty     Patient had office visit in the last 6 months or has a visit in the next 30 days with authorizing provider or within the authorizing provider's specialty.  See \"Patient Info\" tab in inbasket, or \"Choose Columns\" in Meds & Orders section of the refill encounter.               insulin glargine (BASAGLAR KWIKPEN) 100 UNIT/ML pen   Last Written Prescription Date:  1/9/2020  Last Fill Quantity: 15ml,  # refills: 0   Last office visit: 1/9/2020 with prescribing provider:  Asya   Future Office Visit:            "

## 2020-05-05 RX ORDER — INSULIN GLARGINE 100 [IU]/ML
INJECTION, SOLUTION SUBCUTANEOUS
Qty: 15 ML | Refills: 0 | Status: SHIPPED | OUTPATIENT
Start: 2020-05-05 | End: 2020-05-11

## 2020-05-11 ENCOUNTER — TELEPHONE (OUTPATIENT)
Dept: FAMILY MEDICINE | Facility: CLINIC | Age: 75
End: 2020-05-11

## 2020-05-11 DIAGNOSIS — E11.65 TYPE 2 DIABETES MELLITUS WITH HYPERGLYCEMIA, WITH LONG-TERM CURRENT USE OF INSULIN (H): Primary | ICD-10-CM

## 2020-05-11 DIAGNOSIS — Z79.4 TYPE 2 DIABETES MELLITUS WITH HYPERGLYCEMIA, WITH LONG-TERM CURRENT USE OF INSULIN (H): Primary | ICD-10-CM

## 2020-05-11 DIAGNOSIS — N40.0 BENIGN NON-NODULAR PROSTATIC HYPERPLASIA: ICD-10-CM

## 2020-05-11 NOTE — TELEPHONE ENCOUNTER
"ACCU_CHEK control solution           Last Office Visit: 1/9/2020 with Asya   Future Office visit:       Routing refill request to provider for review/approval because:  Drug not active on patient's medication list      Requested Prescriptions   Pending Prescriptions Disp Refills     insulin aspart (NOVOLOG FLEXPEN) 100 UNIT/ML pen 9 mL 0     Sig: INJECT 7 UNITS OF NOVOLOG ALONG WITH USING SLIDING SCALE (MAX TOTAL DOSE OF 13 UNITS EACH INJECTION) BREAKFAST, LUNCH AND DINNER  Last Written Prescription Date:  1/9/2020  Last Fill Quantity: 9ml,  # refills: 0   Last office visit: 1/9/2020 with prescribing provider:  Asya   Future Office Visit:                 Short Acting Insulin Protocol Failed - 5/11/2020  1:08 PM        Failed - HgbA1C in past 3 or 6 months     If HgbA1C is 8 or greater, it needs to be on file within the past 3 months.  If less than 8, must be on file within the past 6 months.     Recent Labs   Lab Test 01/07/20  0723   A1C 9.2*             Passed - Serum creatinine on file in past 12 months     Recent Labs   Lab Test 05/14/19  0801   CR 0.99       Ok to refill medication if creatinine is low          Passed - Medication is active on med list        Passed - Patient is age 18 or older        Passed - Recent (6 mo) or future (30 days) visit within the authorizing provider's specialty     Patient had office visit in the last 6 months or has a visit in the next 30 days with authorizing provider or within the authorizing provider's specialty.  See \"Patient Info\" tab in inbasket, or \"Choose Columns\" in Meds & Orders section of the refill encounter.               insulin pen needle (RELION PEN NEEDLES) 31G X 6 MM miscellaneous 150 each 0     Sig: USE FOR ADMINISTERING INSULIN 5 TIMES DAILY OR AS DIRECTED  Last Written Prescription Date:  4/26/2019  Last Fill Quantity: 150,  # refills: 0   Last office visit: 1/9/2020 with prescribing provider:  Asya    Future Office Visit:                 Diabetic Supplies " "Protocol Passed - 5/11/2020  1:08 PM        Passed - Medication is active on med list        Passed - Patient is 18 years of age or older        Passed - Recent (6 mo) or future (30 days) visit within the authorizing provider's specialty     Patient had office visit in the last 6 months or has a visit in the next 30 days with authorizing provider.  See \"Patient Info\" tab in inbasket, or \"Choose Columns\" in Meds & Orders section of the refill encounter.               insulin glargine (LANTUS SOLOSTAR) 100 UNIT/ML pen  Pharmacy Transfer  15 mL 0       Long Acting Insulin Protocol Failed - 5/11/2020  1:08 PM        Failed - HgbA1C in past 3 or 6 months     If HgbA1C is 8 or greater, it needs to be on file within the past 3 months.  If less than 8, must be on file within the past 6 months.     Recent Labs   Lab Test 01/07/20  0723   A1C 9.2*             Passed - Serum creatinine on file in past 12 months     Recent Labs   Lab Test 05/14/19  0801   CR 0.99       Ok to refill medication if creatinine is low          Passed - Medication is active on med list        Passed - Patient is age 18 or older        Passed - Recent (6 mo) or future (30 days) visit within the authorizing provider's specialty     Patient had office visit in the last 6 months or has a visit in the next 30 days with authorizing provider or within the authorizing provider's specialty.  See \"Patient Info\" tab in inbasket, or \"Choose Columns\" in Meds & Orders section of the refill encounter.               tamsulosin (FLOMAX) 0.4 MG capsule 90 capsule 1     Sig: TAKE 1 CAPSULE BY MOUTH ONCE DAILY  Last Written Prescription Date:  12/27/2019  Last Fill Quantity: 90,  # refills: 1   Last office visit: 1/9/2020 with prescribing provider:  Asya   Future Office Visit:                 Alpha Blockers Passed - 5/11/2020  1:08 PM        Passed - Blood pressure under 140/90 in past 12 months     BP Readings from Last 3 Encounters:   01/09/20 138/70   05/14/19 124/74 " "  12/11/18 120/72                 Passed - Recent (12 mo) or future (30 days) visit within the authorizing provider's specialty     Patient has had an office visit with the authorizing provider or a provider within the authorizing providers department within the previous 12 mos or has a future within next 30 days. See \"Patient Info\" tab in inbasket, or \"Choose Columns\" in Meds & Orders section of the refill encounter.              Passed - Patient does not have Tadalafil, Vardenafil, or Sildenafil on their medication list        Passed - Medication is active on med list        Passed - Patient is 18 years of age or older           blood glucose (NO BRAND SPECIFIED) lancets standard 100 each 4     Sig: Use to test blood sugar three times daily.  Pharmacy Transfer       There is no refill protocol information for this order        blood glucose (NO BRAND SPECIFIED) test strip 100 each 4     Sig: Use to test blood sugar 3 times daily or as directed.  Pharmacy Transfer       Diabetic Supplies Protocol Passed - 5/11/2020  1:08 PM        Passed - Medication is active on med list        Passed - Patient is 18 years of age or older        Passed - Recent (6 mo) or future (30 days) visit within the authorizing provider's specialty     Patient had office visit in the last 6 months or has a visit in the next 30 days with authorizing provider.  See \"Patient Info\" tab in inbasket, or \"Choose Columns\" in Meds & Orders section of the refill encounter.               blood glucose monitoring (NO BRAND SPECIFIED) meter device kit 1 kit 0     Sig: Use to test blood sugar 3 times daily.  Pharmacy Transfer       Diabetic Supplies Protocol Passed - 5/11/2020  1:08 PM        Passed - Medication is active on med list        Passed - Patient is 18 years of age or older        Passed - Recent (6 mo) or future (30 days) visit within the authorizing provider's specialty     Patient had office visit in the last 6 months or has a visit in the " "next 30 days with authorizing provider.  See \"Patient Info\" tab in inbasket, or \"Choose Columns\" in Meds & Orders section of the refill encounter.                 "

## 2020-05-11 NOTE — LETTER
[facility]  [address] [address]  Phone: [phone]       May 12, 2020         Orville Plasencia  [address] [address] [place] [id number]            Dear Orville:    We are concerned about your health care.  We recently provided you with medication refills.  Many medications require routine follow-up with your doctor.    Your prescription(s) have been refilled for 30 days so you may have time for the above noted follow-up. Please call to schedule soon so we can assure you have an appointment before your next refills are needed.    Thank you,      Anahi Sky NP / Emelia ROBERT RN

## 2020-05-12 RX ORDER — INSULIN ASPART 100 [IU]/ML
INJECTION, SOLUTION INTRAVENOUS; SUBCUTANEOUS
Qty: 9 ML | Refills: 0 | Status: SHIPPED | OUTPATIENT
Start: 2020-05-12 | End: 2020-05-13

## 2020-05-12 RX ORDER — TAMSULOSIN HYDROCHLORIDE 0.4 MG/1
CAPSULE ORAL
Qty: 90 CAPSULE | Refills: 1 | Status: SHIPPED | OUTPATIENT
Start: 2020-05-12 | End: 2020-06-23

## 2020-05-12 RX ORDER — PEN NEEDLE, DIABETIC 32GX 5/32"
NEEDLE, DISPOSABLE MISCELLANEOUS
Qty: 150 EACH | Refills: 0 | Status: SHIPPED | OUTPATIENT
Start: 2020-05-12 | End: 2020-06-11

## 2020-05-12 NOTE — TELEPHONE ENCOUNTER
Medications refilled.  Please have patient come in for labs that are pending to get A1C and BMP.  I also recommend that he make a virtual visit with Anahi Sky to check in on diabetes.  Juliana Dhillon NP on 5/12/2020 at 8:15 AM

## 2020-05-13 ENCOUNTER — TELEPHONE (OUTPATIENT)
Dept: FAMILY MEDICINE | Facility: CLINIC | Age: 75
End: 2020-05-13

## 2020-05-13 DIAGNOSIS — E11.65 TYPE 2 DIABETES MELLITUS WITH HYPERGLYCEMIA, WITH LONG-TERM CURRENT USE OF INSULIN (H): ICD-10-CM

## 2020-05-13 DIAGNOSIS — Z79.4 TYPE 2 DIABETES MELLITUS WITH HYPERGLYCEMIA, WITH LONG-TERM CURRENT USE OF INSULIN (H): ICD-10-CM

## 2020-05-13 RX ORDER — INSULIN ASPART 100 [IU]/ML
INJECTION, SOLUTION INTRAVENOUS; SUBCUTANEOUS
Qty: 15 ML | Refills: 0 | Status: SHIPPED | OUTPATIENT
Start: 2020-05-13 | End: 2020-07-07

## 2020-05-13 NOTE — TELEPHONE ENCOUNTER
[facility]     Message: Novolog U 100 Flexpen Inj (5x3ML+15 ML)   With a quantity written of #9 ML (3 pens). This medication is packaged as 15 ml (5 pens) and cannot be broken Please clarify the quantity to dispense.

## 2020-05-19 RX ORDER — BLOOD-GLUCOSE CONTROL, NORMAL
EACH MISCELLANEOUS PRN
Qty: 1 BOTTLE | Refills: 0 | Status: SHIPPED | OUTPATIENT
Start: 2020-05-19 | End: 2022-02-10

## 2020-05-19 NOTE — TELEPHONE ENCOUNTER
[facility] has not received rx for AccuChek control solution or the FastClix drum.The other 7 medications were received. Geneva Morley on 5/19/2020 at 4:33 PM

## 2020-06-02 DIAGNOSIS — Z79.4 TYPE 2 DIABETES MELLITUS WITH HYPERGLYCEMIA, WITH LONG-TERM CURRENT USE OF INSULIN (H): ICD-10-CM

## 2020-06-02 DIAGNOSIS — E11.65 TYPE 2 DIABETES MELLITUS WITH HYPERGLYCEMIA, WITH LONG-TERM CURRENT USE OF INSULIN (H): ICD-10-CM

## 2020-06-02 DIAGNOSIS — E78.5 HYPERLIPIDEMIA LDL GOAL <100: ICD-10-CM

## 2020-06-02 LAB
ANION GAP SERPL CALCULATED.3IONS-SCNC: 3 MMOL/L (ref 3–14)
BUN SERPL-MCNC: 30 MG/DL (ref 7–30)
CALCIUM SERPL-MCNC: 9.5 MG/DL (ref 8.5–10.1)
CHLORIDE SERPL-SCNC: 106 MMOL/L (ref 94–109)
CO2 SERPL-SCNC: 28 MMOL/L (ref 20–32)
CREAT SERPL-MCNC: 1.07 MG/DL (ref 0.66–1.25)
GFR SERPL CREATININE-BSD FRML MDRD: 67 ML/MIN/{1.73_M2}
GLUCOSE SERPL-MCNC: 63 MG/DL (ref 70–99)
HBA1C MFR BLD: 9.4 % (ref 0–5.6)
POTASSIUM SERPL-SCNC: 4.4 MMOL/L (ref 3.4–5.3)
SODIUM SERPL-SCNC: 137 MMOL/L (ref 133–144)

## 2020-06-02 PROCEDURE — 36415 COLL VENOUS BLD VENIPUNCTURE: CPT | Performed by: NURSE PRACTITIONER

## 2020-06-02 PROCEDURE — 80048 BASIC METABOLIC PNL TOTAL CA: CPT | Performed by: NURSE PRACTITIONER

## 2020-06-02 PROCEDURE — 83036 HEMOGLOBIN GLYCOSYLATED A1C: CPT | Performed by: NURSE PRACTITIONER

## 2020-06-10 DIAGNOSIS — E11.65 TYPE 2 DIABETES MELLITUS WITH HYPERGLYCEMIA, WITH LONG-TERM CURRENT USE OF INSULIN (H): ICD-10-CM

## 2020-06-10 DIAGNOSIS — Z79.4 TYPE 2 DIABETES MELLITUS WITH HYPERGLYCEMIA, WITH LONG-TERM CURRENT USE OF INSULIN (H): ICD-10-CM

## 2020-06-10 NOTE — TELEPHONE ENCOUNTER
Requested Prescriptions   Pending Prescriptions Disp Refills     insulin pen needle (RELION PEN NEEDLES) 31G X 6 MM miscellaneous 150 each 0     Sig: USE FOR ADMINISTERING INSULIN 5 TIMES DAILY OR AS DIRECTED       There is no refill protocol information for this order        Pt is requesting 90 days supply    Emelia Cornejo  [facility]

## 2020-06-11 RX ORDER — PEN NEEDLE, DIABETIC 32GX 5/32"
NEEDLE, DISPOSABLE MISCELLANEOUS
Qty: 150 EACH | Refills: 3 | Status: SHIPPED | OUTPATIENT
Start: 2020-06-11 | End: 2020-09-08

## 2020-06-11 NOTE — TELEPHONE ENCOUNTER
Prescription approved per [facility] Refill Protocol.  Sue NICHOLSON RN BSN

## 2020-06-23 ENCOUNTER — VIRTUAL VISIT (OUTPATIENT)
Dept: FAMILY MEDICINE | Facility: CLINIC | Age: 75
End: 2020-06-23
Payer: COMMERCIAL

## 2020-06-23 DIAGNOSIS — N40.0 BENIGN NON-NODULAR PROSTATIC HYPERPLASIA: ICD-10-CM

## 2020-06-23 DIAGNOSIS — Z79.4 TYPE 2 DIABETES MELLITUS WITH HYPERGLYCEMIA, WITH LONG-TERM CURRENT USE OF INSULIN (H): Primary | ICD-10-CM

## 2020-06-23 DIAGNOSIS — E11.65 TYPE 2 DIABETES MELLITUS WITH HYPERGLYCEMIA, WITH LONG-TERM CURRENT USE OF INSULIN (H): Primary | ICD-10-CM

## 2020-06-23 PROCEDURE — 99214 OFFICE O/P EST MOD 30 MIN: CPT | Mod: 95 | Performed by: NURSE PRACTITIONER

## 2020-06-23 RX ORDER — TAMSULOSIN HYDROCHLORIDE 0.4 MG/1
CAPSULE ORAL
Qty: 90 CAPSULE | Refills: 1 | Status: SHIPPED | OUTPATIENT
Start: 2020-06-23 | End: 2020-11-19

## 2020-06-23 NOTE — PROGRESS NOTES
"Orville Plasencia is a 75 year old male who is being evaluated via a billable telephone visit.      The patient has been notified of following:     \"This telephone visit will be conducted via a call between you and your physician/provider. We have found that certain health care needs can be provided without the need for a physical exam.  This service lets us provide the care you need with a short phone conversation.  If a prescription is necessary we can send it directly to your pharmacy.  If lab work is needed we can place an order for that and you can then stop by our lab to have the test done at a later time.    Telephone visits are billed at different rates depending on your insurance coverage. During this emergency period, for some insurers they may be billed the same as an in-person visit.  Please reach out to your insurance provider with any questions.    If during the course of the call the physician/provider feels a telephone visit is not appropriate, you will not be charged for this service.\"    Patient has given verbal consent for Telephone visit?  Yes    What phone number would you like to be contacted at? [phone]    How would you like to obtain your AVS? Mail a copy    Subjective     Orville Plasencia is a 75 year old male who presents via phone visit today for the following health issues:    HPI  Diabetes Follow-up    How often are you checking your blood sugar? Three times daily-   Blood sugar testing frequency justification:  Adjustment of medication(s)  What time of day are you checking your blood sugars (select all that apply)?  Before meals, After meals, Before and after meals and At bedtime  Have you had any blood sugars above 200?  Yes on occasion  Have you had any blood sugars below 70?  No    What symptoms do you notice when your blood sugar is low?  hunger    What concerns do you have today about your diabetes? None     Do you have any of these symptoms? (Select all that apply)  No " numbness or tingling in feet.  No redness, sores or blisters on feet.  No complaints of excessive thirst.  No reports of blurry vision.  No significant changes to weight.      BP Readings from Last 2 Encounters:   01/09/20 138/70   05/14/19 124/74     Hemoglobin A1C (%)   Date Value   06/02/2020 9.4 (H)   01/07/2020 9.2 (H)     LDL Cholesterol Calculated (mg/dL)   Date Value   01/07/2020 51   12/11/2018 49           How many servings of fruits and vegetables do you eat daily?  2-3    On average, how many sweetened beverages do you drink each day (Examples: soda, juice, sweet tea, etc.  Do NOT count diet or artificially sweetened beverages)?   0    How many days per week do you exercise enough to make your heart beat faster? 3 or less, in garden doing work    How many minutes a day do you exercise enough to make your heart beat faster?     How many days per week do you miss taking your medication? 0         C/O frequent urination for years, has not ever seen a urologist, but would like a referral. Reports weak stream, urinary incontinence . Patient is taking Flomax once daily.     Patient Active Problem List   Diagnosis     Sensorineural hearing loss     RADHA (obstructive sleep apnea) with REM related hypoventilation-Moderate (AHI 26)     Advanced directives, counseling/discussion     GERD (gastroesophageal reflux disease)     Generalized weakness     Anasarca     SVT (supraventricular tachycardia) (H)     Necrotizing pancreatitis     Atrial fibrillation (H)     Anemia due to blood loss, acute     Health Care Home     Pseudoaneurysm (H)     Diastolic heart failure (H)     Chronic pancreatitis (H)     Pancreatitis, gallstone     Type 2 diabetes mellitus with hyperglycemia (H)     Morbid obesity due to excess calories (H)     Herpes zoster without complication     Hyperlipidemia LDL goal <70     Type 2 diabetes mellitus with microalbuminuria, with long-term current use of insulin (H)     Past Surgical History:    Procedure Laterality Date     COLONOSCOPY N/A 11/8/2018    Procedure: colonoscopy;  Surgeon: Adrian Chowdary MD;  [facility]: [facility]     ENDOSCOPIC INSERTION TUBE GASTROSTOMY  9/20/2013    Procedure: ENDOSCOPIC INSERTION TUBE GASTROSTOMY;  Exchange of Gastrojejunostomy Tube , dilation of cyst gastrostomy, endoscopic pancreatocolengiogram with dilation of cyst gastrostomy and stent removal, exchange of cyst duodenostomy stent;  Surgeon: Negro Munguia MD;  [facility]     ENDOSCOPIC INSERTION TUBE JEJUNOSTOMY  8/19/2013    Procedure: ENDOSCOPIC INSERTION TUBE JEJUNOSTOMY;;  Surgeon: Drew Palacios MD;  [facility]     ENDOSCOPIC RETROGRADE CHOLANGIOPANCREATOGRAM  8/19/2013    Procedure: ENDOSCOPIC RETROGRADE CHOLANGIOPANCREATOGRAM;;  Surgeon: Drew Palacios MD;  [facility]     ENDOSCOPIC RETROGRADE CHOLANGIOPANCREATOGRAM  9/12/2013    Procedure: ENDOSCOPIC RETROGRADE CHOLANGIOPANCREATOGRAM;  Endoscopic Retrograde Cholangiopancreatogram with necrosectomy, dilation of wall of necrosis, stent removal x 4 and gastro-jejunal tube placement.;  Surgeon: Negro Munguia MD;  [facility]     ENDOSCOPIC RETROGRADE CHOLANGIOPANCREATOGRAM  10/7/2013    Procedure: ENDOSCOPIC RETROGRADE CHOLANGIOPANCREATOGRAM;  endoscopic retrograde cholangiopancreatogram, cyst gastrostomy stent exchange, necrosectomy.;  Surgeon: Drew Palacios MD;  [facility]     ENDOSCOPIC RETROGRADE CHOLANGIOPANCREATOGRAM  11/18/2013    Procedure: ENDOSCOPIC RETROGRADE CHOLANGIOPANCREATOGRAM;  endoscopic retrograde cholangiopancreatogram with necrosectomy;  Surgeon: Drew Palacios MD;  [facility]     ENDOSCOPIC RETROGRADE CHOLANGIOPANCREATOGRAM  3/12/2014    Procedure: ENDOSCOPIC RETROGRADE CHOLANGIOPANCREATOGRAM;  Endoscopic Retrograde Cholangiopancreatogram with Stent Placement in Cyst Gastrostomy;  Surgeon: Winter Bowden MD;  [facility]     ENDOSCOPIC RETROGRADE CHOLANGIOPANCREATOGRAPHY, LITHOTRIPSY  PANCREAS, COMBINED  9/19/2013    Procedure: COMBINED ENDOSCOPIC RETROGRADE CHOLANGIOPANCREATOGRAPHY, LITHOTRIPSY PANCREAS;  Endoscopic Retrograde Cholangiopancreatogram, placement of 2 stents in cyst gastrostomy;  Surgeon: Drew Palacios MD;  [facility]     ENDOSCOPIC ULTRASOUND UPPER GASTROINTESTINAL TRACT (GI)  7/22/2013    Procedure: ENDOSCOPIC ULTRASOUND UPPER GASTROINTESTINAL TRACT (GI);  Endoscopic Ultrasound;  Surgeon: Drew Palacios MD;  [facility]     ENDOSCOPIC ULTRASOUND UPPER GASTROINTESTINAL TRACT (GI)  8/19/2013    Procedure: ENDOSCOPIC ULTRASOUND UPPER GASTROINTESTINAL TRACT (GI);  Endoscopic ultrasound with fine needle aspiration,  Endorscopic retrograde cholangiopancreatogram with cystgastrostomy,       ENDOSCOPIC ULTRASOUND UPPER GASTROINTESTINAL TRACT (GI)  9/19/2013    Procedure: ENDOSCOPIC ULTRASOUND UPPER GASTROINTESTINAL TRACT (GI);  Endoscopic Ultrasound of Upper Gastrointestinal Tract;  Surgeon: Drew Palacios MD;  [facility]     ENDOSCOPIC ULTRASOUND UPPER GASTROINTESTINAL TRACT (GI)  8/7/2014    Procedure: ENDOSCOPIC ULTRASOUND, ESOPHAGOSCOPY / UPPER GASTROINTESTINAL TRACT (GI);  Surgeon: Adrian Plaza MD;  [facility]     ESOPHAGOSCOPY, GASTROSCOPY, DUODENOSCOPY (EGD), COMBINED  12/17/2013    Procedure: COMBINED ESOPHAGOSCOPY, GASTROSCOPY, DUODENOSCOPY (EGD);  EGD with stent removal and GJ tube replacement;  Surgeon: Negro Munguia MD;  [facility]     ESOPHAGOSCOPY, GASTROSCOPY, DUODENOSCOPY (EGD), COMBINED  12/14/2013    Procedure: COMBINED ESOPHAGOSCOPY, GASTROSCOPY, DUODENOSCOPY (EGD);;  Surgeon: Winter Bowden MD;  [facility]     ESOPHAGOSCOPY, GASTROSCOPY, DUODENOSCOPY (EGD), COMBINED  7/29/2014    Procedure: COMBINED ESOPHAGOSCOPY, GASTROSCOPY, DUODENOSCOPY (EGD);  Surgeon: Negro Munguia MD;  [facility]     GASTROSTOMY TUBE       HC COLONOSCOPY THRU STOMA, DIAGNOSTIC  2007    Normal     LAPAROSCOPIC CHOLECYSTECTOMY   7/29/2014    Procedure: LAPAROSCOPIC CHOLECYSTECTOMY;  Surgeon: Olga Bruner MD;  Location: [facility]     PICC INSERTION  12/20/2013    5fr DL Power PICC, 41cm (1cm external) in the R basilic vein w/ tip in the  mid SVC.       Social History     Tobacco Use     Smoking status: Never Smoker     Smokeless tobacco: Never Used   Substance Use Topics     Alcohol use: Yes     Comment: rare     Family History   Problem Relation Age of Onset     Diabetes Mother      Eye Disorder Mother         blind from Diab.     Heart Disease Mother      Obesity Mother      Alcohol/Drug Father      Diabetes Maternal Grandmother      Eye Disorder Brother         Macular Degeneration     Cancer Sister         Brain cancer     C.A.D. Brother         2 bothers 1 sister in 60s     Cancer - colorectal No family hx of            Reviewed and updated as needed this visit by Provider         Review of Systems   Constitutional, HEENT, cardiovascular, pulmonary, GI, , musculoskeletal, neuro, skin, endocrine and psych systems are negative, except as otherwise noted.       Objective   Reported vitals:  There were no vitals taken for this visit.   healthy, alert and no distress  PSYCH: Alert and oriented times 3; coherent speech, normal   rate and volume, able to articulate logical thoughts, able   to abstract reason, no tangential thoughts, no hallucinations   or delusions  His affect is normal  RESP: No cough, no audible wheezing, able to talk in full sentences  Remainder of exam unable to be completed due to telephone visits    Diagnostic Test Results:  Labs reviewed in Epic        Assessment/Plan:  1. Type 2 diabetes mellitus with hyperglycemia, with long-term current use of insulin (H)  Uncontrolled  Discussed ways to increase lantus by 3 units every 4-5 days until BS < 150.   - AMBULATORY ADULT DIABETES EDUCATOR REFERRAL; Future  - Hemoglobin A1c; Future    2. Benign non-nodular prostatic hyperplasia  uncontrolled  - tamsulosin (FLOMAX) 0.4  MG capsule; TAKE 2 CAPSULE BY MOUTH ONCE DAILY  Dispense: 90 capsule; Refill: 1- increase from 1 tablet daily to 2 tabs daily    No follow-ups on file.      Phone call duration:  6 minutes    JAHAIRA Perdue CNP

## 2020-07-07 ENCOUNTER — TELEPHONE (OUTPATIENT)
Dept: FAMILY MEDICINE | Facility: CLINIC | Age: 75
End: 2020-07-07

## 2020-07-07 DIAGNOSIS — Z79.4 TYPE 2 DIABETES MELLITUS WITH HYPERGLYCEMIA, WITH LONG-TERM CURRENT USE OF INSULIN (H): ICD-10-CM

## 2020-07-07 DIAGNOSIS — E11.65 TYPE 2 DIABETES MELLITUS WITH HYPERGLYCEMIA, WITH LONG-TERM CURRENT USE OF INSULIN (H): ICD-10-CM

## 2020-07-07 NOTE — TELEPHONE ENCOUNTER
"Refilled per protocol.   Notified wife he can check with the local pharm for something he requested, \"Ok, did you respond to [employer]?\"  She reports [employer] was faxing us refill request. I apologized and advised her we don't have anything from [employer] recently. \"Ok, I will call them, thanks, \"  Samia Bingham RNC    "

## 2020-07-07 NOTE — TELEPHONE ENCOUNTER
Reason for Call:  Medication or medication refill:    Do you use a [facility]?  Name of the pharmacy and phone number for the current request:  [facility]    Name of the medication requested: Lantus pens    Other request: Patient will be out of insulin tonight, patient needs some pens until they get pens from [employer]. Order needs to be in for [facility] [place] today, 7/7/2020 AND to [facility].    Can we leave a detailed message on this number? YES    Phone number patient can be reached at: Home number on file [phone] (home)    Best Time: Any    Call taken on 7/7/2020 at 8:11 AM by Geneva Morley    Last Written Prescription Date:  5/12//2020  Last Fill Quantity: 15 mL,  # refills: 0  Last office visit: 1/9/2020 with prescribing provider:  Alejo Botello Office Visit:

## 2020-07-07 NOTE — TELEPHONE ENCOUNTER
"[place] , wife called back, \"it is still over 150 and he has been at 24 so we are next to 27. \"     Provider covering for Keyonna, pharmacy needs clarification on the dose of lantus. He is currently on 24 and going up to 27 next (per Keyonna's last notes, increase by 3 every 4-5 days until glucose under 150 .   Thanks.   Samia Bingham RNC    "

## 2020-07-07 NOTE — TELEPHONE ENCOUNTER
"[id number], note regarding insulin glargine (LANTUS SOLOSTAR) 100 UNIT/ML pen :  There are 2 sets of directions . Which is correct? Please correct and resend. Thank you    \"Sig - Route: Inject 26 Units Subcutaneous 2 times daily INJECT 23 UNITS SUBCUTANEOUSLY TWICE DAILY - Subcutaneous\"  "

## 2020-07-07 NOTE — TELEPHONE ENCOUNTER
Left message for patient to call back about what dose is he currently using actually.         Will have to then route to provider covering for Keyonna, to please correct Rx and address.     Samia Bingham RNC      Previous note 6/23/20 states:   Assessment/Plan:  1. Type 2 diabetes mellitus with hyperglycemia, with long-term current use of insulin (H)  Uncontrolled  Discussed ways to increase lantus by 3 units every 4-5 days until BS < 150.   - AMBULATORY ADULT DIABETES EDUCATOR REFERRAL; Future  - Hemoglobin A1c; Future

## 2020-07-08 RX ORDER — INSULIN ASPART 100 [IU]/ML
INJECTION, SOLUTION INTRAVENOUS; SUBCUTANEOUS
Qty: 15 ML | Refills: 1 | Status: SHIPPED | OUTPATIENT
Start: 2020-07-08 | End: 2020-09-08

## 2020-07-21 ENCOUNTER — TELEPHONE (OUTPATIENT)
Dept: FAMILY MEDICINE | Facility: CLINIC | Age: 75
End: 2020-07-21

## 2020-07-21 NOTE — TELEPHONE ENCOUNTER
Diabetes Education Scheduling Outreach #1:    Call to patient to schedule. Spouse stated patient is doing fine. They want to wait to do his 3 month recheck or A1C and go from there.    Lynda Finley OnCall  Diabetes and Nutrition Scheduling

## 2020-09-01 DIAGNOSIS — E11.65 TYPE 2 DIABETES MELLITUS WITH HYPERGLYCEMIA, WITH LONG-TERM CURRENT USE OF INSULIN (H): ICD-10-CM

## 2020-09-01 DIAGNOSIS — Z79.4 TYPE 2 DIABETES MELLITUS WITH HYPERGLYCEMIA, WITH LONG-TERM CURRENT USE OF INSULIN (H): ICD-10-CM

## 2020-09-01 NOTE — TELEPHONE ENCOUNTER
Routing refill request to provider for review/approval because:  Labs out of range:    Lab Results   Component Value Date    A1C 9.4 06/02/2020    A1C 9.2 01/07/2020    A1C 8.9 05/03/2019    A1C 8.0 12/11/2018    A1C 8.0 07/24/2018       Breanne Ballard RN

## 2020-09-01 NOTE — TELEPHONE ENCOUNTER
Covering for primary/ordering provider    Short refill given, further refills if appropriate per primary provider.    Last diabetes check in June, should be seen every 3 months given uncontrolled a1c

## 2020-09-04 ENCOUNTER — TELEPHONE (OUTPATIENT)
Dept: FAMILY MEDICINE | Facility: CLINIC | Age: 75
End: 2020-09-04

## 2020-09-04 DIAGNOSIS — Z79.4 TYPE 2 DIABETES MELLITUS WITH HYPERGLYCEMIA, WITH LONG-TERM CURRENT USE OF INSULIN (H): ICD-10-CM

## 2020-09-04 DIAGNOSIS — E11.65 TYPE 2 DIABETES MELLITUS WITH HYPERGLYCEMIA, WITH LONG-TERM CURRENT USE OF INSULIN (H): ICD-10-CM

## 2020-09-04 NOTE — TELEPHONE ENCOUNTER
Pt needing temporary refill of lantus, pt is out and mail order has not come, pt has appt 9/8, no need to call patient unless there are questions

## 2020-09-08 ENCOUNTER — OFFICE VISIT (OUTPATIENT)
Dept: FAMILY MEDICINE | Facility: CLINIC | Age: 75
End: 2020-09-08
Payer: COMMERCIAL

## 2020-09-08 VITALS
HEART RATE: 65 BPM | BODY MASS INDEX: 39.56 KG/M2 | TEMPERATURE: 96.8 F | DIASTOLIC BLOOD PRESSURE: 88 MMHG | SYSTOLIC BLOOD PRESSURE: 132 MMHG | OXYGEN SATURATION: 99 % | WEIGHT: 261 LBS | RESPIRATION RATE: 14 BRPM | HEIGHT: 68 IN

## 2020-09-08 DIAGNOSIS — Z23 NEED FOR PROPHYLACTIC VACCINATION AND INOCULATION AGAINST INFLUENZA: ICD-10-CM

## 2020-09-08 DIAGNOSIS — Z79.4 TYPE 2 DIABETES MELLITUS WITH HYPERGLYCEMIA, WITH LONG-TERM CURRENT USE OF INSULIN (H): Primary | ICD-10-CM

## 2020-09-08 DIAGNOSIS — E11.65 TYPE 2 DIABETES MELLITUS WITH HYPERGLYCEMIA, WITH LONG-TERM CURRENT USE OF INSULIN (H): Primary | ICD-10-CM

## 2020-09-08 LAB
CREAT UR-MCNC: 52 MG/DL
HBA1C MFR BLD: 8.5 % (ref 0–5.6)
MICROALBUMIN UR-MCNC: 23 MG/L
MICROALBUMIN/CREAT UR: 44 MG/G CR (ref 0–17)

## 2020-09-08 PROCEDURE — 90662 IIV NO PRSV INCREASED AG IM: CPT | Performed by: NURSE PRACTITIONER

## 2020-09-08 PROCEDURE — 36415 COLL VENOUS BLD VENIPUNCTURE: CPT | Performed by: NURSE PRACTITIONER

## 2020-09-08 PROCEDURE — 99213 OFFICE O/P EST LOW 20 MIN: CPT | Performed by: NURSE PRACTITIONER

## 2020-09-08 PROCEDURE — G0008 ADMIN INFLUENZA VIRUS VAC: HCPCS | Performed by: NURSE PRACTITIONER

## 2020-09-08 PROCEDURE — 82043 UR ALBUMIN QUANTITATIVE: CPT | Performed by: NURSE PRACTITIONER

## 2020-09-08 PROCEDURE — 83036 HEMOGLOBIN GLYCOSYLATED A1C: CPT | Performed by: NURSE PRACTITIONER

## 2020-09-08 RX ORDER — INSULIN ASPART 100 [IU]/ML
INJECTION, SOLUTION INTRAVENOUS; SUBCUTANEOUS
Qty: 45 ML | Refills: 3 | Status: SHIPPED | OUTPATIENT
Start: 2020-09-08 | End: 2021-07-23

## 2020-09-08 RX ORDER — INSULIN ASPART 100 [IU]/ML
INJECTION, SOLUTION INTRAVENOUS; SUBCUTANEOUS
Qty: 15 ML | Refills: 1 | Status: CANCELLED | OUTPATIENT
Start: 2020-09-08

## 2020-09-08 RX ORDER — PEN NEEDLE, DIABETIC 32GX 5/32"
NEEDLE, DISPOSABLE MISCELLANEOUS
Qty: 150 EACH | Refills: 3 | Status: SHIPPED | OUTPATIENT
Start: 2020-09-08 | End: 2021-02-04

## 2020-09-08 ASSESSMENT — MIFFLIN-ST. JEOR: SCORE: 1880.45

## 2020-09-08 NOTE — PROGRESS NOTES
Subjective     Orville Plasencia is a 75 year old male who presents to clinic today for the following health issues:    HPI       Diabetes Follow-up    How often are you checking your blood sugar? Three times daily  Blood sugar testing frequency justification:  avg 150  What time of day are you checking your blood sugars (select all that apply)?  Before and after meals and At bedtime  Have you had any blood sugars above 200?  Yes occ  Have you had any blood sugars below 70?  No    What symptoms do you notice when your blood sugar is low?  None    What concerns do you have today about your diabetes? None     Do you have any of these symptoms? (Select all that apply)  No numbness or tingling in feet.  No redness, sores or blisters on feet.  No complaints of excessive thirst.  No reports of blurry vision.  No significant changes to weight.      BP Readings from Last 2 Encounters:   01/09/20 138/70   05/14/19 124/74     Hemoglobin A1C (%)   Date Value   06/02/2020 9.4 (H)   01/07/2020 9.2 (H)     LDL Cholesterol Calculated (mg/dL)   Date Value   01/07/2020 51   12/11/2018 49                 How many servings of fruits and vegetables do you eat daily?  4 or more    On average, how many sweetened beverages do you drink each day (Examples: soda, juice, sweet tea, etc.  Do NOT count diet or artificially sweetened beverages)?   0    How many days per week do you exercise enough to make your heart beat faster? 5    How many minutes a day do you exercise enough to make your heart beat faster? 20 - 29  How many days per week do you miss taking your medication? occ     What makes it hard for you to take your medications?  remembering to take    Medication Followup of Lantus and novalog     Taking Medication as prescribed: yes    Side Effects:  None    Medication Helping Symptoms:  yes       Review of Systems   Constitutional, HEENT, cardiovascular, pulmonary, GI, , musculoskeletal, neuro, skin, endocrine and psych systems are  "negative, except as otherwise noted.      Objective    There were no vitals taken for this visit.  There is no height or weight on file to calculate BMI.  Physical Exam   GENERAL: healthy, alert and no distress  RESP: lungs clear to auscultation - no rales, rhonchi or wheezes  CV: regular rate and rhythm, normal S1 S2, no S3 or S4, no murmur, click or rub, no peripheral edema and peripheral pulses strong  MS: no gross musculoskeletal defects noted, no edema          Assessment & Plan     Type 2 diabetes mellitus with hyperglycemia, with long-term current use of insulin (H)  A1C pending- patient to follow up with diabetic ed if A1C is uncontrolled- discussed diet and exercise- patient admits to overeating.   - Albumin Random Urine Quantitative with Creat Ratio  - Hemoglobin A1c  - blood glucose (NO BRAND SPECIFIED) test strip; Use to test blood sugar 3 times daily or as directed.  - blood glucose (NO BRAND SPECIFIED) lancets standard; Use to test blood sugar three times daily.  - insulin pen needle (RELION PEN NEEDLES) 31G X 6 MM miscellaneous; USE FOR ADMINISTERING INSULIN 5 TIMES DAILY OR AS DIRECTED  Will continue insulin as prescribed unless A1C is uncontrolled     BMI:   Estimated body mass index is 40.28 kg/m  as calculated from the following:    Height as of this encounter: 1.715 m (5' 7.5\").    Weight as of this encounter: 118.4 kg (261 lb).   Weight management plan: Discussed healthy diet and exercise guidelines        No follow-ups on file.    JAHAIRA Perdue [facility]    "

## 2020-09-08 NOTE — PATIENT INSTRUCTIONS
Thank you for choosing [facility].  You may be receiving an email and/or telephone survey request from [employer] [facility] [employer] regarding your visit today.  Please take a few minutes to respond to the survey to let us know how we are doing.      If you have questions or concerns, please contact us via [employer] or you can contact your care team at [phone].    Our Clinic hours are:  Monday 6:40 am  to 7:00 pm  Tuesday -Friday 6:40 am to 5:00 pm    The [place] outpatient lab hours are:  Monday - Friday 6:10 am to 4:45 pm  Saturdays 7:00 am to 11:00 am  Appointments are required, call [phone]    If you have clinical questions after hours or would like to schedule an appointment,  call the clinic at [phone].

## 2020-09-08 NOTE — LETTER
[facility]  [address] [address]  Phone: [phone]    09/11/20    Orville Plasencia  [address] [address] [place] [id number]      Orville,        We are writing to inform you of the results from your recent lab work, included is a copy of those results.    Component      Latest Ref Rng & Units 9/8/2020   Creatinine Urine      mg/dL 52   Albumin Urine mg/L      mg/L 23   Albumin Urine mg/g Cr      0 - 17 mg/g Cr 44.00 (H)   Hemoglobin A1C      0 - 5.6 % 8.5 (H)     The protein in your urine is stable. Controlling the diabetes mellitus will help control how much protein you have in your urine.   If you have any questions, please do not hesitate to call our office.        Sincerely,      JAHAIRA Perdue CNP

## 2020-11-17 DIAGNOSIS — N40.0 BENIGN NON-NODULAR PROSTATIC HYPERPLASIA: ICD-10-CM

## 2020-11-17 NOTE — TELEPHONE ENCOUNTER
"Requested Prescriptions   Pending Prescriptions Disp Refills     tamsulosin (FLOMAX) 0.4 MG capsule [Pharmacy Med Name: Tamsulosin HCl 0.4 MG Oral Capsule] 90 capsule 0     Sig: Take 1 capsule by mouth once daily       Alpha Blockers Passed - 11/17/2020  9:44 AM        Passed - Blood pressure under 140/90 in past 12 months     BP Readings from Last 3 Encounters:   09/08/20 132/88   01/09/20 138/70   05/14/19 124/74                 Passed - Recent (12 mo) or future (30 days) visit within the authorizing provider's specialty     Patient has had an office visit with the authorizing provider or a provider within the authorizing providers department within the previous 12 mos or has a future within next 30 days. See \"Patient Info\" tab in inbasket, or \"Choose Columns\" in Meds & Orders section of the refill encounter.              Passed - Patient does not have Tadalafil, Vardenafil, or Sildenafil on their medication list        Passed - Medication is active on med list        Passed - Patient is 18 years of age or older             "

## 2020-11-19 RX ORDER — TAMSULOSIN HYDROCHLORIDE 0.4 MG/1
CAPSULE ORAL
Qty: 90 CAPSULE | Refills: 1 | Status: SHIPPED | OUTPATIENT
Start: 2020-11-19 | End: 2021-01-07

## 2021-01-19 ENCOUNTER — APPOINTMENT (OUTPATIENT)
Dept: GENERAL RADIOLOGY | Facility: CLINIC | Age: 76
End: 2021-01-19
Attending: PHYSICIAN ASSISTANT
Payer: COMMERCIAL

## 2021-01-19 ENCOUNTER — HOSPITAL ENCOUNTER (EMERGENCY)
Facility: CLINIC | Age: 76
Discharge: HOME | End: 2021-01-19
Attending: PHYSICIAN ASSISTANT | Admitting: PHYSICIAN ASSISTANT
Payer: COMMERCIAL

## 2021-01-19 ENCOUNTER — NURSE TRIAGE (OUTPATIENT)
Dept: PEDIATRICS | Facility: CLINIC | Age: 76
End: 2021-01-19

## 2021-01-19 VITALS
DIASTOLIC BLOOD PRESSURE: 71 MMHG | TEMPERATURE: 97.2 F | WEIGHT: 260 LBS | RESPIRATION RATE: 18 BRPM | SYSTOLIC BLOOD PRESSURE: 117 MMHG | HEART RATE: 91 BPM | BODY MASS INDEX: 38.51 KG/M2 | HEIGHT: 69 IN | OXYGEN SATURATION: 96 %

## 2021-01-19 DIAGNOSIS — S22.49XA RIB FRACTURES: ICD-10-CM

## 2021-01-19 PROCEDURE — 71101 X-RAY EXAM UNILAT RIBS/CHEST: CPT | Mod: LT

## 2021-01-19 PROCEDURE — G0463 HOSPITAL OUTPT CLINIC VISIT: HCPCS | Mod: 25 | Performed by: PHYSICIAN ASSISTANT

## 2021-01-19 PROCEDURE — 99213 OFFICE O/P EST LOW 20 MIN: CPT | Performed by: PHYSICIAN ASSISTANT

## 2021-01-19 RX ORDER — LIDOCAINE 50 MG/G
1 PATCH TOPICAL EVERY 24 HOURS
Qty: 10 PATCH | Refills: 0 | Status: SHIPPED | OUTPATIENT
Start: 2021-01-19 | End: 2021-01-29

## 2021-01-19 ASSESSMENT — MIFFLIN-ST. JEOR: SCORE: 1899.73

## 2021-01-19 NOTE — ED PROVIDER NOTES
History     Chief Complaint   Patient presents with     Fall     pt fell on ice outside his home x 4 days ago.  landed on L side.  c/o L arm and L rib pain.  no LOC and no blood thinners     HPI  Orville Plasencia is a 75 year old male with past medical history significant for diabetes mellitus, chronic pancreatitis, atrial fibrillation, GERD, hyperlipidemia, obstructive sleep apnea who presents to the emergency department with concern over left-sided chest pain after a fall 3 days prior to arrival.  Patient slipped on the ice in his driveway falling on his left side.  He is unsure exactly how he landed however denies hitting his head during the injury.  He did not have any loss of consciousness.  He was able to crawl to a grassy area and arise on his own without assistance.  He denies any associated dizziness, lightheadedness, vision changes, chest pain, palpitations.  He does complain of some shortness of breath which has improved since onset.  He continues to complain of left-sided chest and back discomfort.  No other areas of significant injury.  He has been taking an unknown over-the-counter pain reliever thought to possibly be naproxen 2 tablets daily with some improvement.  He denies any history of blood thinner use.     Allergies:  Allergies   Allergen Reactions     Lipitor [Atorvastatin Calcium] Cramps     Leg cramps     Problem List:    Patient Active Problem List    Diagnosis Date Noted     Type 2 diabetes mellitus with microalbuminuria, with long-term current use of insulin (H) 09/11/2018     Priority: Medium     Hyperlipidemia LDL goal <70 02/23/2016     Priority: Medium     Morbid obesity due to excess calories (H) 02/18/2016     Priority: Medium     Herpes zoster without complication 02/18/2016     Priority: Medium     Type 2 diabetes mellitus with hyperglycemia (H) 10/27/2015     Priority: Medium     Pancreatitis, gallstone 07/29/2014     Priority: Medium     Chronic pancreatitis (H) 04/13/2014      Priority: Medium     Diastolic heart failure (H) 03/19/2014     Priority: Medium     Problem list name updated by automated process. Provider to review       Pseudoaneurysm (H) 02/27/2014     Priority: Medium     Health Care Home 01/08/2014     Priority: Medium       Status:  Accepted  Care Coordinator:  Coco Meadows    See Letters for [facility] Care Plan/Emergency Care Plan               Anemia due to blood loss, acute 10/08/2013     Priority: Medium     Atrial fibrillation (H) 09/17/2013     Priority: Medium     Necrotizing pancreatitis 08/24/2013     Priority: Medium     Generalized weakness 08/23/2013     Priority: Medium     Anasarca 08/23/2013     Priority: Medium     SVT (supraventricular tachycardia) (H) 08/23/2013     Priority: Medium     GERD (gastroesophageal reflux disease) 07/02/2013     Priority: Medium     Advanced directives, counseling/discussion 01/25/2012     Priority: Medium     Patient does not have an Advance/Health Care Directive (HCD), requests blank HCD form.    Adina Knowles  January 25, 2012         RADHA (obstructive sleep apnea) with REM related hypoventilation-Moderate (AHI 26) 10/03/2010     Priority: Medium     Sensorineural hearing loss 06/02/2006     Priority: Medium     Problem list name updated by automated process. Provider to review        Past Medical History:    Past Medical History:   Diagnosis Date     Anemia      Arrhythmia      Atrial fibrillation (H) 9/17/2013     Chronic infection      Chronic kidney disease      Diabetes mellitus      Difficulty in walking(719.7)      Gastro-oesophageal reflux disease      History of blood transfusion      Hypertension      Obese      Pancreatic disease      Pancreatitis      Sleep apnea      Thrombosis of leg      Past Surgical History:    Past Surgical History:   Procedure Laterality Date     COLONOSCOPY N/A 11/8/2018    Procedure: colonoscopy;  Surgeon: Adrian Chowdary MD;  Location: [facility]     ENDOSCOPIC INSERTION TUBE GASTROSTOMY   9/20/2013    Procedure: ENDOSCOPIC INSERTION TUBE GASTROSTOMY;  Exchange of Gastrojejunostomy Tube , dilation of cyst gastrostomy, endoscopic pancreatocolengiogram with dilation of cyst gastrostomy and stent removal, exchange of cyst duodenostomy stent;  Surgeon: Negro Munguia MD;  Location: [facility]     ENDOSCOPIC INSERTION TUBE JEJUNOSTOMY  8/19/2013    Procedure: ENDOSCOPIC INSERTION TUBE JEJUNOSTOMY;;  Surgeon: Drew Palacios MD;  Location[facility]     ENDOSCOPIC RETROGRADE CHOLANGIOPANCREATOGRAM  8/19/2013    Procedure: ENDOSCOPIC RETROGRADE CHOLANGIOPANCREATOGRAM;;  Surgeon: Drew Palacios MD;  [facility]     ENDOSCOPIC RETROGRADE CHOLANGIOPANCREATOGRAM  9/12/2013    Procedure: ENDOSCOPIC RETROGRADE CHOLANGIOPANCREATOGRAM;  Endoscopic Retrograde Cholangiopancreatogram with necrosectomy, dilation of wall of necrosis, stent removal x 4 and gastro-jejunal tube placement.;  Surgeon: Negro Munguia MD;  [facility]     ENDOSCOPIC RETROGRADE CHOLANGIOPANCREATOGRAM  10/7/2013    Procedure: ENDOSCOPIC RETROGRADE CHOLANGIOPANCREATOGRAM;  endoscopic retrograde cholangiopancreatogram, cyst gastrostomy stent exchange, necrosectomy.;  Surgeon: Drew Palacios MD;  [facility]     ENDOSCOPIC RETROGRADE CHOLANGIOPANCREATOGRAM  11/18/2013    Procedure: ENDOSCOPIC RETROGRADE CHOLANGIOPANCREATOGRAM;  endoscopic retrograde cholangiopancreatogram with necrosectomy;  Surgeon: Drew Palacios MD;  [facility]     ENDOSCOPIC RETROGRADE CHOLANGIOPANCREATOGRAM  3/12/2014    Procedure: ENDOSCOPIC RETROGRADE CHOLANGIOPANCREATOGRAM;  Endoscopic Retrograde Cholangiopancreatogram with Stent Placement in Cyst Gastrostomy;  Surgeon: Winter Bowden MD;  [facility]     ENDOSCOPIC RETROGRADE CHOLANGIOPANCREATOGRAPHY, LITHOTRIPSY PANCREAS, COMBINED  9/19/2013    Procedure: COMBINED ENDOSCOPIC RETROGRADE CHOLANGIOPANCREATOGRAPHY, LITHOTRIPSY PANCREAS;  Endoscopic Retrograde Cholangiopancreatogram, placement  of 2 stents in cyst gastrostomy;  Surgeon: Drew Palacios MD;  [facility]     ENDOSCOPIC ULTRASOUND UPPER GASTROINTESTINAL TRACT (GI)  7/22/2013    Procedure: ENDOSCOPIC ULTRASOUND UPPER GASTROINTESTINAL TRACT (GI);  Endoscopic Ultrasound;  Surgeon: Drew Palacios MD;  [facility]     ENDOSCOPIC ULTRASOUND UPPER GASTROINTESTINAL TRACT (GI)  8/19/2013    Procedure: ENDOSCOPIC ULTRASOUND UPPER GASTROINTESTINAL TRACT (GI);  Endoscopic ultrasound with fine needle aspiration,  Endorscopic retrograde cholangiopancreatogram with cystgastrostomy,       ENDOSCOPIC ULTRASOUND UPPER GASTROINTESTINAL TRACT (GI)  9/19/2013    Procedure: ENDOSCOPIC ULTRASOUND UPPER GASTROINTESTINAL TRACT (GI);  Endoscopic Ultrasound of Upper Gastrointestinal Tract;  Surgeon: Drew Palacios MD;  [facility]     ENDOSCOPIC ULTRASOUND UPPER GASTROINTESTINAL TRACT (GI)  8/7/2014    Procedure: ENDOSCOPIC ULTRASOUND, ESOPHAGOSCOPY / UPPER GASTROINTESTINAL TRACT (GI);  Surgeon: Adrian Plaza MD;  [facility]     ESOPHAGOSCOPY, GASTROSCOPY, DUODENOSCOPY (EGD), COMBINED  12/17/2013    Procedure: COMBINED ESOPHAGOSCOPY, GASTROSCOPY, DUODENOSCOPY (EGD);  EGD with stent removal and GJ tube replacement;  Surgeon: Negro Munguia MD;  [facility]     ESOPHAGOSCOPY, GASTROSCOPY, DUODENOSCOPY (EGD), COMBINED  12/14/2013    Procedure: COMBINED ESOPHAGOSCOPY, GASTROSCOPY, DUODENOSCOPY (EGD);;  Surgeon: Winter Bowden MD;  [facility]     ESOPHAGOSCOPY, GASTROSCOPY, DUODENOSCOPY (EGD), COMBINED  7/29/2014    Procedure: COMBINED ESOPHAGOSCOPY, GASTROSCOPY, DUODENOSCOPY (EGD);  Surgeon: Negro Munguia MD;  [facility]     GASTROSTOMY TUBE       HC COLONOSCOPY THRU STOMA, DIAGNOSTIC  2007    Normal     LAPAROSCOPIC CHOLECYSTECTOMY  7/29/2014    Procedure: LAPAROSCOPIC CHOLECYSTECTOMY;  Surgeon: Olga Bruner MD;  Location[facility]     PICC INSERTION  12/20/2013    5fr DL Power PICC, 41cm (1cm external) in  the R basilic vein w/ tip in the  mid SVC.     Family History:    Family History   Problem Relation Age of Onset     Diabetes Mother      Eye Disorder Mother         blind from Diab.     Heart Disease Mother      Obesity Mother      Alcohol/Drug Father      Diabetes Maternal Grandmother      Eye Disorder Brother         Macular Degeneration     Cancer Sister         Brain cancer     C.A.D. Brother         2 bothers 1 sister in 60s     Cancer - colorectal No family hx of      Social History:  Marital Status:   [2]  Social History     Tobacco Use     Smoking status: Never Smoker     Smokeless tobacco: Never Used   Substance Use Topics     Alcohol use: Yes     Comment: rare     Drug use: No      Medications:         lidocaine (LIDODERM) 5 % patch       blood glucose (NO BRAND SPECIFIED) lancets standard       blood glucose (NO BRAND SPECIFIED) test strip       blood glucose (NO BRAND SPECIFIED) test strip       blood glucose calibration (NO BRAND SPECIFIED) solution       blood glucose monitoring (NO BRAND SPECIFIED) meter device kit       furosemide (LASIX) 20 MG tablet       insulin aspart (NOVOLOG FLEXPEN) 100 UNIT/ML pen       insulin glargine (LANTUS SOLOSTAR) 100 UNIT/ML pen       insulin pen needle (RELION PEN NEEDLES) 31G X 6 MM miscellaneous       RELION PEN NEEDLES 31G X 6 MM miscellaneous       STATIN NOT PRESCRIBED, INTENTIONAL,       tamsulosin (FLOMAX) 0.4 MG capsule      Review of Systems   Constitutional: Negative for chills and fever.   Eyes: Negative for visual disturbance.   Respiratory: Positive for shortness of breath. Negative for cough and wheezing.    Cardiovascular: Positive for chest pain (left sided chest wall). Negative for palpitations and leg swelling.   Gastrointestinal: Negative for abdominal pain, diarrhea, nausea and vomiting.   Musculoskeletal: Positive for back pain (left thoracic ribs).   Skin: Negative for color change and wound.   Neurological: Negative for dizziness,  "light-headedness and headaches.     Physical Exam   BP: 117/71  Pulse: 91  Temp: 97.2  F (36.2  C)  Resp: 18  Height: 175.3 cm (5' 9\")  Weight: 117.9 kg (260 lb)  SpO2: 96 %  Physical Exam  Constitutional:       General: He is not in acute distress.     Appearance: He is not ill-appearing or diaphoretic.   HENT:      Head: Normocephalic and atraumatic.   Cardiovascular:      Rate and Rhythm: Normal rate.      Heart sounds: No murmur. No friction rub. No gallop.       Comments: Tenderness to palpation of mid anterior and lateral chest wall  Pulmonary:      Effort: Pulmonary effort is normal. No respiratory distress.      Breath sounds: Normal breath sounds. No wheezing, rhonchi or rales.   Abdominal:      Palpations: Abdomen is soft.      Tenderness: There is no abdominal tenderness. There is no guarding or rebound.   Skin:     General: Skin is warm and dry.      Findings: No abrasion, ecchymosis, erythema, laceration or rash.   Neurological:      Mental Status: He is alert.      Sensory: No sensory deficit.       ED Course        Procedures        Critical Care time:  none          Results for orders placed or performed during the hospital encounter of 01/19/21 (from the past 24 hour(s))   Ribs XR, unilat 3 views + PA chest,  left    Narrative    XR RIBS & CHEST LT 3VW 1/19/2021 3:15 PM     HISTORY: pain after fall 1/17/21    COMPARISON: 12/21/2013      Impression    IMPRESSION: Acute nondisplaced fractures of the left anterolateral  fifth and sixth ribs. No pneumothorax. Focal scarring in the left mid  and lower lung. No acute pulmonary infiltrates. Normal variant azygos  fissure. Normal heart size and pulmonary vascularity. No pleural  effusions are evident.    MELECIO NEWELL MD     Medications - No data to display    Assessments & Plan (with Medical Decision Making)     I have reviewed the nursing notes.    I have reviewed the findings, diagnosis, plan and need for follow up with the patient.       Discharge " Medication List as of 1/19/2021  3:46 PM      START taking these medications    Details   lidocaine (LIDODERM) 5 % patch Place 1 patch onto the skin every 24 hours for 10 daysDisp-10 patch, [id number]           Final diagnoses:   Rib fractures     75-year-old male presents to the emergency department with concern over left-sided chest pain after sustaining a fall at least 3 days prior to arrival.  He had stable vital signs upon arrival.  Physical exam findings as described above were significant for tenderness palpation on the anterior and lateral left chest wall which was reproducible with palpation and movement.  He did have x-ray of the chest with left rib detail which did show acute nondisplaced fractures of the fifth and sixth ribs.  We did discuss options for pain management was instructed to continue over-the-counter medications Tylenol.  Will trial Lidoderm patch.  I did discuss possibility of opioid for pain control however given potential side effects patient agreed to defer at this time.  He was discharged home with instruction to follow-up with his primary care provider if no improvement within the next 5 to 7 days.  Worrisome reasons to return to the ER/UC sooner discussed.    Disclaimer: This note consists of symbols derived from keyboarding, dictation, and/or voice recognition software. As a result, there may be errors in the script that have gone undetected.  Please consider this when interpreting information found in the chart.    1/19/2021   [facility] EMERGENCY DEPT     Kierra Qureshi PA-C  01/20/21 1010

## 2021-01-19 NOTE — TELEPHONE ENCOUNTER
Reason for call:    Symptom or request:     Wife called stating patient had a fall on the ice on saturday, left side rib pain, hurt to cough, breath deeply. Using aleve.  Pain level 8 out of 10.    She is reports patient does not want to come in clinic.      Best Time:  any    Can we leave a detailed message on this number?  YES     Dasha OROURKE  Station

## 2021-01-19 NOTE — TELEPHONE ENCOUNTER
Reason for Disposition    SEVERE chest pain    Difficulty breathing and not severe    Additional Information    Negative: Chest pain lasting longer than 5 minutes    Negative: Intermittent chest pain and pain has been increasing in severity or frequency    Negative: Dizziness or lightheadedness    Negative: Coughing up blood    Negative: Patient sounds very sick or weak to the triager    Negative: SEVERE chest pain    Negative: Pain also present in shoulder(s) or arm(s) or jaw    Negative: Difficulty breathing    Negative: Cocaine use within last 3 days    Negative: History of prior 'blood clot' in leg or lungs (i.e., deep vein thrombosis, pulmonary embolism)    Negative: Recent illness requiring prolonged bed rest (i.e., immobilization)    Negative: Hip or leg fracture in past 2 months (e.g, or had cast on leg or ankle)    Negative: Major surgery in the past month    Negative: Recent long-distance travel with prolonged time in car, bus, plane, or train (i.e., within past 2 weeks; 6 or more hours duration)    Negative: Heart beating irregularly or very rapidly    Followed an injury to chest    Negative: Major injury from dangerous force or speed (e.g., MVA, fall > 10 feet or 3 meters)    Negative: Bullet wound, knife wound or other penetrating object    Negative: Puncture wound that sounds life-threatening to the triager    Negative: Severe difficulty breathing (e.g., struggling for each breath, speaks in single words)    Negative: Major bleeding (actively dripping or spurting) that can't be stopped    Negative: Open wound of the chest with sound of moving air (sucking wound) or visible air bubbles    Negative: Shock suspected (e.g., cold/pale/clammy skin, too weak to stand, low BP, rapid pulse)    Negative: Coughing or spitting up blood    Negative: Bluish (or gray) lips or face    Negative: Unconscious or was unconscious    Negative: Sounds like a life-threatening emergency to the triager    Negative: Chest  "pain not from an injury    Negative: Wound looks infected    Answer Assessment - Initial Assessment Questions  1. MECHANISM: \"How did the injury happen?\"      Fell on ice and landed on left side 3 days ago  2. ONSET: \"When did the injury happen?\" (Minutes or hours ago)      3 days ago  3. LOCATION: \"Where on the chest is the injury located?\"      Left side of chest is very painful  4. APPEARANCE: \"What does the injury look like?\"      Some bruising  5. BLEEDING: \"Is there any bleeding now? If so, ask: How long has it been bleeding?\"      Denies   6. SEVERITY: \"Any difficulty with breathing?\"      Yes, very painful to take a deep breath  7. SIZE: For cuts, bruises, or swelling, ask: \"How large is it?\" (e.g., inches or centimeters)      None noted  8. PAIN: \"Is there pain?\" If so, ask: \"How bad is the pain?\"   (e.g., Scale 1-10; or mild, moderate, severe)      Severe, 8 0f 10  9. TETANUS: For any breaks in the skin, ask: \"When was the last tetanus booster?\"      Not asked  10. PREGNANCY: \"Is there any chance you are pregnant?\" \"When was your last menstrual period?\"        NA    Protocols used: CHEST INJURY-A-OH, CHEST PAIN-A-OH      "

## 2021-01-20 ASSESSMENT — ENCOUNTER SYMPTOMS
COUGH: 0
BACK PAIN: 1
NAUSEA: 0
PALPITATIONS: 0
HEADACHES: 0
WHEEZING: 0
ABDOMINAL PAIN: 0
VOMITING: 0
DIZZINESS: 0
DIARRHEA: 0
LIGHT-HEADEDNESS: 0
COLOR CHANGE: 0
WOUND: 0
SHORTNESS OF BREATH: 1
FEVER: 0
CHILLS: 0

## 2021-02-03 DIAGNOSIS — E11.65 TYPE 2 DIABETES MELLITUS WITH HYPERGLYCEMIA, WITH LONG-TERM CURRENT USE OF INSULIN (H): ICD-10-CM

## 2021-02-03 DIAGNOSIS — Z79.4 TYPE 2 DIABETES MELLITUS WITH HYPERGLYCEMIA, WITH LONG-TERM CURRENT USE OF INSULIN (H): ICD-10-CM

## 2021-02-03 NOTE — TELEPHONE ENCOUNTER
"Requested Prescriptions   Pending Prescriptions Disp Refills     RELION PEN NEEDLES 31G X 6 MM miscellaneous [Pharmacy Med Name: RELION PEN NEEDLES [id number] MIS]  0     Sig: USE FOR ADMINISTERING INSULIN 5 TIMES DAILY OR AS DIRECTED       Diabetic Supplies Protocol Passed - 2/3/2021  7:54 AM        Passed - Medication is active on med list        Passed - Patient is 18 years of age or older        Passed - Recent (6 mo) or future (30 days) visit within the authorizing provider's specialty     Patient had office visit in the last 6 months or has a visit in the next 30 days with authorizing provider.  See \"Patient Info\" tab in inbasket, or \"Choose Columns\" in Meds & Orders section of the refill encounter.                 "

## 2021-02-04 RX ORDER — PEN NEEDLE, DIABETIC 32GX 5/32"
NEEDLE, DISPOSABLE MISCELLANEOUS
Qty: 150 EACH | Refills: 0 | Status: SHIPPED | OUTPATIENT
Start: 2021-02-04 | End: 2021-03-04

## 2021-02-24 ENCOUNTER — IMMUNIZATION (OUTPATIENT)
Dept: FAMILY MEDICINE | Facility: CLINIC | Age: 76
End: 2021-02-24
Payer: COMMERCIAL

## 2021-02-24 PROCEDURE — 0001A PR COVID VAC PFIZER DIL RECON 30 MCG/0.3 ML IM: CPT

## 2021-02-24 PROCEDURE — 91300 PR COVID VAC PFIZER DIL RECON 30 MCG/0.3 ML IM: CPT

## 2021-03-04 DIAGNOSIS — Z79.4 TYPE 2 DIABETES MELLITUS WITH HYPERGLYCEMIA, WITH LONG-TERM CURRENT USE OF INSULIN (H): ICD-10-CM

## 2021-03-04 DIAGNOSIS — E11.65 TYPE 2 DIABETES MELLITUS WITH HYPERGLYCEMIA, WITH LONG-TERM CURRENT USE OF INSULIN (H): ICD-10-CM

## 2021-03-04 RX ORDER — PEN NEEDLE, DIABETIC 32GX 5/32"
NEEDLE, DISPOSABLE MISCELLANEOUS
Qty: 150 EACH | Refills: 5 | Status: SHIPPED | OUTPATIENT
Start: 2021-03-04 | End: 2021-09-21

## 2021-03-17 ENCOUNTER — IMMUNIZATION (OUTPATIENT)
Dept: FAMILY MEDICINE | Facility: CLINIC | Age: 76
End: 2021-03-17
Attending: FAMILY MEDICINE
Payer: COMMERCIAL

## 2021-03-17 PROCEDURE — 91300 PR COVID VAC PFIZER DIL RECON 30 MCG/0.3 ML IM: CPT

## 2021-03-17 PROCEDURE — 0002A PR COVID VAC PFIZER DIL RECON 30 MCG/0.3 ML IM: CPT

## 2021-04-10 ENCOUNTER — HEALTH MAINTENANCE LETTER (OUTPATIENT)
Age: 76
End: 2021-04-10

## 2021-04-12 DIAGNOSIS — N40.0 BENIGN NON-NODULAR PROSTATIC HYPERPLASIA: ICD-10-CM

## 2021-04-14 RX ORDER — TAMSULOSIN HYDROCHLORIDE 0.4 MG/1
CAPSULE ORAL
Qty: 180 CAPSULE | Refills: 0 | Status: SHIPPED | OUTPATIENT
Start: 2021-04-14 | End: 2021-07-20

## 2021-05-04 ENCOUNTER — TELEPHONE (OUTPATIENT)
Dept: FAMILY MEDICINE | Facility: CLINIC | Age: 76
End: 2021-05-04

## 2021-05-06 ENCOUNTER — OFFICE VISIT (OUTPATIENT)
Dept: FAMILY MEDICINE | Facility: CLINIC | Age: 76
End: 2021-05-06
Payer: COMMERCIAL

## 2021-05-06 VITALS
RESPIRATION RATE: 16 BRPM | OXYGEN SATURATION: 99 % | TEMPERATURE: 96.3 F | HEIGHT: 67 IN | BODY MASS INDEX: 41.91 KG/M2 | HEART RATE: 68 BPM | DIASTOLIC BLOOD PRESSURE: 78 MMHG | SYSTOLIC BLOOD PRESSURE: 140 MMHG | WEIGHT: 267 LBS

## 2021-05-06 DIAGNOSIS — E78.5 HYPERLIPIDEMIA LDL GOAL <100: ICD-10-CM

## 2021-05-06 DIAGNOSIS — E11.51 TYPE 2 DIABETES MELLITUS WITH DIABETIC PERIPHERAL ANGIOPATHY WITHOUT GANGRENE, WITH LONG-TERM CURRENT USE OF INSULIN (H): ICD-10-CM

## 2021-05-06 DIAGNOSIS — E11.51 TYPE 2 DIABETES MELLITUS WITH DIABETIC PERIPHERAL ANGIOPATHY WITHOUT GANGRENE, WITH LONG-TERM CURRENT USE OF INSULIN (H): Primary | ICD-10-CM

## 2021-05-06 DIAGNOSIS — Z00.00 MEDICARE ANNUAL WELLNESS VISIT, SUBSEQUENT: Primary | ICD-10-CM

## 2021-05-06 DIAGNOSIS — Z79.4 TYPE 2 DIABETES MELLITUS WITH DIABETIC PERIPHERAL ANGIOPATHY WITHOUT GANGRENE, WITH LONG-TERM CURRENT USE OF INSULIN (H): Primary | ICD-10-CM

## 2021-05-06 DIAGNOSIS — Z79.4 TYPE 2 DIABETES MELLITUS WITH DIABETIC PERIPHERAL ANGIOPATHY WITHOUT GANGRENE, WITH LONG-TERM CURRENT USE OF INSULIN (H): ICD-10-CM

## 2021-05-06 DIAGNOSIS — E87.5 HYPERKALEMIA: Primary | ICD-10-CM

## 2021-05-06 DIAGNOSIS — E66.01 MORBID OBESITY DUE TO EXCESS CALORIES (H): ICD-10-CM

## 2021-05-06 LAB
ALT SERPL W P-5'-P-CCNC: 35 U/L (ref 0–70)
ANION GAP SERPL CALCULATED.3IONS-SCNC: 5 MMOL/L (ref 3–14)
BUN SERPL-MCNC: 22 MG/DL (ref 7–30)
CALCIUM SERPL-MCNC: 9.1 MG/DL (ref 8.5–10.1)
CHLORIDE SERPL-SCNC: 106 MMOL/L (ref 94–109)
CHOLEST SERPL-MCNC: 129 MG/DL
CO2 SERPL-SCNC: 27 MMOL/L (ref 20–32)
CREAT SERPL-MCNC: 0.97 MG/DL (ref 0.66–1.25)
GFR SERPL CREATININE-BSD FRML MDRD: 75 ML/MIN/{1.73_M2}
GLUCOSE SERPL-MCNC: 232 MG/DL (ref 70–99)
HBA1C MFR BLD: 8.2 % (ref 0–5.6)
HDLC SERPL-MCNC: 50 MG/DL
LDLC SERPL CALC-MCNC: 55 MG/DL
NONHDLC SERPL-MCNC: 79 MG/DL
POTASSIUM SERPL-SCNC: 5.4 MMOL/L (ref 3.4–5.3)
SODIUM SERPL-SCNC: 138 MMOL/L (ref 133–144)
TRIGL SERPL-MCNC: 122 MG/DL

## 2021-05-06 PROCEDURE — 80048 BASIC METABOLIC PNL TOTAL CA: CPT | Performed by: NURSE PRACTITIONER

## 2021-05-06 PROCEDURE — 80061 LIPID PANEL: CPT | Performed by: NURSE PRACTITIONER

## 2021-05-06 PROCEDURE — 36415 COLL VENOUS BLD VENIPUNCTURE: CPT | Performed by: NURSE PRACTITIONER

## 2021-05-06 PROCEDURE — 84460 ALANINE AMINO (ALT) (SGPT): CPT | Performed by: NURSE PRACTITIONER

## 2021-05-06 PROCEDURE — 99397 PER PM REEVAL EST PAT 65+ YR: CPT | Performed by: NURSE PRACTITIONER

## 2021-05-06 PROCEDURE — 99214 OFFICE O/P EST MOD 30 MIN: CPT | Mod: 25 | Performed by: NURSE PRACTITIONER

## 2021-05-06 PROCEDURE — 83036 HEMOGLOBIN GLYCOSYLATED A1C: CPT | Performed by: NURSE PRACTITIONER

## 2021-05-06 ASSESSMENT — MIFFLIN-ST. JEOR: SCORE: 1904.85

## 2021-05-06 ASSESSMENT — ACTIVITIES OF DAILY LIVING (ADL): CURRENT_FUNCTION: NO ASSISTANCE NEEDED

## 2021-05-06 NOTE — PROGRESS NOTES
"SUBJECTIVE:   Orville Plasencia is a 75 year old male who presents for Preventive Visit.      Patient has been advised of split billing requirements and indicates understanding: Yes   Are you in the first 12 months of your Medicare coverage?  No    Healthy Habits:     In general, how would you rate your overall health?  Fair    Frequency of exercise:  6-7 days/week    Duration of exercise:  15-30 minutes    Do you usually eat at least 4 servings of fruit and vegetables a day, include whole grains    & fiber and avoid regularly eating high fat or \"junk\" foods?  Yes    Taking medications regularly:  Yes    Barriers to taking medications:  None    Medication side effects:  None    Ability to successfully perform activities of daily living:  No assistance needed    Home Safety:  No safety concerns identified    Hearing Impairment:  No hearing concerns    In the past 6 months, have you been bothered by leaking of urine? Yes    In general, how would you rate your overall mental or emotional health?  Good      PHQ-2 Total Score: 0    Additional concerns today:  Yes       Shingles: Check Insurance  Diabetic Foot Exam: Today      Derm Problem: itching in his arms - since last spring    Diabetic Foot Exam today    Morbid obesity: works on diet and exercise    Diabetes Follow-up    How often are you checking your blood sugar? Three times daily  Blood sugar testing frequency justification:  Uncontrolled diabetes  What time of day are you checking your blood sugars (select all that apply)?  Before meals  Have you had any blood sugars above 200?  Yes highest 400     Have you had any blood sugars below 70?  No lowest 80's    What symptoms do you notice when your blood sugar is low?  None    What concerns do you have today about your diabetes? None     Do you have any of these symptoms? (Select all that apply)  No numbness or tingling in feet.  No redness, sores or blisters on feet.  No complaints of excessive thirst.  No reports " of blurry vision.  No significant changes to weight.    Have you had a diabetic eye exam in the last 12 months? No        BP Readings from Last 2 Encounters:   01/19/21 117/71   09/08/20 132/88     Hemoglobin A1C (%)   Date Value   09/08/2020 8.5 (H)   06/02/2020 9.4 (H)     LDL Cholesterol Calculated (mg/dL)   Date Value   01/07/2020 51   12/11/2018 49         Hyperlipidemia Follow-Up      Are you regularly taking any medication or supplement to lower your cholesterol?   No    Are you having muscle aches or other side effects that you think could be caused by your cholesterol lowering medication?  No       Do you feel safe in your environment? Yes    Have you ever done Advance Care Planning? (For example, a Health Directive, POLST, or a discussion with a medical provider or your loved ones about your wishes): Yes, advance care planning is on file.       Fall risk  Fallen 2 or more times in the past year?: No  Any fall with injury in the past year?: Yes  Timed Up and Go Test (>13.5 is fall risk; contact physician) : 11    Cognitive Screening   1) Repeat 3 items (Leader, Season, Table)  All 3 words repeated back.  2) Clock draw: NORMAL  3) 3 item recall: Recalls 3 objects  Results: 3 items recalled: COGNITIVE IMPAIRMENT LESS LIKELY    Mini-CogTM Copyright [place]. Licensed by the author for use in [facility]; reprinted with permission (urban@.[facility]). All rights reserved.      Do you have sleep apnea, excessive snoring or daytime drowsiness?: yes - doesn't uses CPAP     Reviewed and updated as needed this visit by clinical staff  Tobacco  Allergies  Meds   Med Hx  Surg Hx  Fam Hx  Soc Hx        Reviewed and updated as needed this visit by Provider                Social History     Tobacco Use     Smoking status: Never Smoker     Smokeless tobacco: Never Used   Substance Use Topics     Alcohol use: Yes     Comment: rare     If you drink alcohol do you typically have >3 drinks per day or >7 drinks  "per week? No    Alcohol Use 10/26/2011   Prescreen: >3 drinks/day or >7 drinks/week? The patient does not drink >3 drinks per day nor >7 drinks per week.               Current providers sharing in care for this patient include:   Patient Care Team:  Anahi Sky APRN CNP as PCP - General (Nurse Practitioner)  Anahi Sky APRN CNP as Assigned PCP    The following health maintenance items are reviewed in Epic and correct as of today:  Health Maintenance Due   Topic Date Due     ZOSTER IMMUNIZATION (2 of 2) 07/09/2019     DIABETIC FOOT EXAM  05/14/2020     BMP  12/02/2020     LIPID  01/07/2021     EYE EXAM  01/08/2021     FALL RISK ASSESSMENT  01/09/2021     A1C  03/08/2021     BP Readings from Last 3 Encounters:   05/06/21 (!) 140/78   01/19/21 117/71   09/08/20 132/88    Wt Readings from Last 3 Encounters:   05/06/21 121.1 kg (267 lb)   01/19/21 117.9 kg (260 lb)   09/08/20 118.4 kg (261 lb)                  Pneumonia Vaccine:Adults age 65+ who received Pneumovax (PPSV23) at 65 years or older: Should be given PCV13 > 1 year after their most recent PPSV23      Review of Systems  Constitutional, HEENT, cardiovascular, pulmonary, GI, , musculoskeletal, neuro, skin, endocrine and psych systems are negative, except as otherwise noted.    OBJECTIVE:   There were no vitals taken for this visit. Estimated body mass index is 38.4 kg/m  as calculated from the following:    Height as of 1/19/21: 1.753 m (5' 9\").    Weight as of 1/19/21: 117.9 kg (260 lb).  Physical Exam  GENERAL: healthy, alert and no distress  EYES: Eyes grossly normal to inspection, PERRL and conjunctivae and sclerae normal  HENT: ear canals and TM's normal, nose and mouth without ulcers or lesions  NECK: no adenopathy, no asymmetry, masses, or scars and thyroid normal to palpation  RESP: lungs clear to auscultation - no rales, rhonchi or wheezes  CV: regular rate and rhythm, normal S1 S2, no S3 or S4, no murmur, click or rub, no peripheral " "edema and peripheral pulses strong  ABDOMEN: soft, nontender, no hepatosplenomegaly, no masses and bowel sounds normal  MS: no gross musculoskeletal defects noted, no edema  SKIN: no suspicious lesions or rashes  NEURO: Normal strength and tone, mentation intact and speech normal  PSYCH: mentation appears normal, affect normal/bright  Diabetic foot exam: normal DP and PT pulses, no trophic changes or ulcerative lesions, normal sensory exam and normal monofilament exam    Diagnostic Test Results:  Labs reviewed in Epic    ASSESSMENT / PLAN:   1. Medicare annual wellness visit, subsequent      2. Type 2 diabetes mellitus with diabetic peripheral angiopathy without gangrene, with long-term current use of insulin (H)  A1C today  Will continue current therapy    3. Hyperlipidemia LDL goal <100  Tolerating statin  - Lipid panel reflex to direct LDL Fasting  - ALT    4. Morbid obesity due to excess calories (H)  Counseled on diet and exercise      Patient has been advised of split billing requirements and indicates understanding: Yes  COUNSELING:  Reviewed preventive health counseling, as reflected in patient instructions       Regular exercise       Healthy diet/nutrition    Estimated body mass index is 38.4 kg/m  as calculated from the following:    Height as of 1/19/21: 1.753 m (5' 9\").    Weight as of 1/19/21: 117.9 kg (260 lb).    Weight management plan: Discussed healthy diet and exercise guidelines    He reports that he has never smoked. He has never used smokeless tobacco.      Appropriate preventive services were discussed with this patient, including applicable screening as appropriate for cardiovascular disease, diabetes, osteopenia/osteoporosis, and glaucoma.  As appropriate for age/gender, discussed screening for colorectal cancer, prostate cancer, breast cancer, and cervical cancer. Checklist reviewing preventive services available has been given to the patient.    Reviewed patients plan of care and provided " an AVS. The Basic Care Plan (routine screening as documented in Health Maintenance) for Orville meets the Care Plan requirement. This Care Plan has been established and reviewed with the Patient and spouse.    Counseling Resources:  ATP IV Guidelines  Pooled Cohorts Equation Calculator  Breast Cancer Risk Calculator  Breast Cancer: Medication to Reduce Risk  FRAX Risk Assessment  ICSI Preventive Guidelines  Dietary Guidelines for Americans, 2010  USDA's MyPlate  ASA Prophylaxis  Lung CA Screening    JAHAIRA Perdue CNP  [facility]    Identified Health Risks:

## 2021-05-06 NOTE — LETTER
May 6, 2021      Orville Plasencia  [id number] [address] [place] [id number]        Dear ,    We are writing to inform you of your test results.  Your A1C is slightly elevated- I would like you to recheck it in 3 months and continue to monitor your diet and exercise.     Resulted Orders   HEMOGLOBIN A1C   Result Value Ref Range    Hemoglobin A1C 8.2 (H) 0 - 5.6 %      Comment:      Normal <5.7% Prediabetes 5.7-6.4%  Diabetes 6.5% or higher - adopted from ADA   consensus guidelines.         If you have any questions or concerns, please call the clinic at the number listed above.       Sincerely,      JAHAIRA Perdue CNP/bmc

## 2021-05-06 NOTE — PATIENT INSTRUCTIONS
Patient Education   Personalized Prevention Plan  You are due for the preventive services outlined below.  Your care team is available to assist you in scheduling these services.  If you have already completed any of these items, please share that information with your care team to update in your medical record.  Health Maintenance Due   Topic Date Due     Annual Wellness Visit  05/16/2009     Discuss Advance Care Planning  01/25/2017     Zoster (Shingles) Vaccine (2 of 2) 07/09/2019     Diabetic Foot Exam  05/14/2020     Basic Metabolic Panel  12/02/2020     PHQ-2  01/01/2021     Cholesterol Lab  01/07/2021     Eye Exam  01/08/2021     FALL RISK ASSESSMENT  01/09/2021     A1C Lab  03/08/2021

## 2021-05-13 DIAGNOSIS — E11.65 TYPE 2 DIABETES MELLITUS WITH HYPERGLYCEMIA, WITH LONG-TERM CURRENT USE OF INSULIN (H): Primary | ICD-10-CM

## 2021-05-13 DIAGNOSIS — Z79.4 TYPE 2 DIABETES MELLITUS WITH HYPERGLYCEMIA, WITH LONG-TERM CURRENT USE OF INSULIN (H): Primary | ICD-10-CM

## 2021-05-13 RX ORDER — BLOOD SUGAR DIAGNOSTIC
STRIP MISCELLANEOUS
Qty: 100 STRIP | Refills: 4 | Status: SHIPPED | OUTPATIENT
Start: 2021-05-13 | End: 2021-11-02

## 2021-05-13 NOTE — TELEPHONE ENCOUNTER
"Prescription approved per [facility] Refill Protocol.  Liyah Adams RN    Requested Prescriptions   Pending Prescriptions Disp Refills     ACCU-CHEK GUIDE test strip [Pharmacy Med Name: Accu-Chek Guide In Vitro Strip]  0     Sig: USE  STRIP TO CHECK GLUCOSE THREE TIMES DAILY       Diabetic Supplies Protocol Passed - 5/13/2021  5:30 AM        Passed - Medication is active on med list        Passed - Patient is 18 years of age or older        Passed - Recent (6 mo) or future (30 days) visit within the authorizing provider's specialty     Patient had office visit in the last 6 months or has a visit in the next 30 days with authorizing provider.  See \"Patient Info\" tab in inbasket, or \"Choose Columns\" in Meds & Orders section of the refill encounter.                 "

## 2021-05-14 DIAGNOSIS — E11.65 TYPE 2 DIABETES MELLITUS WITH HYPERGLYCEMIA, WITH LONG-TERM CURRENT USE OF INSULIN (H): ICD-10-CM

## 2021-05-14 DIAGNOSIS — E11.51 TYPE 2 DIABETES MELLITUS WITH DIABETIC PERIPHERAL ANGIOPATHY WITHOUT GANGRENE, WITH LONG-TERM CURRENT USE OF INSULIN (H): ICD-10-CM

## 2021-05-14 DIAGNOSIS — Z79.4 TYPE 2 DIABETES MELLITUS WITH HYPERGLYCEMIA, WITH LONG-TERM CURRENT USE OF INSULIN (H): ICD-10-CM

## 2021-05-14 DIAGNOSIS — E87.5 HYPERKALEMIA: ICD-10-CM

## 2021-05-14 DIAGNOSIS — Z79.4 TYPE 2 DIABETES MELLITUS WITH DIABETIC PERIPHERAL ANGIOPATHY WITHOUT GANGRENE, WITH LONG-TERM CURRENT USE OF INSULIN (H): ICD-10-CM

## 2021-05-14 LAB
CREAT UR-MCNC: 150 MG/DL
HBA1C MFR BLD: 8.7 % (ref 0–5.6)
MICROALBUMIN UR-MCNC: 110 MG/L
MICROALBUMIN/CREAT UR: 73.33 MG/G CR (ref 0–17)
POTASSIUM SERPL-SCNC: 4.8 MMOL/L (ref 3.4–5.3)

## 2021-05-14 PROCEDURE — 83036 HEMOGLOBIN GLYCOSYLATED A1C: CPT | Performed by: NURSE PRACTITIONER

## 2021-05-14 PROCEDURE — 84132 ASSAY OF SERUM POTASSIUM: CPT | Performed by: NURSE PRACTITIONER

## 2021-05-14 PROCEDURE — 36415 COLL VENOUS BLD VENIPUNCTURE: CPT | Performed by: NURSE PRACTITIONER

## 2021-05-14 PROCEDURE — 82043 UR ALBUMIN QUANTITATIVE: CPT | Performed by: NURSE PRACTITIONER

## 2021-06-24 ENCOUNTER — PATIENT OUTREACH (OUTPATIENT)
Dept: EDUCATION SERVICES | Facility: CLINIC | Age: 76
End: 2021-06-24
Payer: COMMERCIAL

## 2021-06-24 DIAGNOSIS — Z79.4 TYPE 2 DIABETES MELLITUS WITH HYPERGLYCEMIA, WITH LONG-TERM CURRENT USE OF INSULIN (H): Primary | ICD-10-CM

## 2021-06-24 DIAGNOSIS — E11.65 TYPE 2 DIABETES MELLITUS WITH HYPERGLYCEMIA, WITH LONG-TERM CURRENT USE OF INSULIN (H): Primary | ICD-10-CM

## 2021-06-24 PROCEDURE — G0108 DIAB MANAGE TRN  PER INDIV: HCPCS | Mod: 95

## 2021-06-24 NOTE — PROGRESS NOTES
"Diabetes Self-Management Education & Support    Presents for: Individual review.  Type of Service: Telephone Visit  Audio only visit done, as patient does not have access to audio-visual technology.  Individual visit provided, given no group classes are available for 2 months.   How would patient like to obtain AVS? Mailed logs     SUBJECTIVE/OBJECTIVE:  Presents for: Individual review  Accompanied by: Spouse  Diabetes education in the past 24mo: No  Focus of Visit: Patient Unsure  Diabetes type: Type 2  Disease course: Stable  Other concerns:: None  Cultural Influences/Ethnic Background:  Choose not to answer      Diabetes Symptoms & Complications:  Not assessed at this visit        Patient Problem List and Family Medical History reviewed for relevant medical history, current medical status, and diabetes risk factors.    Vitals:  There were no vitals taken for this visit.  Estimated body mass index is 41.42 kg/m  as calculated from the following:    Height as of 5/6/21: 1.71 m (5' 7.32\").    Weight as of 5/6/21: 121.1 kg (267 lb).   Last 3 BP:   BP Readings from Last 3 Encounters:   05/06/21 (!) 140/78   01/19/21 117/71   09/08/20 132/88       History   Smoking Status     Never Smoker   Smokeless Tobacco     Never Used       Labs:  Lab Results   Component Value Date    A1C 8.7 05/14/2021     Lab Results   Component Value Date     05/06/2021     Lab Results   Component Value Date    LDL 55 05/06/2021     HDL Cholesterol   Date Value Ref Range Status   05/06/2021 50 >39 mg/dL Final   ]  GFR Estimate   Date Value Ref Range Status   05/06/2021 75 >60 mL/min/[1.73_m2] Final     Comment:     Non  GFR Calc  Starting 12/18/2018, serum creatinine based estimated GFR (eGFR) will be   calculated using the Chronic Kidney Disease Epidemiology Collaboration   (CKD-EPI) equation.       GFR Estimate If Black   Date Value Ref Range Status   05/06/2021 87 >60 mL/min/[1.73_m2] Final     Comment:      " American GFR Calc  Starting 12/18/2018, serum creatinine based estimated GFR (eGFR) will be   calculated using the Chronic Kidney Disease Epidemiology Collaboration   (CKD-EPI) equation.       Lab Results   Component Value Date    CR 0.97 05/06/2021     No results found for: MICROALBUMIN    Healthy Eating:  Healthy Eating Assessed Today: Yes    Being Active:  Being Active Assessed Today: Yes    Monitoring:  Monitoring Assessed Today: Yes      Taking Medications:  Diabetes Medication(s)     Insulin       insulin aspart (NOVOLOG FLEXPEN) 100 UNIT/ML pen    INJECT 8 UNITS OF NOVOLOG ALONG WITH USING SLIDING SCALE (MAX TOTAL DOSE OF 13 UNITS EACH INJECTION) BREAKFAST, LUNCH AND DINNER     insulin glargine (LANTUS SOLOSTAR) 100 UNIT/ML pen    INJECT 27 UNITS SUBCUTANEOUSLY TWICE DAILY- increase by 3 units every 4-5 days until glucose under 150        Lantus 27-0-0-27  Novolog - Skips the 8 for food all together if running lower  Sometimes takes 8 units, takings more   Unable to explain exact dosing   12 as a max    < 120   120-140 0 correction + 8 food   141 to 180: 1 unit + 8 food   181 to 220: 2 units + 8   221-260: 3 units + 8   261-300: 4 units + 8   301-349: 5 units + 8   > 350 call MD      Taking Medication Assessed Today: Yes    Problem Solving:  Hyperglycemia and hypoglycemia treatment, signs/symptoms   Insulin action     Reducing Risks:  Reducing Risks Assessed Today: No    Healthy Coping:  Healthy Coping Assessed Today: Yes  Emotional response to diabetes: Ready to learn  Informal Support system:: Spouse  Stage of change: ACTION (Actively working towards change)  Patient Activation Measure Survey Score:  NILSON Score (Last Two) 11/19/2010   NILSON Raw Score 37   Activation Score 49.9   NILSON Level 2       Diabetes knowledge and skills assessment:   Patient is knowledgeable in diabetes management concepts related to: Monitoring  Patient needs further education on the following diabetes management concepts: Taking  Medication, Problem Solving and Reducing Risks  Based on learning assessment above, most appropriate setting for further diabetes education would be: Individual setting.      INTERVENTIONS:    Education provided today on:  AADE Self-Care Behaviors:  Diabetes Pathophysiology  Healthy Eating: consistency in amount, composition, and timing of food intake  Being Active: relationship to blood glucose and precautions to take  Monitoring: individual blood glucose targets  Taking Medication: action of prescribed medication, side effects of prescribed medications and when to take medications  Problem Solving: high blood glucose - causes, signs/symptoms, treatment and prevention, low blood glucose - causes, signs/symptoms, treatment and prevention, carrying a carbohydrate source at all times and when to call health care provider    Opportunities for ongoing education and support in diabetes-self management were discussed.  Pt verbalized understanding of concepts discussed and recommendations provided today.       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  Know that blood sugars run higher when eating out and eating large meals.  Is generally very active.      Spent much time reviewing insulin action of how rapid acting works against the food blood sugar rise. Discussed the difference between food insulin and correction insulin.  There are missed and late doses, which are likely contributing to post prandial peaks and higher A1c.   Patient often skips the insulin dose all together if running lower, and therefor misses the food dose.  Would likely benefit from set doses of Novolog for meals that vary in carbohydrate size (small/medium/large).      Discussed finger sticks and continuous glucose monitors (personal or professional), declines at this time and continue TID finger sticks.  Unable  to make recommendations without blood sugars / insulin dosing. Recommend doing detailed food / insulin / blood sugar logs - Orville's wife Socorro offered to help fill these out.       Patient's most recent  is not meeting goal of <7.0  Lab Results   Component Value Date    A1C 8.7 05/14/2021    A1C 8.2 05/06/2021    A1C 8.5 09/08/2020    A1C 9.4 06/02/2020    A1C 9.2 01/07/2020       PLAN  Detailed food / insulin logs   Follow up in 3 weeks     Ruby Jacobs RD, LD, [facility]  Diabetes Education    Time Spent: 31 minutes  Encounter Type: Individual

## 2021-07-19 ENCOUNTER — VIRTUAL VISIT (OUTPATIENT)
Dept: EDUCATION SERVICES | Facility: CLINIC | Age: 76
End: 2021-07-19
Payer: COMMERCIAL

## 2021-07-19 DIAGNOSIS — Z79.4 TYPE 2 DIABETES MELLITUS WITH HYPERGLYCEMIA, WITH LONG-TERM CURRENT USE OF INSULIN (H): Primary | ICD-10-CM

## 2021-07-19 DIAGNOSIS — E11.65 TYPE 2 DIABETES MELLITUS WITH HYPERGLYCEMIA, WITH LONG-TERM CURRENT USE OF INSULIN (H): Primary | ICD-10-CM

## 2021-07-19 PROCEDURE — 98967 PH1 ASSMT&MGMT NQHP 11-20: CPT | Mod: 95

## 2021-07-19 NOTE — PROGRESS NOTES
Diabetes Follow-up    Subjective/Objective:  Type of Service: Telephone Visit    How would patient like to obtain AVS? reviewed verbally     Diabetes is being managed with   Lifestyle (diet/activity), Diabetes Medications   Diabetes Medication(s)     Insulin       insulin aspart (NOVOLOG FLEXPEN) 100 UNIT/ML pen    INJECT 8 UNITS OF NOVOLOG ALONG WITH USING SLIDING SCALE (MAX TOTAL DOSE OF 13 UNITS EACH INJECTION) BREAKFAST, LUNCH AND DINNER     insulin glargine (LANTUS SOLOSTAR) 100 UNIT/ML pen    INJECT 27 UNITS SUBCUTANEOUSLY TWICE DAILY- increase by 3 units every 4-5 days until glucose under 150      Correction scale starts at 1 unit for every 40 above 140     Portion sizes are the biggest issue - lik es larger portions     BG/Food Lo/3: 147  (9 units for food + correction)  - eggs and lots of fried potatoes,  190 before lunch (10 units)    holiday   Steak and macaroni salad, higher blood sugars at 249 before dinner      298 after lunch (burger gorge and whopper and fries)   7/15: 105 fasting,  Did not check blood at lunch, no insulin, hot dogs and 172 pre dinner   : 103 pre dinner   : fasting 166 oatmeal + (took 9 units )  But no pre lunch, 119 pre dinner.  Light dinner, milkshake   : 70 fasting (light dinner last night)     Lab Results   Component Value Date    A1C 8.7 2021    A1C 8.2 2021    A1C 8.5 2020    A1C 9.4 2020    A1C 9.2 2020         Assessment:  Patient and wife report not doing too bad with insulin dosing.  Started logging  - found this helpful for patterns and identifying what the insulin is doing.  Writing stuff down makes him more aware.  Majority of numbers are < 150 going into a meal.   For dose changes added 5 units for a smaller meal so that patient/wife can pick between 5 OR 8 units at a meal plus sliding scale.  Reviewed using 5 units for meals preceding heavier activity as well.       Plan/Response:  New A1c end of August    Mychart anytime   Decrease to 5 units for smaller meals    Ruby Jacobs RD, LD, [facility]ES  Diabetes Education  Time spent: 19 minutes     Any diabetes medication dose changes were made via the CDE Protocol and Collaborative Practice Agreement with the patient's referring provider. A copy of this encounter was shared with the provider.

## 2021-07-19 NOTE — Clinical Note
RADHA Jacinto sounds to be doing pretty well with insulin dosing with the help of his wife.  Spent a lot of time discussing insulin action and what the mealtime insulin does with food / correction scale etc.    Thanks!  Alfreda

## 2021-07-23 RX ORDER — INSULIN ASPART 100 [IU]/ML
INJECTION, SOLUTION INTRAVENOUS; SUBCUTANEOUS
Qty: 45 ML | Refills: 3 | Status: SHIPPED | OUTPATIENT
Start: 2021-07-23 | End: 2021-12-09

## 2021-08-08 NOTE — TELEPHONE ENCOUNTER
[place] Dept of Trans - diabetic driving form started and routed to Anahi Sky to hold for appt 5/6/2021   PAST MEDICAL HISTORY:  Bell's palsy

## 2021-09-25 ENCOUNTER — HEALTH MAINTENANCE LETTER (OUTPATIENT)
Age: 76
End: 2021-09-25

## 2021-10-05 ENCOUNTER — MYC MEDICAL ADVICE (OUTPATIENT)
Dept: FAMILY MEDICINE | Facility: CLINIC | Age: 76
End: 2021-10-05

## 2021-10-14 ENCOUNTER — IMMUNIZATION (OUTPATIENT)
Dept: FAMILY MEDICINE | Facility: CLINIC | Age: 76
End: 2021-10-14
Payer: MEDICARE

## 2021-10-14 PROCEDURE — 90662 IIV NO PRSV INCREASED AG IM: CPT

## 2021-10-14 PROCEDURE — 91300 PR COVID VAC PFIZER DIL RECON 30 MCG/0.3 ML IM: CPT

## 2021-10-14 PROCEDURE — 0004A PR COVID VAC PFIZER DIL RECON 30 MCG/0.3 ML IM: CPT

## 2021-10-14 PROCEDURE — G0008 ADMIN INFLUENZA VIRUS VAC: HCPCS

## 2021-11-01 DIAGNOSIS — Z79.4 TYPE 2 DIABETES MELLITUS WITH HYPERGLYCEMIA, WITH LONG-TERM CURRENT USE OF INSULIN (H): ICD-10-CM

## 2021-11-01 DIAGNOSIS — E11.65 TYPE 2 DIABETES MELLITUS WITH HYPERGLYCEMIA, WITH LONG-TERM CURRENT USE OF INSULIN (H): ICD-10-CM

## 2021-11-02 RX ORDER — BLOOD SUGAR DIAGNOSTIC
STRIP MISCELLANEOUS
Qty: 100 STRIP | Refills: 1 | Status: SHIPPED | OUTPATIENT
Start: 2021-11-02 | End: 2022-01-11

## 2021-11-09 ENCOUNTER — TELEPHONE (OUTPATIENT)
Dept: FAMILY MEDICINE | Facility: CLINIC | Age: 76
End: 2021-11-09
Payer: COMMERCIAL

## 2021-11-09 DIAGNOSIS — Z79.4 TYPE 2 DIABETES MELLITUS WITH HYPERGLYCEMIA, WITH LONG-TERM CURRENT USE OF INSULIN (H): ICD-10-CM

## 2021-11-09 DIAGNOSIS — E11.65 TYPE 2 DIABETES MELLITUS WITH HYPERGLYCEMIA, WITH LONG-TERM CURRENT USE OF INSULIN (H): ICD-10-CM

## 2021-11-10 RX ORDER — INSULIN GLARGINE 100 [IU]/ML
INJECTION, SOLUTION SUBCUTANEOUS
Qty: 45 ML | Refills: 0 | Status: SHIPPED | OUTPATIENT
Start: 2021-11-10 | End: 2021-12-09

## 2021-11-10 NOTE — TELEPHONE ENCOUNTER
Routing refill request to provider for review/approval because:  Labs not current:  Last A1C was 8.7 on 5/14/21.  Last seen 5/6/21.  Advise.  Ramonita

## 2021-11-11 NOTE — TELEPHONE ENCOUNTER
Spoke to patient and was notified. Appointment was made.      Clara Armstrong on 11/11/2021 at 1:55 PM

## 2021-11-18 DIAGNOSIS — Z79.4 TYPE 2 DIABETES MELLITUS WITH HYPERGLYCEMIA, WITH LONG-TERM CURRENT USE OF INSULIN (H): ICD-10-CM

## 2021-11-18 DIAGNOSIS — E11.65 TYPE 2 DIABETES MELLITUS WITH HYPERGLYCEMIA, WITH LONG-TERM CURRENT USE OF INSULIN (H): ICD-10-CM

## 2021-11-20 ENCOUNTER — HEALTH MAINTENANCE LETTER (OUTPATIENT)
Age: 76
End: 2021-11-20

## 2021-11-22 ENCOUNTER — LAB (OUTPATIENT)
Dept: LAB | Facility: CLINIC | Age: 76
End: 2021-11-22
Payer: COMMERCIAL

## 2021-11-22 DIAGNOSIS — Z79.4 TYPE 2 DIABETES MELLITUS WITH HYPERGLYCEMIA, WITH LONG-TERM CURRENT USE OF INSULIN (H): ICD-10-CM

## 2021-11-22 DIAGNOSIS — E11.65 TYPE 2 DIABETES MELLITUS WITH HYPERGLYCEMIA, WITH LONG-TERM CURRENT USE OF INSULIN (H): ICD-10-CM

## 2021-11-22 LAB — HBA1C MFR BLD: 7.2 % (ref 0–5.6)

## 2021-11-22 PROCEDURE — 36415 COLL VENOUS BLD VENIPUNCTURE: CPT

## 2021-11-22 PROCEDURE — 83036 HEMOGLOBIN GLYCOSYLATED A1C: CPT

## 2021-11-22 RX ORDER — PEN NEEDLE, DIABETIC 32GX 5/32"
NEEDLE, DISPOSABLE MISCELLANEOUS
Qty: 100 EACH | Refills: 3 | Status: SHIPPED | OUTPATIENT
Start: 2021-11-22 | End: 2022-01-11

## 2021-11-22 NOTE — TELEPHONE ENCOUNTER
Pending Prescriptions:                       Disp   Refills    RELION PEN NEEDLES 31G X 6 MM miscellaneou*       0        Sig: USE  FIVE TIMES DAILY AS DIRECTED        Routing refill request to provider for review/approval because:  Patient due for 6 month diabetic check-up. [employer] message sent advising a visit.       Last Written Prescription Date:  9/21/21  Last Fill Quantity: 150,  # refills: 1   Last office visit: 5/6/2021 with prescribing provider  Future Office Visit:  None      May Moss RN

## 2021-12-09 ENCOUNTER — OFFICE VISIT (OUTPATIENT)
Dept: FAMILY MEDICINE | Facility: CLINIC | Age: 76
End: 2021-12-09
Payer: COMMERCIAL

## 2021-12-09 VITALS
HEIGHT: 67 IN | WEIGHT: 264 LBS | OXYGEN SATURATION: 97 % | DIASTOLIC BLOOD PRESSURE: 86 MMHG | SYSTOLIC BLOOD PRESSURE: 132 MMHG | BODY MASS INDEX: 41.44 KG/M2 | TEMPERATURE: 97.5 F | RESPIRATION RATE: 16 BRPM | HEART RATE: 71 BPM

## 2021-12-09 DIAGNOSIS — N40.0 BENIGN NON-NODULAR PROSTATIC HYPERPLASIA: ICD-10-CM

## 2021-12-09 DIAGNOSIS — I10 HYPERTENSION GOAL BP (BLOOD PRESSURE) < 140/90: ICD-10-CM

## 2021-12-09 DIAGNOSIS — H61.23 BILATERAL IMPACTED CERUMEN: ICD-10-CM

## 2021-12-09 DIAGNOSIS — Z79.4 TYPE 2 DIABETES MELLITUS WITH HYPERGLYCEMIA, WITH LONG-TERM CURRENT USE OF INSULIN (H): ICD-10-CM

## 2021-12-09 DIAGNOSIS — E11.65 TYPE 2 DIABETES MELLITUS WITH HYPERGLYCEMIA, WITH LONG-TERM CURRENT USE OF INSULIN (H): ICD-10-CM

## 2021-12-09 DIAGNOSIS — N18.32 CHRONIC KIDNEY DISEASE, STAGE 3B (H): ICD-10-CM

## 2021-12-09 DIAGNOSIS — R60.0 LOCALIZED EDEMA: Primary | ICD-10-CM

## 2021-12-09 PROCEDURE — 99214 OFFICE O/P EST MOD 30 MIN: CPT | Mod: 25 | Performed by: NURSE PRACTITIONER

## 2021-12-09 PROCEDURE — 69209 REMOVE IMPACTED EAR WAX UNI: CPT | Mod: 50 | Performed by: NURSE PRACTITIONER

## 2021-12-09 RX ORDER — TAMSULOSIN HYDROCHLORIDE 0.4 MG/1
CAPSULE ORAL
Qty: 180 CAPSULE | Refills: 1 | Status: SHIPPED | OUTPATIENT
Start: 2021-12-09 | End: 2022-08-03

## 2021-12-09 RX ORDER — INSULIN ASPART 100 [IU]/ML
INJECTION, SOLUTION INTRAVENOUS; SUBCUTANEOUS
Qty: 45 ML | Refills: 3 | Status: SHIPPED | OUTPATIENT
Start: 2021-12-09 | End: 2022-03-15

## 2021-12-09 RX ORDER — INSULIN GLARGINE 100 [IU]/ML
INJECTION, SOLUTION SUBCUTANEOUS
Qty: 45 ML | Refills: 4 | Status: SHIPPED | OUTPATIENT
Start: 2021-12-09 | End: 2022-10-06

## 2021-12-09 RX ORDER — FUROSEMIDE 20 MG
20 TABLET ORAL DAILY PRN
Qty: 90 TABLET | Refills: 1 | Status: SHIPPED | OUTPATIENT
Start: 2021-12-09 | End: 2022-10-06

## 2021-12-09 ASSESSMENT — MIFFLIN-ST. JEOR: SCORE: 1886.21

## 2021-12-09 NOTE — PATIENT INSTRUCTIONS
Thank you for choosing [facility].  You may be receiving an email and/or telephone survey request from [facility] Customer Experience regarding your visit today.  Please take a few minutes to respond to the survey to let us know how we are doing.      If you have questions or concerns, please contact us via [employer] or you can contact your care team at [phone] option 2.    Our Clinic hours are:  Monday - Thursday 7am-6pm  Friday 7am-5pm    The [place] outpatient lab hours are:  Monday - Friday 7am-4:30pm    Appointments are required, call [phone]    If you have clinical questions after hours or would like to schedule an appointment,  call the clinic at [phone].

## 2021-12-09 NOTE — PROGRESS NOTES
Assessment & Plan      Type 2 diabetes mellitus with hyperglycemia, with long-term current use of insulin (H)  controlled  -Refilled  insulin aspart (NOVOLOG FLEXPEN) 100 UNIT/ML pen; Inject  5 to 8units of Novolog before meals based on carbohydrate amount + pre meal correction (Max total dose of 13 units each injection for breakfast, lunch and dinner)  - Refilled insulin glargine (LANTUS SOLOSTAR) 100 UNIT/ML pen; INJECT 27 UNITS SUBCUTANEOUSLY TWICE DAILY INCREASE  BY  3  UNITS  EVERY  4-5  DAYS  UNTIL  GLUCOSE  IS  UNDER  150    Benign non-nodular prostatic hyperplasia  controlled  - Refilled tamsulosin (FLOMAX) 0.4 MG capsule; Take 2 capsules by mouth once daily    Hypertension goal BP (blood pressure) < 140/90  controlled  - Refilled furosemide (LASIX) 20 MG tablet; Take 1 tablet (20 mg) by mouth daily as needed (edema in legs)    Edema:  Stable-   Refilled Lasix         Chronic kidney disease, stage 3b (H)  Comment:  Plan: Creatinine is stable    Impacted cerumen:  Both ear canals were irrigated- patient tolerated procedure well      Follow up in 6 months     No follow-ups on file.    JAHAIRA Perdue CNP  [facility]    Jackie Jacinto is a 76 year old who presents for the following health issues  accompanied by his self.    HPI         Diabetes Follow-up    How often are you checking your blood sugar? Three times daily  Blood sugar testing frequency justification:  Routine checking.  What time of day are you checking your blood sugars (select all that apply)?  Before meals  Have you had any blood sugars above 200?  Yes can get to the high 200's or 300 once in awhile.  Lately they have not been that high.  This am his reading was 70.  Have you had any blood sugars below 70?  Yes     What symptoms do you notice when your blood sugar is low?  Weak    What concerns do you have today about your diabetes? None     Do you have any of these symptoms? (Select all that apply)  No  "numbness or tingling in feet.  No redness, sores or blisters on feet.  No complaints of excessive thirst.  No reports of blurry vision.  No significant changes to weight.    Have you had a diabetic eye exam in the last 12 months? No        BP Readings from Last 2 Encounters:   12/09/21 132/86   05/06/21 (!) 140/78     Hemoglobin A1C (%)   Date Value   11/22/2021 7.2 (H)   05/14/2021 8.7 (H)   05/06/2021 8.2 (H)     LDL Cholesterol Calculated (mg/dL)   Date Value   05/06/2021 55   01/07/2020 51           How many servings of fruits and vegetables do you eat daily?  4 or more    On average, how many sweetened beverages do you drink each day (Examples: soda, juice, sweet tea, etc.  Do NOT count diet or artificially sweetened beverages)?   0    How many days per week do you exercise enough to make your heart beat faster? 6    How many minutes a day do you exercise enough to make your heart beat faster? 30 - 60    How many days per week do you miss taking your medication? 0    Medication Followup of BPH    Taking Medication as prescribed: yes    Side Effects:  None    Medication Helping Symptoms:  He is getting up every couple of hours to urinate.       Wants his ears looked at to see if they have wax in them.    Chronic kidney disease- creatinine has been stable    Review of Systems   Constitutional, HEENT, cardiovascular, pulmonary, GI, , musculoskeletal, neuro, skin, endocrine and psych systems are negative, except as otherwise noted.      Objective    /86   Pulse 71   Temp 97.5  F (36.4  C) (Tympanic)   Resp 16   Ht 1.71 m (5' 7.32\")   Wt 119.7 kg (264 lb)   SpO2 97%   BMI 40.96 kg/m    Body mass index is 40.96 kg/m .  Physical Exam   GENERAL: healthy, alert and no distress  HENT: bilateral ear canals impacted with cerumen  RESP: lungs clear to auscultation - no rales, rhonchi or wheezes  CV: regular rates and rhythm, normal S1 S2, no S3 or S4, no murmur, click or rub, peripheral pulses strong and " bilateral 1+ edema+ bilateral lower extremity pitting edema     MS: no gross musculoskeletal defects noted, no edema  Diabetic Foot Exam: normal exam- dry skin, + monofilament

## 2021-12-17 ENCOUNTER — OFFICE VISIT (OUTPATIENT)
Dept: URGENT CARE | Facility: URGENT CARE | Age: 76
End: 2021-12-17
Payer: COMMERCIAL

## 2021-12-17 ENCOUNTER — APPOINTMENT (OUTPATIENT)
Dept: GENERAL RADIOLOGY | Facility: CLINIC | Age: 76
End: 2021-12-17
Attending: STUDENT IN AN ORGANIZED HEALTH CARE EDUCATION/TRAINING PROGRAM
Payer: COMMERCIAL

## 2021-12-17 ENCOUNTER — E-VISIT (OUTPATIENT)
Dept: URGENT CARE | Facility: CLINIC | Age: 76
End: 2021-12-17
Payer: COMMERCIAL

## 2021-12-17 ENCOUNTER — HOSPITAL ENCOUNTER (EMERGENCY)
Facility: CLINIC | Age: 76
Discharge: HOME | End: 2021-12-17
Attending: STUDENT IN AN ORGANIZED HEALTH CARE EDUCATION/TRAINING PROGRAM | Admitting: STUDENT IN AN ORGANIZED HEALTH CARE EDUCATION/TRAINING PROGRAM
Payer: COMMERCIAL

## 2021-12-17 VITALS
BODY MASS INDEX: 40.49 KG/M2 | RESPIRATION RATE: 9 BRPM | SYSTOLIC BLOOD PRESSURE: 222 MMHG | TEMPERATURE: 97 F | DIASTOLIC BLOOD PRESSURE: 118 MMHG | WEIGHT: 261 LBS | HEART RATE: 82 BPM | OXYGEN SATURATION: 99 %

## 2021-12-17 VITALS
TEMPERATURE: 98.2 F | SYSTOLIC BLOOD PRESSURE: 223 MMHG | WEIGHT: 261.8 LBS | OXYGEN SATURATION: 96 % | BODY MASS INDEX: 40.61 KG/M2 | HEART RATE: 92 BPM | DIASTOLIC BLOOD PRESSURE: 118 MMHG

## 2021-12-17 DIAGNOSIS — R09.81 NASAL CONGESTION: ICD-10-CM

## 2021-12-17 DIAGNOSIS — R05.9 COUGH: ICD-10-CM

## 2021-12-17 DIAGNOSIS — R03.0 ELEVATED BLOOD PRESSURE READING: ICD-10-CM

## 2021-12-17 DIAGNOSIS — I16.1 HYPERTENSIVE EMERGENCY: Primary | ICD-10-CM

## 2021-12-17 DIAGNOSIS — R06.02 SOB (SHORTNESS OF BREATH): Primary | ICD-10-CM

## 2021-12-17 LAB
ALBUMIN SERPL-MCNC: 3.3 G/DL (ref 3.4–5)
ALP SERPL-CCNC: 170 U/L (ref 40–150)
ALT SERPL W P-5'-P-CCNC: 53 U/L (ref 0–70)
ANION GAP SERPL CALCULATED.3IONS-SCNC: 7 MMOL/L (ref 3–14)
AST SERPL W P-5'-P-CCNC: 31 U/L (ref 0–45)
BASOPHILS # BLD AUTO: 0 10E3/UL (ref 0–0.2)
BASOPHILS NFR BLD AUTO: 0 %
BILIRUB SERPL-MCNC: 0.6 MG/DL (ref 0.2–1.3)
BUN SERPL-MCNC: 18 MG/DL (ref 7–30)
CALCIUM SERPL-MCNC: 9 MG/DL (ref 8.5–10.1)
CHLORIDE BLD-SCNC: 102 MMOL/L (ref 94–109)
CO2 SERPL-SCNC: 28 MMOL/L (ref 20–32)
CREAT SERPL-MCNC: 0.89 MG/DL (ref 0.66–1.25)
EOSINOPHIL # BLD AUTO: 0.1 10E3/UL (ref 0–0.7)
EOSINOPHIL NFR BLD AUTO: 2 %
ERYTHROCYTE [DISTWIDTH] IN BLOOD BY AUTOMATED COUNT: 12.1 % (ref 10–15)
FLUAV RNA SPEC QL NAA+PROBE: NEGATIVE
FLUBV RNA RESP QL NAA+PROBE: NEGATIVE
GFR SERPL CREATININE-BSD FRML MDRD: 82 ML/MIN/1.73M2
GLUCOSE BLD-MCNC: 290 MG/DL (ref 70–99)
HCT VFR BLD AUTO: 43.6 % (ref 40–53)
HGB BLD-MCNC: 14.9 G/DL (ref 13.3–17.7)
IMM GRANULOCYTES # BLD: 0 10E3/UL
IMM GRANULOCYTES NFR BLD: 0 %
LYMPHOCYTES # BLD AUTO: 1.5 10E3/UL (ref 0.8–5.3)
LYMPHOCYTES NFR BLD AUTO: 26 %
MCH RBC QN AUTO: 30.5 PG (ref 26.5–33)
MCHC RBC AUTO-ENTMCNC: 34.2 G/DL (ref 31.5–36.5)
MCV RBC AUTO: 89 FL (ref 78–100)
MONOCYTES # BLD AUTO: 0.4 10E3/UL (ref 0–1.3)
MONOCYTES NFR BLD AUTO: 7 %
NEUTROPHILS # BLD AUTO: 3.7 10E3/UL (ref 1.6–8.3)
NEUTROPHILS NFR BLD AUTO: 65 %
NRBC # BLD AUTO: 0 10E3/UL
NRBC BLD AUTO-RTO: 0 /100
NT-PROBNP SERPL-MCNC: 116 PG/ML (ref 0–1800)
PLATELET # BLD AUTO: 112 10E3/UL (ref 150–450)
POTASSIUM BLD-SCNC: 4.8 MMOL/L (ref 3.4–5.3)
PROT SERPL-MCNC: 7.3 G/DL (ref 6.8–8.8)
RBC # BLD AUTO: 4.88 10E6/UL (ref 4.4–5.9)
SARS-COV-2 RNA RESP QL NAA+PROBE: NEGATIVE
SODIUM SERPL-SCNC: 137 MMOL/L (ref 133–144)
TROPONIN I SERPL HS-MCNC: 72 NG/L
WBC # BLD AUTO: 5.8 10E3/UL (ref 4–11)

## 2021-12-17 PROCEDURE — 84484 ASSAY OF TROPONIN QUANT: CPT | Performed by: STUDENT IN AN ORGANIZED HEALTH CARE EDUCATION/TRAINING PROGRAM

## 2021-12-17 PROCEDURE — 83880 ASSAY OF NATRIURETIC PEPTIDE: CPT | Performed by: STUDENT IN AN ORGANIZED HEALTH CARE EDUCATION/TRAINING PROGRAM

## 2021-12-17 PROCEDURE — 36415 COLL VENOUS BLD VENIPUNCTURE: CPT | Performed by: STUDENT IN AN ORGANIZED HEALTH CARE EDUCATION/TRAINING PROGRAM

## 2021-12-17 PROCEDURE — 99285 EMERGENCY DEPT VISIT HI MDM: CPT | Mod: 25

## 2021-12-17 PROCEDURE — 93010 ELECTROCARDIOGRAM REPORT: CPT | Performed by: STUDENT IN AN ORGANIZED HEALTH CARE EDUCATION/TRAINING PROGRAM

## 2021-12-17 PROCEDURE — 80053 COMPREHEN METABOLIC PANEL: CPT | Performed by: STUDENT IN AN ORGANIZED HEALTH CARE EDUCATION/TRAINING PROGRAM

## 2021-12-17 PROCEDURE — 93005 ELECTROCARDIOGRAM TRACING: CPT

## 2021-12-17 PROCEDURE — 87636 SARSCOV2 & INF A&B AMP PRB: CPT | Performed by: STUDENT IN AN ORGANIZED HEALTH CARE EDUCATION/TRAINING PROGRAM

## 2021-12-17 PROCEDURE — C9803 HOPD COVID-19 SPEC COLLECT: HCPCS

## 2021-12-17 PROCEDURE — 85004 AUTOMATED DIFF WBC COUNT: CPT | Performed by: STUDENT IN AN ORGANIZED HEALTH CARE EDUCATION/TRAINING PROGRAM

## 2021-12-17 PROCEDURE — 71045 X-RAY EXAM CHEST 1 VIEW: CPT

## 2021-12-17 PROCEDURE — 99214 OFFICE O/P EST MOD 30 MIN: CPT | Performed by: PHYSICIAN ASSISTANT

## 2021-12-17 PROCEDURE — 99284 EMERGENCY DEPT VISIT MOD MDM: CPT | Mod: 25 | Performed by: STUDENT IN AN ORGANIZED HEALTH CARE EDUCATION/TRAINING PROGRAM

## 2021-12-17 PROCEDURE — 99207 PR NON-BILLABLE SERV PER CHARTING: CPT | Performed by: PHYSICIAN ASSISTANT

## 2021-12-17 RX ORDER — GUAIFENESIN 600 MG/1
1200 TABLET, EXTENDED RELEASE ORAL 2 TIMES DAILY
Qty: 15 TABLET | Refills: 0 | COMMUNITY
Start: 2021-12-17 | End: 2022-10-06

## 2021-12-17 RX ORDER — BENZONATATE 100 MG/1
100 CAPSULE ORAL 3 TIMES DAILY PRN
Qty: 30 CAPSULE | Refills: 0 | Status: SHIPPED | OUTPATIENT
Start: 2021-12-17 | End: 2022-02-10

## 2021-12-17 NOTE — ED PROVIDER NOTES
History     Chief Complaint   Patient presents with     Hypertension     sent from [facility], hypertension, pt is asymptomatic,  no pain     Cough     HPI  Orville Plasencia is a 76 year old male with documented past medical history which includes obesity, type 2 diabetes mellitus, hyperlipidemia and hypertension who presents to the department from local urgent care for evaluation of cough and elevated blood pressure readings.  The patient describes 6 days of nasal congestion, rhinorrhea, and dry cough.  He is without chest pain or shortness of breath at rest but occasionally becomes dyspneic with exertion.  He admits that wife has similar symptoms of respiratory illness.  He denies fever, chills, headache, sore throat, chest pain, abdominal pain or gastrointestinal symptoms.  Of note, patient has received Covid immunization.    Allergies:  Allergies   Allergen Reactions     Lipitor [Atorvastatin Calcium] Cramps     Leg cramps       Problem List:    Patient Active Problem List    Diagnosis Date Noted     Chronic kidney disease, stage 3b (H) 12/09/2021     Priority: Medium     Type 2 diabetes mellitus with microalbuminuria, with long-term current use of insulin (H) 09/11/2018     Priority: Medium     Hyperlipidemia LDL goal <70 02/23/2016     Priority: Medium     Morbid obesity due to excess calories (H) 02/18/2016     Priority: Medium     Herpes zoster without complication 02/18/2016     Priority: Medium     Type 2 diabetes mellitus with hyperglycemia (H) 10/27/2015     Priority: Medium     Pancreatitis, gallstone 07/29/2014     Priority: Medium     Pseudoaneurysm (H) 02/27/2014     Priority: Medium     Health Care Home 01/08/2014     Priority: Medium       Status:  Accepted  Care Coordinator:  Coco Meadows    See Letters for [facility] Care Plan/Emergency Care Plan               Anemia due to blood loss, acute 10/08/2013     Priority: Medium     Necrotizing pancreatitis 08/24/2013     Priority: Medium      Generalized weakness 08/23/2013     Priority: Medium     Anasarca 08/23/2013     Priority: Medium     GERD (gastroesophageal reflux disease) 07/02/2013     Priority: Medium     Advanced directives, counseling/discussion 01/25/2012     Priority: Medium     Patient does not have an Advance/Health Care Directive (HCD), requests blank HCD form.    Adina Knowles  January 25, 2012         RADHA (obstructive sleep apnea) with REM related hypoventilation-Moderate (AHI 26) 10/03/2010     Priority: Medium     Sensorineural hearing loss 06/02/2006     Priority: Medium     Problem list name updated by automated process. Provider to review          Past Medical History:    Past Medical History:   Diagnosis Date     Anemia      Arrhythmia      Atrial fibrillation (H) 9/17/2013     Chronic infection      Chronic kidney disease      Diabetes mellitus      Difficulty in walking(719.7)      Gastro-oesophageal reflux disease      History of blood transfusion      Hypertension      Obese      Pancreatic disease      Pancreatitis      Sleep apnea      Thrombosis of leg        Past Surgical History:    Past Surgical History:   Procedure Laterality Date     COLONOSCOPY N/A 11/8/2018    Procedure: colonoscopy;  Surgeon: Adrian Chowdary MD;  Location: [facility]     ENDOSCOPIC INSERTION TUBE GASTROSTOMY  9/20/2013    Procedure: ENDOSCOPIC INSERTION TUBE GASTROSTOMY;  Exchange of Gastrojejunostomy Tube , dilation of cyst gastrostomy, endoscopic pancreatocolengiogram with dilation of cyst gastrostomy and stent removal, exchange of cyst duodenostomy stent;  Surgeon: Negro Munguia MD;  [facility]     ENDOSCOPIC INSERTION TUBE JEJUNOSTOMY  8/19/2013    Procedure: ENDOSCOPIC INSERTION TUBE JEJUNOSTOMY;;  Surgeon: Drew Palacios MD;  [facility]     ENDOSCOPIC RETROGRADE CHOLANGIOPANCREATOGRAM  8/19/2013    Procedure: ENDOSCOPIC RETROGRADE CHOLANGIOPANCREATOGRAM;;  Surgeon: Drew Palacios MD;  [facility]     ENDOSCOPIC RETROGRADE  CHOLANGIOPANCREATOGRAM  9/12/2013    Procedure: ENDOSCOPIC RETROGRADE CHOLANGIOPANCREATOGRAM;  Endoscopic Retrograde Cholangiopancreatogram with necrosectomy, dilation of wall of necrosis, stent removal x 4 and gastro-jejunal tube placement.;  Surgeon: Negro Munguia MD;  [facility]     ENDOSCOPIC RETROGRADE CHOLANGIOPANCREATOGRAM  10/7/2013    Procedure: ENDOSCOPIC RETROGRADE CHOLANGIOPANCREATOGRAM;  endoscopic retrograde cholangiopancreatogram, cyst gastrostomy stent exchange, necrosectomy.;  Surgeon: Drew Palacios MD;  [facility]     ENDOSCOPIC RETROGRADE CHOLANGIOPANCREATOGRAM  11/18/2013    Procedure: ENDOSCOPIC RETROGRADE CHOLANGIOPANCREATOGRAM;  endoscopic retrograde cholangiopancreatogram with necrosectomy;  Surgeon: Drew Palacios MD;  [facility]     ENDOSCOPIC RETROGRADE CHOLANGIOPANCREATOGRAM  3/12/2014    Procedure: ENDOSCOPIC RETROGRADE CHOLANGIOPANCREATOGRAM;  Endoscopic Retrograde Cholangiopancreatogram with Stent Placement in Cyst Gastrostomy;  Surgeon: Winter Bowden MD;  [facility]     ENDOSCOPIC RETROGRADE CHOLANGIOPANCREATOGRAPHY, LITHOTRIPSY PANCREAS, COMBINED  9/19/2013    Procedure: COMBINED ENDOSCOPIC RETROGRADE CHOLANGIOPANCREATOGRAPHY, LITHOTRIPSY PANCREAS;  Endoscopic Retrograde Cholangiopancreatogram, placement of 2 stents in cyst gastrostomy;  Surgeon: Drew Palacios MD;  [facility]     ENDOSCOPIC ULTRASOUND UPPER GASTROINTESTINAL TRACT (GI)  7/22/2013    Procedure: ENDOSCOPIC ULTRASOUND UPPER GASTROINTESTINAL TRACT (GI);  Endoscopic Ultrasound;  Surgeon: Drew Palacios MD;  [facility]     ENDOSCOPIC ULTRASOUND UPPER GASTROINTESTINAL TRACT (GI)  8/19/2013    Procedure: ENDOSCOPIC ULTRASOUND UPPER GASTROINTESTINAL TRACT (GI);  Endoscopic ultrasound with fine needle aspiration,  Endorscopic retrograde cholangiopancreatogram with cystgastrostomy,       ENDOSCOPIC ULTRASOUND UPPER GASTROINTESTINAL TRACT (GI)  9/19/2013    Procedure: ENDOSCOPIC  ULTRASOUND UPPER GASTROINTESTINAL TRACT (GI);  Endoscopic Ultrasound of Upper Gastrointestinal Tract;  Surgeon: Drew Palacios MD;  [facility]     ENDOSCOPIC ULTRASOUND UPPER GASTROINTESTINAL TRACT (GI)  8/7/2014    Procedure: ENDOSCOPIC ULTRASOUND, ESOPHAGOSCOPY / UPPER GASTROINTESTINAL TRACT (GI);  Surgeon: Adrian Plaza MD;  [facility]     ESOPHAGOSCOPY, GASTROSCOPY, DUODENOSCOPY (EGD), COMBINED  12/17/2013    Procedure: COMBINED ESOPHAGOSCOPY, GASTROSCOPY, DUODENOSCOPY (EGD);  EGD with stent removal and GJ tube replacement;  Surgeon: Negro Munguia MD;  [facility]     ESOPHAGOSCOPY, GASTROSCOPY, DUODENOSCOPY (EGD), COMBINED  12/14/2013    Procedure: COMBINED ESOPHAGOSCOPY, GASTROSCOPY, DUODENOSCOPY (EGD);;  Surgeon: Winter Bowden MD;  [facility]     ESOPHAGOSCOPY, GASTROSCOPY, DUODENOSCOPY (EGD), COMBINED  7/29/2014    Procedure: COMBINED ESOPHAGOSCOPY, GASTROSCOPY, DUODENOSCOPY (EGD);  Surgeon: Negro Munguia MD;  [facility]     GASTROSTOMY TUBE       IR MISCELLANEOUS PROCEDURE  12/9/2013     LAPAROSCOPIC CHOLECYSTECTOMY  7/29/2014    Procedure: LAPAROSCOPIC CHOLECYSTECTOMY;  Surgeon: Olga Bruner MD;  [facility]     PICC INSERTION  12/20/2013    5fr DL Power PICC, 41cm (1cm external) in the R basilic vein w/ tip in the  mid SVC.     ZZHC COLONOSCOPY THRU STOMA, DIAGNOSTIC  2007    Normal       Family History:    Family History   Problem Relation Age of Onset     Diabetes Mother      Eye Disorder Mother         blind from Diab.     Heart Disease Mother      Obesity Mother      Alcohol/Drug Father      Diabetes Maternal Grandmother      Eye Disorder Brother         Macular Degeneration     Cancer Sister         Brain cancer     C.A.D. Brother         2 bothers 1 sister in 60s     Cancer - colorectal No family hx of        Social History:  Marital Status:   [2]  Social History     Tobacco Use     Smoking status: Never Smoker     Smokeless  tobacco: Never Used   Substance Use Topics     Alcohol use: Not Currently     Comment: rare     Drug use: No        Medications:    benzonatate (TESSALON) 100 MG capsule  guaiFENesin (MUCINEX) 600 MG 12 hr tablet  ACCU-CHEK GUIDE test strip  blood glucose (NO BRAND SPECIFIED) lancets standard  blood glucose (NO BRAND SPECIFIED) test strip  blood glucose calibration (NO BRAND SPECIFIED) solution  blood glucose monitoring (NO BRAND SPECIFIED) meter device kit  furosemide (LASIX) 20 MG tablet  insulin aspart (NOVOLOG FLEXPEN) 100 UNIT/ML pen  insulin glargine (LANTUS SOLOSTAR) 100 UNIT/ML pen  insulin pen needle (RELION PEN NEEDLES) 31G X 6 MM miscellaneous  RELION PEN NEEDLES 31G X 6 MM miscellaneous  STATIN NOT PRESCRIBED, INTENTIONAL,  tamsulosin (FLOMAX) 0.4 MG capsule          Review of Systems  Constitutional:  Negative for fever or chills.  Cardiovascular:  Negative for chest discomfort.  Respiratory: Positive for dry cough.  Gastrointestinal:  Negative for abdominal pain, diarrhea, nausea or vomiting.   Musculoskeletal: Negative for neck or back pain.  Denies recent injury.  Neurological:  Negative for headache or dizziness.    All others reviewed and are negative.      Physical Exam   BP: (!) 208/134  Pulse: 81  Temp: 97  F (36.1  C)  Resp: 16  Weight: 118.4 kg (261 lb)  SpO2: 97 %      Physical Exam  Constitutional:  Well developed, well nourished.  Appears nontoxic and in no acute distress.  Resting comfortably on the gurney.  HENT:  Normocephalic and atraumatic.  Symmetric in appearance.  Eyes:  Conjunctivae are normal.  Neck:  Neck supple.  Cardiovascular:  No cyanosis.  RRR.  No audible murmurs noted.    Respiratory:  Effort normal without sign of respiratory distress.  No audible wheezing or stridor.  CTAB.   Gastrointestinal:  Soft nondistended abdomen.  Nontender and without guarding.  No rigidity or rebound tenderness.  Negative Clancy's sign.  Negative McBurney's point.    Musculoskeletal:  Moves  extremities spontaneously.  Neurological:  Patient is alert.  Skin:  Skin is warm and dry.  Psychiatric:  Normal mood and affect.      ED Course                 Procedures                EKG Interpretation:      Interpreted by: Ti José  Time reviewed: Upon completion    Symptoms at time of EKG: Cough   Rhythm: Sinus  Rate: 79 bpm  Axis: Normal    Conduction: None atypical   ST Segments/ T Waves: No pathologic ST-elevations or T-wave abnormalities.  Q Waves: None  Comparison to prior: Similar morphology to previous     Clinical Impression: No sign of ischemia         Critical Care time:  none               Results for orders placed or performed during the hospital encounter of 12/17/21 (from the past 24 hour(s))   Symptomatic; Unknown Influenza A/B & SARS-CoV2 (COVID-19) Virus PCR Multiplex Nasopharyngeal    Specimen: Nasopharyngeal; Swab   Result Value Ref Range    Influenza A PCR Negative Negative    Influenza B PCR Negative Negative    SARS CoV2 PCR Negative Negative    Narrative    Testing was performed using the tru SARS-CoV-2 & Influenza A/B Assay on the tru [employer] System. This test should be ordered for the detection of SARS-CoV-2 and influenza viruses in individuals who meet clinical and/or epidemiological criteria. Test performance is unknown in asymptomatic patients. This test is for in vitro diagnostic use under the FDA EUA for laboratories certified under CLIA to perform moderate and/or high complexity testing. This test has not been FDA cleared or approved. A negative result does not rule out the presence of PCR inhibitors in the specimen or target RNA in concentration below the limit of detection for the assay. If only one viral target is positive but coinfection with multiple targets is suspected, the sample should be re-tested with another FDA cleared, approved or authorized test, if coinfection would change clinical management. [facility] are certified under the Clinical  Laboratory Improvement Amendments of 1988 (CLIA-88) as  qualified to perform moderate and/or high complexity laboratory testing.   CBC with platelets differential    Narrative    The following orders were created for panel order CBC with platelets differential.  Procedure                               Abnormality         Status                     ---------                               -----------         ------                     CBC with platelets and d...[[id number]]  Abnormal            Final result                 Please view results for these tests on the individual orders.   Comprehensive metabolic panel   Result Value Ref Range    Sodium 137 133 - 144 mmol/L    Potassium 4.8 3.4 - 5.3 mmol/L    Chloride 102 94 - 109 mmol/L    Carbon Dioxide (CO2) 28 20 - 32 mmol/L    Anion Gap 7 3 - 14 mmol/L    Urea Nitrogen 18 7 - 30 mg/dL    Creatinine 0.89 0.66 - 1.25 mg/dL    Calcium 9.0 8.5 - 10.1 mg/dL    Glucose 290 (H) 70 - 99 mg/dL    Alkaline Phosphatase 170 (H) 40 - 150 U/L    AST 31 0 - 45 U/L    ALT 53 0 - 70 U/L    Protein Total 7.3 6.8 - 8.8 g/dL    Albumin 3.3 (L) 3.4 - 5.0 g/dL    Bilirubin Total 0.6 0.2 - 1.3 mg/dL    GFR Estimate 82 >60 mL/min/1.73m2   Troponin I   Result Value Ref Range    Troponin I High Sensitivity 72 <79 ng/L   Nt probnp inpatient (BNP)   Result Value Ref Range    N terminal Pro BNP Inpatient 116 0-1,800 pg/mL   CBC with platelets and differential   Result Value Ref Range    WBC Count 5.8 4.0 - 11.0 10e3/uL    RBC Count 4.88 4.40 - 5.90 10e6/uL    Hemoglobin 14.9 13.3 - 17.7 g/dL    Hematocrit 43.6 40.0 - 53.0 %    MCV 89 78 - 100 fL    MCH 30.5 26.5 - 33.0 pg    MCHC 34.2 31.5 - 36.5 g/dL    RDW 12.1 10.0 - 15.0 %    Platelet Count 112 (L) 150 - 450 10e3/uL    % Neutrophils 65 %    % Lymphocytes 26 %    % Monocytes 7 %    % Eosinophils 2 %    % Basophils 0 %    % Immature Granulocytes 0 %    NRBCs per 100 WBC 0 <1 /100    Absolute Neutrophils 3.7 1.6 - 8.3 10e3/uL    Absolute  Lymphocytes 1.5 0.8 - 5.3 10e3/uL    Absolute Monocytes 0.4 0.0 - 1.3 10e3/uL    Absolute Eosinophils 0.1 0.0 - 0.7 10e3/uL    Absolute Basophils 0.0 0.0 - 0.2 10e3/uL    Absolute Immature Granulocytes 0.0 <=0.4 10e3/uL    Absolute NRBCs 0.0 10e3/uL   XR Chest Port 1 View    Narrative    CHEST ONE VIEW December 17, 2021 2:39 PM     HISTORY: Cough.    COMPARISON: January 19, 2021.      Impression    IMPRESSION: No acute disease.    BHAVNA WADDELL MD         SYSTEM ID:  [id number]       Medications - No data to display    Assessments & Plan (with Medical Decision Making)   Bhavna Plasencia is a 76 year old male who presents to the department for evaluation of congestion and cough, urgent care clinician noted elevated blood pressure reading so decided to send the department for evaluation.  In the department he has clear lung sounds, without signs of respiratory distress or hypoxia.  EKG morphology is similar to previous on file, troponin and BNP within reference range.  Chest radiograph independently reviewed no sign of pulmonary edema or pleural effusion.  Rapid influenza and Covid testing negative.  Based on symptoms and exposure to wife with viral illness, suspect patient is suffering from respiratory illness and elevated blood pressure readings could be related to OTC symptomatic medications.  He does not recall the specific name of cough/cold medication but certainly could have phenylephrine or other stimulant as an active agent.  Recommend Tessalon Perles and guaifenesin only as needed with monitoring blood pressure readings at home, plan to follow-up within 1 week.      Disclaimer:  This note consists of symbols derived from keyboarding, dictation, and/or voice recognition software.  As a result, there may be errors in the script that have gone undetected.  Please consider this when interpreting information found in the chart.        I have reviewed the nursing notes.    I have reviewed the findings, diagnosis,  plan and need for follow up with the patient.       New Prescriptions    BENZONATATE (TESSALON) 100 MG CAPSULE    Take 1 capsule (100 mg) by mouth 3 times daily as needed for cough    GUAIFENESIN (MUCINEX) 600 MG 12 HR TABLET    Take 2 tablets (1,200 mg) by mouth 2 times daily       Final diagnoses:   Cough   Nasal congestion   Elevated blood pressure reading       12/17/2021   [facility] EMERGENCY DEPT     Ti José DO  12/17/21 0610

## 2021-12-17 NOTE — ED NOTES
Patient reports cough, sore throat, fatigue started last weekend. Patient states he took cold medication (2) pills ~0700. BP elevated when in clinic this morning, due to elevated BP sent to ED for further evaluation. No chest pain. SOB with exertion. Fully vaccinated. Wife has been ill recently too. Reports elevated  today. BS usually .

## 2021-12-17 NOTE — ED TRIAGE NOTES
He will go to [facility] ED now for evaluation of extremely high blood pressure and progressive cough.                        Hawk Bolivar PA-C  [facility]

## 2021-12-17 NOTE — PROGRESS NOTES
Assessment & Plan     1. Hypertensive emergency      2. Cough      He will go to [facility] ED now for evaluation of extremely high blood pressure and progressive cough.                 CONRAD Lim [facility] [place]    Subjective     Orville is a 76 year old male who presents to clinic today for the following health issues:  Chief Complaint   Patient presents with     Cough     Pharyngitis     HPI  Gentleman with history of DM but not HTN.   Taking sommer-seltzer cold and cough 4x daily for 4 days. Blood pressure very elevated today in UC. He did not take furosemide today since his feet are not swollen. Coughing for 4 days and getting worse. Glucoses have been normal but high today at 147 fasting. Some shortness of breath but not any increase over the past week. O2 sat at 96% today. Fully COVID vaccinated with boosting 2 months ago.           Review of Systems        Objective    BP (!) 223/118   Pulse 92   Temp 98.2  F (36.8  C) (Oral)   Wt 118.8 kg (261 lb 12.8 oz)   SpO2 96%   BMI 40.61 kg/m    Physical Exam  Vitals and nursing note reviewed.   Constitutional:       General: He is not in acute distress.     Appearance: He is well-developed. He is not diaphoretic.   HENT:      Head: Normocephalic and atraumatic.      Right Ear: Tympanic membrane and external ear normal.      Left Ear: Tympanic membrane and external ear normal.   Eyes:      Pupils: Pupils are equal, round, and reactive to light.   Pulmonary:      Effort: Pulmonary effort is normal. No respiratory distress.      Breath sounds: Normal breath sounds.   Musculoskeletal:      Cervical back: Normal range of motion and neck supple.   Lymphadenopathy:      Cervical: No cervical adenopathy.   Skin:     General: Skin is warm and dry.   Neurological:      Mental Status: He is alert.      Cranial Nerves: No cranial nerve deficit.

## 2021-12-17 NOTE — PATIENT INSTRUCTIONS
Dear Orville Plasencia,    We are sorry you are not feeling well. Based on the responses you provided, it is recommended that you be seen in-person in urgent care so we can better evaluate your symptoms. Please click here to find the nearest urgent care location to you.   You will not be charged for this Visit. Thank you for trusting us with your care.    Asher Karimi PA-C

## 2021-12-21 ENCOUNTER — TELEPHONE (OUTPATIENT)
Dept: FAMILY MEDICINE | Facility: CLINIC | Age: 76
End: 2021-12-21
Payer: COMMERCIAL

## 2021-12-21 NOTE — TELEPHONE ENCOUNTER
Reason for Call:  Medication or medication refill:    Do you use a [facility]?  Name of the pharmacy and phone number for the current request:  [facility] in [place] [phone]    Name of the medication requested: cough meds    Other request: Pt requesting alternative med as cough is not improving    Can we leave a detailed message on this number? YES    Phone number patient can be reached at: Home number on file [phone] (home)    Best Time: any    Call taken on 12/21/2021 at 3:08 PM by Coco Colin

## 2021-12-22 NOTE — TELEPHONE ENCOUNTER
Called pt. No answer. Left message to call back to discuss cough. Was given tessalon and mucinex rx from ed for cough. Pt reports those not working    Valente Banks RN

## 2021-12-23 NOTE — TELEPHONE ENCOUNTER
"Pt's wife, Socorro calling back. States pt has URI, was seen in ER 12/17 & negative for covid, influenza, labs ok, cxr ok. Pt continues to have a loose, non-productive cough. Pt also congested. Per wife, cough/congestion is not worsening, \"just hanging on\". Denies wheezing, SOA, CP, fever. Pt taking tessalon perle, robitussin, tylenol.  Pt's BP today 142/87.  Talked to wife about viral illness & that cough could linger. Instructed to continue with treatment plan: cough meds/tylenol.  Instructed if any new changes or worsening with sx  that pt would need to be seen in UC/ER. Wife verbalized understanding of plan. Will call back next week if not improving.    Bhavani Rasmussen RN      "

## 2022-01-09 DIAGNOSIS — Z79.4 TYPE 2 DIABETES MELLITUS WITH HYPERGLYCEMIA, WITH LONG-TERM CURRENT USE OF INSULIN (H): ICD-10-CM

## 2022-01-09 DIAGNOSIS — E11.65 TYPE 2 DIABETES MELLITUS WITH HYPERGLYCEMIA, WITH LONG-TERM CURRENT USE OF INSULIN (H): ICD-10-CM

## 2022-01-11 DIAGNOSIS — E11.65 TYPE 2 DIABETES MELLITUS WITH HYPERGLYCEMIA, WITH LONG-TERM CURRENT USE OF INSULIN (H): ICD-10-CM

## 2022-01-11 DIAGNOSIS — Z79.4 TYPE 2 DIABETES MELLITUS WITH HYPERGLYCEMIA, WITH LONG-TERM CURRENT USE OF INSULIN (H): ICD-10-CM

## 2022-01-11 RX ORDER — PEN NEEDLE, DIABETIC 32GX 5/32"
NEEDLE, DISPOSABLE MISCELLANEOUS
Qty: 150 EACH | Refills: 3 | Status: SHIPPED | OUTPATIENT
Start: 2022-01-11 | End: 2022-06-08

## 2022-01-11 RX ORDER — BLOOD SUGAR DIAGNOSTIC
STRIP MISCELLANEOUS
Qty: 300 STRIP | Refills: 1 | Status: SHIPPED | OUTPATIENT
Start: 2022-01-11 | End: 2022-10-06

## 2022-01-20 DIAGNOSIS — N40.0 BENIGN NON-NODULAR PROSTATIC HYPERPLASIA: ICD-10-CM

## 2022-01-24 ENCOUNTER — TELEPHONE (OUTPATIENT)
Dept: FAMILY MEDICINE | Facility: CLINIC | Age: 77
End: 2022-01-24
Payer: COMMERCIAL

## 2022-01-24 RX ORDER — TAMSULOSIN HYDROCHLORIDE 0.4 MG/1
CAPSULE ORAL
Qty: 180 CAPSULE | Refills: 0 | OUTPATIENT
Start: 2022-01-24

## 2022-01-27 ENCOUNTER — MYC MEDICAL ADVICE (OUTPATIENT)
Dept: FAMILY MEDICINE | Facility: CLINIC | Age: 77
End: 2022-01-27
Payer: COMMERCIAL

## 2022-01-27 NOTE — TELEPHONE ENCOUNTER
Called pt to f/u on [employer] message. No answer, left non-detailed msg & sent [employer] msg.    Bhavani Rasmussen RN

## 2022-01-28 NOTE — TELEPHONE ENCOUNTER
Reviewed pt's [employer] msg & called pt to f/u.  Pt currently has no sx except states gets weak & tired easliy. BP today 183/89, p70.   Pt agreeable to making appt.  appt made for 2/3/22.  [employer] msg sent to pt to have him continue to check bp daily, record & bring in to his appt.    Bhavani Rasmussen RN

## 2022-02-10 ENCOUNTER — OFFICE VISIT (OUTPATIENT)
Dept: FAMILY MEDICINE | Facility: CLINIC | Age: 77
End: 2022-02-10
Payer: COMMERCIAL

## 2022-02-10 VITALS
SYSTOLIC BLOOD PRESSURE: 156 MMHG | BODY MASS INDEX: 40.16 KG/M2 | TEMPERATURE: 97.7 F | DIASTOLIC BLOOD PRESSURE: 92 MMHG | RESPIRATION RATE: 12 BRPM | WEIGHT: 265 LBS | HEIGHT: 68 IN | OXYGEN SATURATION: 98 % | HEART RATE: 80 BPM

## 2022-02-10 DIAGNOSIS — I10 ESSENTIAL HYPERTENSION: Primary | ICD-10-CM

## 2022-02-10 PROCEDURE — 99213 OFFICE O/P EST LOW 20 MIN: CPT | Performed by: NURSE PRACTITIONER

## 2022-02-10 RX ORDER — LOSARTAN POTASSIUM 25 MG/1
25 TABLET ORAL DAILY
Qty: 30 TABLET | Refills: 3 | Status: SHIPPED | OUTPATIENT
Start: 2022-02-10 | End: 2022-02-17

## 2022-02-10 ASSESSMENT — MIFFLIN-ST. JEOR: SCORE: 1893.59

## 2022-02-10 NOTE — PROGRESS NOTES
"  Assessment & Plan     Essential hypertension  Uncontrolled  - recommend starting Losartan- schedule RN visit in 1 week for blood pressure check- may need to increase losartan from 25 mg to 50 mg if blood pressure is not at goal < 130/90. Patient encouraged to bring his home blood pressure cuff along to RN visit.   - losartan (COZAAR) 25 MG tablet; Take 1 tablet (25 mg) by mouth daily     :[id number]}     BMI:   Estimated body mass index is 40.89 kg/m  as calculated from the following:    Height as of this encounter: 1.715 m (5' 7.5\").    Weight as of this encounter: 120.2 kg (265 lb).   Weight management plan: Discussed healthy diet and exercise guidelines        No follow-ups on file.    JAHAIRA Perdue CNP  [facility]    Jackie Jacinto is a 76 year old who presents for the following health issues  accompanied by his spouse.    HPI     ED/UC Followup:    Facility:  [facility] [place]  Date of visit: 12/17/21  Reason for visit: elevated blood pressure  Current Status: blood pressure are still elevated and having some fatigue- overall he feels like fatigue is no different- he is still able to haul wood/stack wood for 4 hrs at a time.      Patient has been checking Blood pressure at home- reports 170-190's/ 's. Patient denies headaches/ vision changes/ chest pain.     Review of Systems   Constitutional, HEENT, cardiovascular, pulmonary, GI, , musculoskeletal, neuro, skin, endocrine and psych systems are negative, except as otherwise noted.      Objective    There were no vitals taken for this visit.  There is no height or weight on file to calculate BMI.  Physical Exam   GENERAL: healthy, alert and no distress  RESP: lungs clear to auscultation - no rales, rhonchi or wheezes  CV: regular rate and rhythm, normal S1 S2, no S3 or S4, no murmur, click or rub, no peripheral edema and peripheral pulses strong  MS: no gross musculoskeletal defects noted, no edema                "

## 2022-02-17 ENCOUNTER — TELEPHONE (OUTPATIENT)
Dept: FAMILY MEDICINE | Facility: CLINIC | Age: 77
End: 2022-02-17

## 2022-02-17 ENCOUNTER — ALLIED HEALTH/NURSE VISIT (OUTPATIENT)
Dept: FAMILY MEDICINE | Facility: CLINIC | Age: 77
End: 2022-02-17
Payer: COMMERCIAL

## 2022-02-17 VITALS — HEART RATE: 80 BPM | DIASTOLIC BLOOD PRESSURE: 76 MMHG | SYSTOLIC BLOOD PRESSURE: 142 MMHG

## 2022-02-17 DIAGNOSIS — I10 ESSENTIAL HYPERTENSION: ICD-10-CM

## 2022-02-17 DIAGNOSIS — Z01.30 BLOOD PRESSURE CHECK: Primary | ICD-10-CM

## 2022-02-17 PROCEDURE — 99207 PR NO BILLABLE SERVICE THIS VISIT: CPT

## 2022-02-17 RX ORDER — LOSARTAN POTASSIUM 50 MG/1
50 TABLET ORAL DAILY
Qty: 90 TABLET | Refills: 3 | Status: SHIPPED | OUTPATIENT
Start: 2022-02-17 | End: 2022-10-06

## 2022-02-17 NOTE — TELEPHONE ENCOUNTER
Covering for primary/ordering provider    Blood pressure not well controlled, although improved    Increase losartan from 25 to 50 mg daily.  Can take TWO 25 mg pills until gone, then take ONE 50 mg pill - call pharmacy to fill    RN blood pressure check and BMP in 2 weeks.

## 2022-02-17 NOTE — TELEPHONE ENCOUNTER
Patient is here today for B/P check, he is accompanied by his wife Socorro. Socorro checked patient's B/P yesterday at home and was 156/94. Patient is asymptomatic today, tolerating the dosage of 25 mg Losartan fine.     B/P today in clinic: 148/76, 142/76, pulse is 80.    Will route a separate telephone encounter to PCP for review.    JAZMIN Mcghee

## 2022-03-01 ENCOUNTER — TRANSFERRED RECORDS (OUTPATIENT)
Dept: HEALTH INFORMATION MANAGEMENT | Facility: CLINIC | Age: 77
End: 2022-03-01
Payer: COMMERCIAL

## 2022-03-01 LAB — RETINOPATHY: NEGATIVE

## 2022-03-03 ENCOUNTER — ALLIED HEALTH/NURSE VISIT (OUTPATIENT)
Dept: FAMILY MEDICINE | Facility: CLINIC | Age: 77
End: 2022-03-03
Payer: COMMERCIAL

## 2022-03-03 ENCOUNTER — TELEPHONE (OUTPATIENT)
Dept: FAMILY MEDICINE | Facility: CLINIC | Age: 77
End: 2022-03-03

## 2022-03-03 ENCOUNTER — LAB (OUTPATIENT)
Dept: LAB | Facility: CLINIC | Age: 77
End: 2022-03-03
Payer: COMMERCIAL

## 2022-03-03 VITALS — DIASTOLIC BLOOD PRESSURE: 84 MMHG | HEART RATE: 76 BPM | SYSTOLIC BLOOD PRESSURE: 132 MMHG

## 2022-03-03 DIAGNOSIS — I10 ESSENTIAL HYPERTENSION: ICD-10-CM

## 2022-03-03 DIAGNOSIS — I10 ESSENTIAL HYPERTENSION: Primary | ICD-10-CM

## 2022-03-03 LAB
ANION GAP SERPL CALCULATED.3IONS-SCNC: <1 MMOL/L (ref 3–14)
BUN SERPL-MCNC: 18 MG/DL (ref 7–30)
CALCIUM SERPL-MCNC: 9.2 MG/DL (ref 8.5–10.1)
CHLORIDE BLD-SCNC: 107 MMOL/L (ref 94–109)
CO2 SERPL-SCNC: 30 MMOL/L (ref 20–32)
CREAT SERPL-MCNC: 1 MG/DL (ref 0.66–1.25)
GFR SERPL CREATININE-BSD FRML MDRD: 78 ML/MIN/1.73M2
GLUCOSE BLD-MCNC: 178 MG/DL (ref 70–99)
POTASSIUM BLD-SCNC: 5.1 MMOL/L (ref 3.4–5.3)
SODIUM SERPL-SCNC: 137 MMOL/L (ref 133–144)

## 2022-03-03 PROCEDURE — 80048 BASIC METABOLIC PNL TOTAL CA: CPT

## 2022-03-03 PROCEDURE — 36415 COLL VENOUS BLD VENIPUNCTURE: CPT

## 2022-03-03 PROCEDURE — 99207 PR NO CHARGE NURSE ONLY: CPT

## 2022-03-03 NOTE — TELEPHONE ENCOUNTER
Called pt & read providers message. Pt verbalized understanding & will make appt to establish care with new provider.    Bhavani Rasmussen RN

## 2022-03-03 NOTE — TELEPHONE ENCOUNTER
Orville Plasencia is a 76 year old patient who comes in today for a Blood Pressure check because of medication change and ongoing blood pressure monitoring. Losartan increased from 25 mg to 50 mg.   Vital Signs as repeated by RN today: /84; Pulse 76.  Patient is taking medication as prescribed.  Patient is tolerating medications well.  Patient is monitoring Blood Pressure at home, but can't recall readings, thinks it's been 130's/70's-80's.   Current complaints: none  Patient had BMP lab today with results in process.   Disposition:  patient to continue with the same medication and will await lab results.     May Moss RN  [facility]

## 2022-03-03 NOTE — PROGRESS NOTES
Orville Plasencia is a 76 year old patient who comes in today for a Blood Pressure check because of medication change and ongoing blood pressure monitoring. Losartan increased from 25 mg to 50 mg.   Vital Signs as repeated by RN today: /84; Pulse 76.  Patient is taking medication as prescribed.  Patient is tolerating medications well.  Patient is monitoring Blood Pressure at home, but can't recall readings, thinks it's been 130's/70's-80's.   Current complaints: none  Patient had BMP lab today with results in process.   Disposition:  patient to continue with the same medication and will await lab results.     May Moss RN  [facility]

## 2022-03-03 NOTE — TELEPHONE ENCOUNTER
Please continue with the current blood pressure medication.  Please have Orville schedule a visit in 3 months to establish care with a new primary care provider.  Sincerely,  Rich Busby MD

## 2022-03-10 ENCOUNTER — TELEPHONE (OUTPATIENT)
Dept: FAMILY MEDICINE | Facility: CLINIC | Age: 77
End: 2022-03-10
Payer: COMMERCIAL

## 2022-03-11 NOTE — TELEPHONE ENCOUNTER
Prior Authorization Retail Medication Request    Medication/Dose: insulin aspa inj flexpen  ICD code (if different than what is on RX):    Previously Tried and Failed:  Basaglar; glyburide; novolin; novolog flexpen; basaglar; lantus; humalog; humulin; metformin;   Rationale:  Temporary fill letter received    Insurance Name:  [facility]  Insurance ID:  [id number]      Pharmacy Information (if different than what is on RX)  Name:    Phone:

## 2022-03-14 DIAGNOSIS — Z79.4 TYPE 2 DIABETES MELLITUS WITH HYPERGLYCEMIA, WITH LONG-TERM CURRENT USE OF INSULIN (H): ICD-10-CM

## 2022-03-14 DIAGNOSIS — E11.65 TYPE 2 DIABETES MELLITUS WITH HYPERGLYCEMIA, WITH LONG-TERM CURRENT USE OF INSULIN (H): ICD-10-CM

## 2022-03-14 NOTE — TELEPHONE ENCOUNTER
Reason for Call:  Medication or medication refill     NOVOLOG FLEXPEN 100 UNIT    Do you use a [facility]?  Name of the pharmacy and phone number for the current request:  [facility] [place] [address]  Name of the medication requested: NOVOLOG FLEXPEN 100 UNIT    Can we leave a detailed message on this number? YES    Phone number patient can be reached at: Home number on file [phone] (home)    Best Time: ANY    Call taken on 3/14/2022 at 3:42 PM by Maryann Bright

## 2022-03-15 RX ORDER — INSULIN ASPART 100 [IU]/ML
INJECTION, SOLUTION INTRAVENOUS; SUBCUTANEOUS
Qty: 45 ML | Refills: 0 | Status: SHIPPED | OUTPATIENT
Start: 2022-03-15 | End: 2022-10-18

## 2022-03-15 NOTE — TELEPHONE ENCOUNTER
Pending Prescriptions:                       Disp   Refills    insulin aspart (NOVOLOG FLEXPEN) 100 UNIT/*45 mL  3        Sig: Inject  5 to 8units of Novolog before meals based on           carbohydrate amount + pre meal correction (Max           total dose of 13 units each injection for           breakfast, lunch and dinner)    Routing refill request to provider for review/approval because:  PCP no longer with the practice.

## 2022-03-16 NOTE — TELEPHONE ENCOUNTER
PA Initiation    Medication: NOVOLOG flexpen  Insurance Company:    Pharmacy Filling the Rx: [id number] - [place], [place] - [address]  Filling Pharmacy Phone: [phone]  Filling Pharmacy Fax:    Start Date: 3/16/2022

## 2022-03-16 NOTE — TELEPHONE ENCOUNTER
LM on patient VM that refill approved and sent to pharmacy and to [facility] care with a new provider. Geneva Morley on 3/16/2022 at 10:43 AM

## 2022-03-16 NOTE — TELEPHONE ENCOUNTER
Prior Authorization Approval    Authorization Effective Date: 3/16/2022  Authorization Expiration Date: 12/31/2022  Medication: NOVOLOG flexpen  Approved Dose/Quantity: 45   Reference #:     Insurance Company:    Expected CoPay:       CoPay Card Available:      Foundation Assistance Needed:    Which Pharmacy is filling the prescription (Not needed for infusion/clinic administered): [id number] [place] [place] [facility] [address] [address] [address]  Pharmacy Notified: Yes  Patient Notified: YesComment:  PHARMACY WILL NOTIFY WHEN READY

## 2022-06-07 DIAGNOSIS — E11.65 TYPE 2 DIABETES MELLITUS WITH HYPERGLYCEMIA, WITH LONG-TERM CURRENT USE OF INSULIN (H): ICD-10-CM

## 2022-06-07 DIAGNOSIS — Z79.4 TYPE 2 DIABETES MELLITUS WITH HYPERGLYCEMIA, WITH LONG-TERM CURRENT USE OF INSULIN (H): ICD-10-CM

## 2022-06-08 RX ORDER — PEN NEEDLE, DIABETIC 32GX 5/32"
NEEDLE, DISPOSABLE MISCELLANEOUS
Qty: 500 EACH | Refills: 1 | Status: SHIPPED | OUTPATIENT
Start: 2022-06-08 | End: 2023-01-23

## 2022-06-14 NOTE — TELEPHONE ENCOUNTER
Provider covering for Anahi Johnsont,    Please see message below from pharmacy. Emelia ROBERT RN     Never

## 2022-06-29 ENCOUNTER — IMMUNIZATION (OUTPATIENT)
Dept: FAMILY MEDICINE | Facility: CLINIC | Age: 77
End: 2022-06-29
Payer: COMMERCIAL

## 2022-06-29 PROCEDURE — 91305 COVID-19,PF,PFIZER (12+ YRS): CPT

## 2022-06-29 PROCEDURE — 0054A COVID-19,PF,PFIZER (12+ YRS): CPT

## 2022-07-02 ENCOUNTER — HEALTH MAINTENANCE LETTER (OUTPATIENT)
Age: 77
End: 2022-07-02

## 2022-08-01 DIAGNOSIS — N40.0 BENIGN NON-NODULAR PROSTATIC HYPERPLASIA: ICD-10-CM

## 2022-08-01 NOTE — TELEPHONE ENCOUNTER
"Requested Prescriptions   Pending Prescriptions Disp Refills     tamsulosin (FLOMAX) 0.4 MG capsule 180 capsule 1     Sig: Take 2 capsules by mouth once daily       Alpha Blockers Failed - 8/1/2022 10:54 AM        Failed - Recent (12 mo) or future (30 days) visit within the authorizing provider's specialty     Patient has had an office visit with the authorizing provider or a provider within the authorizing providers department within the previous 12 mos or has a future within next 30 days. See \"Patient Info\" tab in inbasket, or \"Choose Columns\" in Meds & Orders section of the refill encounter.              Passed - Blood pressure under 140/90 in past 12 months     BP Readings from Last 3 Encounters:   03/03/22 132/84   02/17/22 (!) 142/76   02/10/22 (!) 156/92                 Passed - Patient does not have Tadalafil, Vardenafil, or Sildenafil on their medication list        Passed - Medication is active on med list        Passed - Patient is 18 years of age or older             "

## 2022-08-03 RX ORDER — TAMSULOSIN HYDROCHLORIDE 0.4 MG/1
CAPSULE ORAL
Qty: 180 CAPSULE | Refills: 0 | Status: SHIPPED | OUTPATIENT
Start: 2022-08-03 | End: 2022-10-06

## 2022-08-03 NOTE — TELEPHONE ENCOUNTER
Message given to patient's wife with good understanding.  Patient's wife made appt with Dr. Michel for 9/7/22. Geneva Morley on 8/3/2022 at 1:19 PM

## 2022-08-03 NOTE — TELEPHONE ENCOUNTER
Covering for primary/ordering provider  Chart refill provided.  Needs to establish care with new primary care provider for ongoing refills

## 2022-10-06 ENCOUNTER — OFFICE VISIT (OUTPATIENT)
Dept: FAMILY MEDICINE | Facility: CLINIC | Age: 77
End: 2022-10-06
Payer: COMMERCIAL

## 2022-10-06 VITALS
SYSTOLIC BLOOD PRESSURE: 142 MMHG | TEMPERATURE: 97.5 F | BODY MASS INDEX: 41.22 KG/M2 | HEIGHT: 68 IN | RESPIRATION RATE: 14 BRPM | OXYGEN SATURATION: 98 % | DIASTOLIC BLOOD PRESSURE: 80 MMHG | WEIGHT: 272 LBS | HEART RATE: 78 BPM

## 2022-10-06 DIAGNOSIS — E11.29 TYPE 2 DIABETES MELLITUS WITH MICROALBUMINURIA, WITH LONG-TERM CURRENT USE OF INSULIN (H): Primary | ICD-10-CM

## 2022-10-06 DIAGNOSIS — Z23 NEED FOR PROPHYLACTIC VACCINATION AND INOCULATION AGAINST INFLUENZA: ICD-10-CM

## 2022-10-06 DIAGNOSIS — N18.32 CHRONIC KIDNEY DISEASE, STAGE 3B (H): ICD-10-CM

## 2022-10-06 DIAGNOSIS — E66.01 MORBID OBESITY DUE TO EXCESS CALORIES (H): ICD-10-CM

## 2022-10-06 DIAGNOSIS — R80.9 TYPE 2 DIABETES MELLITUS WITH MICROALBUMINURIA, WITH LONG-TERM CURRENT USE OF INSULIN (H): Primary | ICD-10-CM

## 2022-10-06 DIAGNOSIS — I10 ESSENTIAL HYPERTENSION: ICD-10-CM

## 2022-10-06 DIAGNOSIS — Z79.4 TYPE 2 DIABETES MELLITUS WITH MICROALBUMINURIA, WITH LONG-TERM CURRENT USE OF INSULIN (H): Primary | ICD-10-CM

## 2022-10-06 DIAGNOSIS — N40.0 BENIGN NON-NODULAR PROSTATIC HYPERPLASIA: ICD-10-CM

## 2022-10-06 DIAGNOSIS — Z23 HIGH PRIORITY FOR 2019-NCOV VACCINE: ICD-10-CM

## 2022-10-06 DIAGNOSIS — Z79.4 TYPE 2 DIABETES MELLITUS WITH DIABETIC PERIPHERAL ANGIOPATHY WITHOUT GANGRENE, WITH LONG-TERM CURRENT USE OF INSULIN (H): ICD-10-CM

## 2022-10-06 DIAGNOSIS — E11.51 TYPE 2 DIABETES MELLITUS WITH DIABETIC PERIPHERAL ANGIOPATHY WITHOUT GANGRENE, WITH LONG-TERM CURRENT USE OF INSULIN (H): ICD-10-CM

## 2022-10-06 DIAGNOSIS — M62.830 BACK MUSCLE SPASM: ICD-10-CM

## 2022-10-06 DIAGNOSIS — E78.5 HYPERLIPIDEMIA LDL GOAL <70: ICD-10-CM

## 2022-10-06 LAB
ANION GAP SERPL CALCULATED.3IONS-SCNC: 10 MMOL/L (ref 7–15)
BUN SERPL-MCNC: 27 MG/DL (ref 8–23)
CALCIUM SERPL-MCNC: 9.6 MG/DL (ref 8.8–10.2)
CHLORIDE SERPL-SCNC: 102 MMOL/L (ref 98–107)
CREAT SERPL-MCNC: 1.11 MG/DL (ref 0.67–1.17)
CREAT UR-MCNC: 83.3 MG/DL
DEPRECATED HCO3 PLAS-SCNC: 25 MMOL/L (ref 22–29)
GFR SERPL CREATININE-BSD FRML MDRD: 68 ML/MIN/1.73M2
GLUCOSE SERPL-MCNC: 151 MG/DL (ref 70–99)
HBA1C MFR BLD: 9.3 % (ref 0–5.6)
MICROALBUMIN UR-MCNC: 19.4 MG/L
MICROALBUMIN/CREAT UR: 23.29 MG/G CR (ref 0–17)
POTASSIUM SERPL-SCNC: 4.9 MMOL/L (ref 3.4–5.3)
SODIUM SERPL-SCNC: 137 MMOL/L (ref 136–145)

## 2022-10-06 PROCEDURE — 83036 HEMOGLOBIN GLYCOSYLATED A1C: CPT | Performed by: FAMILY MEDICINE

## 2022-10-06 PROCEDURE — 91312 COVID-19,PF,PFIZER BOOSTER BIVALENT: CPT | Performed by: FAMILY MEDICINE

## 2022-10-06 PROCEDURE — 99214 OFFICE O/P EST MOD 30 MIN: CPT | Mod: 25 | Performed by: FAMILY MEDICINE

## 2022-10-06 PROCEDURE — 36416 COLLJ CAPILLARY BLOOD SPEC: CPT | Performed by: FAMILY MEDICINE

## 2022-10-06 PROCEDURE — 90662 IIV NO PRSV INCREASED AG IM: CPT | Performed by: FAMILY MEDICINE

## 2022-10-06 PROCEDURE — 0124A COVID-19,PF,PFIZER BOOSTER BIVALENT: CPT | Performed by: FAMILY MEDICINE

## 2022-10-06 PROCEDURE — G0008 ADMIN INFLUENZA VIRUS VAC: HCPCS | Performed by: FAMILY MEDICINE

## 2022-10-06 PROCEDURE — 80048 BASIC METABOLIC PNL TOTAL CA: CPT | Performed by: FAMILY MEDICINE

## 2022-10-06 PROCEDURE — 36415 COLL VENOUS BLD VENIPUNCTURE: CPT | Performed by: FAMILY MEDICINE

## 2022-10-06 PROCEDURE — 82043 UR ALBUMIN QUANTITATIVE: CPT | Performed by: FAMILY MEDICINE

## 2022-10-06 RX ORDER — LOSARTAN POTASSIUM 50 MG/1
50 TABLET ORAL DAILY
Qty: 90 TABLET | Refills: 3 | Status: SHIPPED | OUTPATIENT
Start: 2022-10-06 | End: 2022-10-24

## 2022-10-06 RX ORDER — BLOOD SUGAR DIAGNOSTIC
STRIP MISCELLANEOUS
Qty: 300 STRIP | Refills: 1 | Status: SHIPPED | OUTPATIENT
Start: 2022-10-06 | End: 2023-01-23

## 2022-10-06 RX ORDER — FUROSEMIDE 20 MG
20 TABLET ORAL DAILY PRN
Qty: 90 TABLET | Refills: 1 | Status: SHIPPED | OUTPATIENT
Start: 2022-10-06 | End: 2022-12-29

## 2022-10-06 RX ORDER — INSULIN GLARGINE 100 [IU]/ML
INJECTION, SOLUTION SUBCUTANEOUS
Qty: 45 ML | Refills: 4 | Status: SHIPPED | OUTPATIENT
Start: 2022-10-06 | End: 2022-10-18

## 2022-10-06 RX ORDER — TAMSULOSIN HYDROCHLORIDE 0.4 MG/1
CAPSULE ORAL
Qty: 180 CAPSULE | Refills: 0 | Status: SHIPPED | OUTPATIENT
Start: 2022-10-06 | End: 2022-12-29

## 2022-10-06 RX ORDER — CYCLOBENZAPRINE HCL 10 MG
10 TABLET ORAL
Qty: 30 TABLET | Refills: 0 | Status: SHIPPED | OUTPATIENT
Start: 2022-10-06 | End: 2022-12-29

## 2022-10-06 ASSESSMENT — PAIN SCALES - GENERAL: PAINLEVEL: MODERATE PAIN (4)

## 2022-10-06 NOTE — PROGRESS NOTES
Assessment & Plan     Type 2 diabetes mellitus with microalbuminuria, with long-term current use of insulin (H)  A1C went up significantly , recommend diabetic education. Needs to adjust diet.   - Hemoglobin A1c    Essential hypertension  Blood pressure was slightly high. Recommend recheck in two weeks.  - Basic metabolic panel  (Ca, Cl, CO2, Creat, Gluc, K, Na, BUN); Future  - Basic metabolic panel  (Ca, Cl, CO2, Creat, Gluc, K, Na, BUN)  - losartan (COZAAR) 50 MG tablet; Take 1 tablet (50 mg) by mouth daily      Chronic kidney disease, stage 3b (H)  Stable , no change in the creatinine levels   - furosemide (LASIX) 20 MG tablet; Take 1 tablet (20 mg) by mouth daily as needed (edema in legs)      Need for prophylactic vaccination and inoculation against influenza  - ADMIN INFLUENZA (For MEDICARE Patients ONLY) []      Hyperlipidemia LDL goal <70  Patient will be notified of results.  - Lipid panel reflex to direct LDL Fasting      Type 2 diabetes mellitus with diabetic peripheral angiopathy without gangrene, with long-term current use of insulin (H)  Diabetes uncontrolled.       Morbid obesity due to excess calories (H)  Recommend to start working on diet    Back muscle spasm  muscle relaxant faxed for back pain  - cyclobenzaprine (FLEXERIL) 10 MG tablet; Take 1 tablet (10 mg) by mouth nightly as needed for muscle spasms      Benign non-nodular prostatic hyperplasia  - tamsulosin (FLOMAX) 0.4 MG capsule; Take 2 capsules by mouth once daily    High priority for 2019-nCoV vaccine  - COVID-19,PF,PFIZER BOOSTER BIVALENT 12+Yrs    Review of prior external note(s) from - internal records  Discussion of management or test interpretation with external physician/other qualified healthcare professional/appropriate source - from [facility]  Ordering of each unique test  Prescription drug management  30 minutes spent on the date of the encounter doing chart review, history and exam, documentation and further activities per  "the note  [id number]}     BMI:   Estimated body mass index is 41.97 kg/m  as calculated from the following:    Height as of this encounter: 1.715 m (5' 7.5\").    Weight as of this encounter: 123.4 kg (272 lb).   Weight management plan: Discussed healthy diet and exercise guidelines  Blood sugar testing frequency justification:  Uncontrolled diabetes and Adjustment of medication(s)  FUTURE APPOINTMENTS:       - Follow-up visit in 3 months    Return in about 3 months (around 1/6/2023) for Follow up.    Rama Michel MD  [facility]    Jackie Jacinto is a 77 year old accompanied by his spouse, presenting for the following health issues:    77 yr old male here to establish care.   He has a complex past history significant for necrotizing pancreatitis, obesity,Type ii diabetes on insulin, hypertension ,chronic back pain, hyperlipidemia. He is accompanied by his spouse today. Reports acute on chronic back pain. Was cutting wood and started experiencing sharp low back pain. He has been applying heat and ice. This is helping some.   Discussed diabetes with patient. A1C is much higher. He is on meal time insulin and a long acting insulin. He did say he has not been compliant with his medications.            Establish Care (Previously seeing Dr. Sky. ), Recheck Medication, Back Pain, Imm/Inj (Flu Shot), and Imm/Inj (COVID-19 VACCINE)      History of Present Illness       Back Pain:  He presents for follow up of back pain. Patient's back pain is a new problem.    Original cause of back pain: not sure  First noticed back pain: in the last week  Patient feels back pain: constantlyLocation of back pain:  Right lower back and left lower back  Description of back pain: cramping, sharp and shooting  Back pain spreads: nowhere    Since patient first noticed back pain, pain is: always present, but gets better and worse  Does back pain interfere with his job:  Not applicable  On a scale of 1-10 (10 " "being the worst), patient describes pain as:  8  What makes back pain worse: certain positions  Acupuncture: not tried  Acetaminophen: not tried  Activity or exercise: not helpful  Chiropractor:  Not tried  Cold: not tried  Heat: not tried  Massage: not tried  Muscle relaxants: not tried  NSAIDS: helpful  Opioids: not tried  Physical Therapy: not tried  Rest: helpful  Steroid Injection: not tried  Stretching: not tried  Surgery: not tried  TENS unit: not tried  Topical pain relievers: helpful  Other healthcare providers patient is seeing for back pain: None    Diabetes:   He presents for follow up of diabetes.  He is checking home blood glucose three times daily. He checks blood glucose before meals.  Blood glucose is sometimes over 200 and never under 70. He is aware of hypoglycemia symptoms including shakiness and dizziness. He is concerned about blood sugar frequently over 200.  He is having weight gain.         Reason for visit:  Thorough checkup - diabetes, back , covid booster, flu shot    He eats 2-3 servings of fruits and vegetables daily.He consumes 0 sweetened beverage(s) daily.He exercises with enough effort to increase his heart rate 20 to 29 minutes per day.  He exercises with enough effort to increase his heart rate 4 days per week.   He is taking medications regularly.         Review of Systems   Constitutional: Negative.    HENT: Negative.    Eyes: Negative.    Respiratory: Negative.    Cardiovascular: Negative.    Gastrointestinal: Negative.    Endocrine: Negative.    Genitourinary: Negative.    Musculoskeletal: Positive for back pain.   Skin: Negative.    Allergic/Immunologic: Negative.    Neurological: Negative.    Hematological: Negative.    Psychiatric/Behavioral: Negative.             Objective    BP (!) 142/80   Pulse 78   Temp 97.5  F (36.4  C) (Tympanic)   Resp 14   Ht 1.715 m (5' 7.5\")   Wt 123.4 kg (272 lb)   SpO2 98%   BMI 41.97 kg/m    Body mass index is 41.97 kg/m .  Physical " Exam  Constitutional:       Appearance: Normal appearance. He is obese.   HENT:      Head: Normocephalic and atraumatic.      Right Ear: Tympanic membrane normal.      Left Ear: Tympanic membrane normal.      Nose: Nose normal.   Eyes:      Pupils: Pupils are equal, round, and reactive to light.   Cardiovascular:      Rate and Rhythm: Normal rate and regular rhythm.      Pulses: Normal pulses.      Heart sounds: Normal heart sounds.   Pulmonary:      Effort: Pulmonary effort is normal.      Breath sounds: Normal breath sounds.   Abdominal:      General: Abdomen is flat. Bowel sounds are normal. There is no distension.      Palpations: Abdomen is soft. There is no mass.      Tenderness: There is no abdominal tenderness. There is no right CVA tenderness, left CVA tenderness, guarding or rebound.      Hernia: No hernia is present.   Musculoskeletal:         General: Tenderness present. Normal range of motion.      Cervical back: Normal range of motion.   Skin:     General: Skin is warm and dry.   Neurological:      Mental Status: He is alert and oriented to person, place, and time.   Psychiatric:         Mood and Affect: Mood normal.         Behavior: Behavior normal.            Results for orders placed or performed in visit on 10/06/22   Basic metabolic panel  (Ca, Cl, CO2, Creat, Gluc, K, Na, BUN)     Status: Abnormal   Result Value Ref Range    Sodium 137 136 - 145 mmol/L    Potassium 4.9 3.4 - 5.3 mmol/L    Chloride 102 98 - 107 mmol/L    Carbon Dioxide (CO2) 25 22 - 29 mmol/L    Anion Gap 10 7 - 15 mmol/L    Urea Nitrogen 27.0 (H) 8.0 - 23.0 mg/dL    Creatinine 1.11 0.67 - 1.17 mg/dL    Calcium 9.6 8.8 - 10.2 mg/dL    Glucose 151 (H) 70 - 99 mg/dL    GFR Estimate 68 >60 mL/min/1.73m2   Albumin Random Urine Quantitative with Creat Ratio     Status: Abnormal   Result Value Ref Range    Albumin Urine mg/L 19.4 mg/L    Albumin Urine mg/g Cr 23.29 (H) 0.00 - 17.00 mg/g Cr    Creatinine Urine mg/dL 83.3 mg/dL    Hemoglobin A1c     Status: Abnormal   Result Value Ref Range    Hemoglobin A1C 9.3 (H) 0.0 - 5.6 %

## 2022-10-06 NOTE — LETTER
January 9, 2023      Orville Plasencia  [address] [address] [place] [id number]        Dear ,    We are writing to inform you of your test results.    Cholesterol is in goal.    Resulted Orders   Basic metabolic panel  (Ca, Cl, CO2, Creat, Gluc, K, Na, BUN)   Result Value Ref Range    Sodium 137 136 - 145 mmol/L    Potassium 4.9 3.4 - 5.3 mmol/L    Chloride 102 98 - 107 mmol/L    Carbon Dioxide (CO2) 25 22 - 29 mmol/L    Anion Gap 10 7 - 15 mmol/L    Urea Nitrogen 27.0 (H) 8.0 - 23.0 mg/dL    Creatinine 1.11 0.67 - 1.17 mg/dL    Calcium 9.6 8.8 - 10.2 mg/dL    Glucose 151 (H) 70 - 99 mg/dL    GFR Estimate 68 >60 mL/min/1.73m2      Comment:      Effective December 21, 2021 eGFRcr in adults is calculated using the 2021 CKD-EPI creatinine equation which includes age and gender (Sheri et al., [facility], DOI: 10.1056/OGMMgy6852691)   Albumin Random Urine Quantitative with Creat Ratio   Result Value Ref Range    Albumin Urine mg/L 19.4 mg/L      Comment:      The reference ranges have not been established in urine albumin. The results should be integrated into the clinical context for interpretation.    Albumin Urine mg/g Cr 23.29 (H) 0.00 - 17.00 mg/g Cr      Comment:      Microalbuminuria is defined as an albumin:creatinine ratio of 17 to 299 for males and 25 to 299 for females. A ratio of albumin:creatinine of 300 or higher is indicative of overt proteinuria.  Due to biologic variability, positive results should be confirmed by a second, first-morning random or 24-hour timed urine specimen. If there is discrepancy, a third specimen is recommended. When 2 out of 3 results are in the microalbuminuria range, this is evidence for incipient nephropathy and warrants increased efforts at glucose control, blood pressure control, and institution of therapy with an angiotensin-converting-enzyme (ACE) inhibitor (if the patient can tolerate it).      Creatinine Urine mg/dL 83.3 mg/dL      Comment:      The reference ranges have  not been established in urine creatinine. The results should be integrated into the clinical context for interpretation.   Lipid panel reflex to direct LDL Non-fasting   Result Value Ref Range    Cholesterol 125 <200 mg/dL    Triglycerides 133 <150 mg/dL    Direct Measure HDL 43 >=40 mg/dL    LDL Cholesterol Calculated 55 <=100 mg/dL    Non HDL Cholesterol 82 <130 mg/dL    Narrative    Cholesterol  Desirable:  <200 mg/dL    Triglycerides  Normal:  Less than 150 mg/dL  Borderline High:  150-199 mg/dL  High:  200-499 mg/dL  Very High:  Greater than or equal to 500 mg/dL    Direct Measure HDL  Female:  Greater than or equal to 50 mg/dL   Male:  Greater than or equal to 40 mg/dL    LDL Cholesterol  Desirable:  <100mg/dL  Above Desirable:  100-129 mg/dL   Borderline High:  130-159 mg/dL   High:  160-189 mg/dL   Very High:  >= 190 mg/dL    Non HDL Cholesterol  Desirable:  130 mg/dL  Above Desirable:  130-159 mg/dL  Borderline High:  160-189 mg/dL  High:  190-219 mg/dL  Very High:  Greater than or equal to 220 mg/dL   Hemoglobin A1c   Result Value Ref Range    Hemoglobin A1C 9.3 (H) 0.0 - 5.6 %       If you have any questions or concerns, please call the clinic at the number listed above.       Sincerely,      Rama Michel MD

## 2022-10-10 ASSESSMENT — ENCOUNTER SYMPTOMS
PSYCHIATRIC NEGATIVE: 1
CARDIOVASCULAR NEGATIVE: 1
RESPIRATORY NEGATIVE: 1
BACK PAIN: 1
NEUROLOGICAL NEGATIVE: 1
ENDOCRINE NEGATIVE: 1
GASTROINTESTINAL NEGATIVE: 1
EYES NEGATIVE: 1
HEMATOLOGIC/LYMPHATIC NEGATIVE: 1
ALLERGIC/IMMUNOLOGIC NEGATIVE: 1
CONSTITUTIONAL NEGATIVE: 1

## 2022-10-18 ENCOUNTER — VIRTUAL VISIT (OUTPATIENT)
Dept: EDUCATION SERVICES | Facility: CLINIC | Age: 77
End: 2022-10-18
Attending: FAMILY MEDICINE
Payer: COMMERCIAL

## 2022-10-18 DIAGNOSIS — R80.9 TYPE 2 DIABETES MELLITUS WITH MICROALBUMINURIA, WITH LONG-TERM CURRENT USE OF INSULIN (H): ICD-10-CM

## 2022-10-18 DIAGNOSIS — E11.65 TYPE 2 DIABETES MELLITUS WITH HYPERGLYCEMIA, WITH LONG-TERM CURRENT USE OF INSULIN (H): Primary | ICD-10-CM

## 2022-10-18 DIAGNOSIS — Z79.4 TYPE 2 DIABETES MELLITUS WITH HYPERGLYCEMIA, WITH LONG-TERM CURRENT USE OF INSULIN (H): Primary | ICD-10-CM

## 2022-10-18 DIAGNOSIS — Z79.4 TYPE 2 DIABETES MELLITUS WITH MICROALBUMINURIA, WITH LONG-TERM CURRENT USE OF INSULIN (H): ICD-10-CM

## 2022-10-18 DIAGNOSIS — E11.29 TYPE 2 DIABETES MELLITUS WITH MICROALBUMINURIA, WITH LONG-TERM CURRENT USE OF INSULIN (H): ICD-10-CM

## 2022-10-18 PROCEDURE — 98968 PH1 ASSMT&MGMT NQHP 21-30: CPT | Performed by: DIETITIAN, REGISTERED

## 2022-10-18 RX ORDER — INSULIN GLARGINE 100 [IU]/ML
INJECTION, SOLUTION SUBCUTANEOUS
Qty: 45 ML | Refills: 4 | Status: SHIPPED | OUTPATIENT
Start: 2022-10-18 | End: 2022-11-15

## 2022-10-18 RX ORDER — INSULIN ASPART 100 [IU]/ML
INJECTION, SOLUTION INTRAVENOUS; SUBCUTANEOUS
Qty: 45 ML | Refills: 0 | Status: SHIPPED | OUTPATIENT
Start: 2022-10-18 | End: 2022-11-15

## 2022-10-18 NOTE — LETTER
10/18/2022         RE: Orville Plasencia  [id number] [address] [facility] [id number]        Dear Colleague,    Thank you for referring your patient, Orville Plasencia, to the [facility]. Please see a copy of my visit note below.    Diabetes Self-Management Education & Support    Presents for: Individual review    Type of Service: Telephone Visit    Originating Location (Patient Location): Home  Distant Location (Provider Location): Home  Mode of Communication:  Telephone    Telephone Visit Start Time: 9:00  Telephone Visit End Time (telephone visit stop time): 9:25    How would patient like to obtain AVS? MyChart    Assessment Type:   ASSESSMENT:  Orville reports his BG are improving over the last few weeks, he is working on diet, taking meds consistently and staying active. His fasting BG are all at goal, some on the low side of normal. As the day goes on BG increase, we discussed diet and also needing to adjust insulin. Increased mealtime insulin to 8u (was taking 7), increased correction scale 1u per 30 >140 and reduced Lantus 27/24. He will follow up next month.    Patient's most recent   Lab Results   Component Value Date    A1C 9.3 10/06/2022    A1C 8.7 05/14/2021     is not meeting goal of <8.0    Diabetes knowledge and skills assessment:   Patient is knowledgeable in diabetes management concepts related to: Being Active, Monitoring and Taking Medication    Continue education with the following diabetes management concepts: Healthy Eating, Being Active, Monitoring, Taking Medication, Problem Solving, Reducing Risks and Healthy Coping    Based on learning assessment above, most appropriate setting for further diabetes education would be: Individual setting.      PLAN  1. Increase Novolog to 8u with meals (+ sliding scale if needed), 9u if having cereal or baked potato at breakfast  Sliding scale with meals  140-169  1 units  170-199  2 units  200-229 3 units  230-259  4 units  260-289  5  "units  290-319 6 units  320-349 7 units  350+ 8 units    2. Lantus dose: 27u AM/24u bedtime  3. Check blood sugars before all meals  4. If you have any low blood sugars please reach out via [email or web address]  5. Follow up next month.      Topics to cover at upcoming visits: Healthy Eating, Being Active, Monitoring, Taking Medication and Problem Solving    Follow-up: November    See Care Plan for co-developed, patient-state behavior change goals.  AVS provided for patient today.    Education Materials Provided:  No new materials provided today      SUBJECTIVE/OBJECTIVE:  Presents for: Individual review  Accompanied by: Self, Spouse  Diabetes education in the past 24mo: Yes  Focus of Visit: Other  Diabetes type: Type 2  Disease course: Worsening  Other concerns:: None  Cultural Influences/Ethnic Background:  Choose not to answer    Diabetes Symptoms & Complications:   Patient Problem List and Family Medical History reviewed for relevant medical history, current medical status, and diabetes risk factors.    Vitals:  There were no vitals taken for this visit.  Estimated body mass index is 41.97 kg/m  as calculated from the following:    Height as of 10/6/22: 1.715 m (5' 7.5\").    Weight as of 10/6/22: 123.4 kg (272 lb).   Last 3 BP:   BP Readings from Last 3 Encounters:   10/06/22 (!) 142/80   03/03/22 132/84   02/17/22 (!) 142/76       History   Smoking Status     Never   Smokeless Tobacco     Never       Labs:  Lab Results   Component Value Date    A1C 9.3 10/06/2022    A1C 8.7 05/14/2021     Lab Results   Component Value Date     10/06/2022     03/03/2022     05/06/2021     Lab Results   Component Value Date    LDL 55 05/06/2021     HDL Cholesterol   Date Value Ref Range Status   05/06/2021 50 >39 mg/dL Final   ]  GFR Estimate   Date Value Ref Range Status   10/06/2022 68 >60 mL/min/1.73m2 Final     Comment:     Effective December 21, 2021 eGFRcr in adults is calculated using the 2021 CKD-EPI creatinine " equation which includes age and gender (Sheri diop al., [place], DOI: 10.1056/JBRZge6937956)   05/06/2021 75 >60 mL/min/[1.73_m2] Final     Comment:     Non  GFR Calc  Starting 12/18/2018, serum creatinine based estimated GFR (eGFR) will be   calculated using the Chronic Kidney Disease Epidemiology Collaboration   (CKD-EPI) equation.       GFR Estimate If Black   Date Value Ref Range Status   05/06/2021 87 >60 mL/min/[1.73_m2] Final     Comment:      GFR Calc  Starting 12/18/2018, serum creatinine based estimated GFR (eGFR) will be   calculated using the Chronic Kidney Disease Epidemiology Collaboration   (CKD-EPI) equation.       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 : Yes  Times checking blood sugar at home (number): 3  Blood glucose trend: Decreasing (reports numbers are getting better, trying to eat less and more active)  Date Breakfast  Lunch  Dinner  Bedtime    Before After Before After Before After    10/14 113  129  171     10/15 79    288     10/16 88  305  240     10/17 76    230     10/18 112         *didn't take insulin for lunch 10/17 and 10/15  *cocowheat cereal (50g cho OR 1 cup dry oatmeal ( OR eggs + toast OR potatoes  *cornmeal mush 10/14 for breakfast  Taking Medications:  Diabetes Medication(s)     Insulin        insulin aspart (NOVOLOG FLEXPEN) 100 UNIT/ML pen    Inject  5 to 8units of Novolog before meals based on carbohydrate amount + pre meal correction (Max total dose of 13 units each injection for breakfast, lunch and dinner). ESTABLISH CARE WITH NEW PCP.     insulin glargine (LANTUS SOLOSTAR) 100 UNIT/ML pen    INJECT 27 UNITS SUBCUTANEOUSLY TWICE DAILY INCREASE  BY  3  UNITS  EVERY  4-5  DAYS  UNTIL  GLUCOSE  IS  UNDER  150          Taking Medication Assessed Today: Yes  Current Treatments: Diet, Insulin Injections  Dose schedule: Pre-breakfast, Pre-lunch, Pre-dinner, At bedtime  Given by: Patient  Problems taking diabetes medications regularly?: Yes (sometimes misses shots)  Diabetes medication side effects?: No    Problem Solving:  Problem Solving Assessed Today: Yes  Is the patient at risk for hypoglycemia?: Yes  Hypoglycemia Frequency: Rarely (reports it has been a while, last BG low was 68)    Reducing Risks:  Reducing Risks Assessed Today: No    Healthy Coping:  Healthy Coping Assessed Today: Yes  Stage of change: ACTION (Actively working towards change)  Patient Activation Measure Survey Score:  NILSON Score (Last Two) 11/19/2010   NILSON Raw Score 37   Activation Score 49.9   NILSON Level 2         Care Plan and Education Provided:  Care Plan: Diabetes   Updates made by Cira Tolliver since 10/18/2022 12:00 AM      Problem: HbA1C Not In Goal       Goal: Establish Regular Follow-Ups with PCP       Task: Discuss with PCP the recommended timing for patient's next follow up visit(s)    Responsible User: Cira Tolliver      Task: Discuss schedule for PCP visits with patient    Responsible User: Cira Tolliver      Goal: Get HbA1C Level in Goal       Task: Educate patient on diabetes education self-management topics    Responsible User: Cira Tolliver      Task: Educate patient on benefits of regular glucose monitoring Completed 10/18/2022   Responsible User: Cira Tolliver      Task: Refer patient to appropriate  extended care team member, as needed (Medication Therapy Management, Behavioral Health, Physical Therapy, etc.)    Responsible User: Cira Tolliver      Task: Discuss diabetes treatment plan with patient    Responsible User: Cira Tolliver      Problem: Diabetes Self-Management Education Needed to Optimize Self-Care Behaviors       Goal: Understand diabetes pathophysiology and disease progression       Task: Provide education on diabetes pathophysiology and disease progression specfic to patient's diabetes type    Responsible User: Cira Tolliver      Goal: Healthy Eating - follow a healthy eating pattern for diabetes       Task: Provide education on portion control and consistency in amount, composition and timing of food intake    Responsible User: Cira Tolliver      Task: Provide education on managing carbohydrate intake (carbohydrate counting, plate planning method, etc.) Completed 10/18/2022   Responsible User: Cira Tolliver      Task: Provide education on weight management    Responsible User: Cira Tolliver      Task: Provide education on heart healthy eating    Responsible User: Cira Tolliver      Task: Provide education on eating out    Responsible User: Cira Tolliver      Task: Develop individualized healthy eating plan with patient    Responsible User: Cira Tolliver      Goal: Being Active - get regular physical activity, working up to at least 150 minutes per week       Task: Provide education on relationship of activity to glucose and precautions to take if at risk for low glucose    Responsible User: Cira Tolliver      Task: Discuss barriers to physical activity with patient    Responsible User: Cira Tolliver      Task: Develop physical activity plan with patient    Responsible User: Cira Tolliver      Task: Explore community resources including walking groups, assistance programs, and home videos    Responsible User: Cira Tolliver      Goal: Monitoring - monitor glucose and  ketones as directed       Task: Provide education on blood glucose monitoring (purpose, proper technique, frequency, glucose targets, interpreting results, when to use glucose control solution, sharps disposal) Completed 10/18/2022   Responsible User: Cira Tolliver      Task: Provide education on continuous glucose monitoring (sensor placement, use of lizett or /reader, understanding glucose trends, alerts and alarms, differences between sensor glucose and blood glucose)    Responsible User: Cira Tolliver   Note:    Discussed possibly starting a CGM, he is not interested in this.     Task: Provide education on ketone monitoring (when to monitor, frequency, etc.)    Responsible User: Cira Tolliver      Goal: Taking Medication - patient is consistently taking medications as directed    This Visit's Progress: 60%   Note:    I will take my insulin as prescribed     Task: Provide education on action of prescribed medication, including when to take and possible side effects    Responsible User: Cira Tolliver      Task: Provide education on insulin and injectable diabetes medications, including administration, storage, site selection and rotation for injection sites    Responsible User: Cira Tolliver      Task: Discuss barriers to medication adherence with patient and provide management technique ideas as appropriate    Responsible User: Cira Tolliver      Task: Provide education on frequency and refill details of medications    Responsible User: Cira Tolliver      Goal: Problem Solving - know how to prevent and manage short-term diabetes complications       Task: Provide education on high blood glucose - causes, signs/symptoms, prevention and treatment    Responsible User: Cira Tolliver      Task: Provide education on low blood glucose - causes, signs/symptoms, prevention, treatment, carrying a carbohydrate source at all times, and medical identification Completed 10/18/2022   Responsible User:  Cira Tolliver      Task: Provide education on safe travel with diabetes    Responsible User: Cira Tolliver      Task: Provide education on how to care for diabetes on sick days    Responsible User: Cira Tolliver      Task: Provide education on when to call a health care provider    Responsible User: Cira Tolliver      Goal: Reducing Risks - know how to prevent and treat long-term diabetes complications       Task: Provide education on major complications of diabetes, prevention, early diagnostic measures and treatment of complications    Responsible User: Cira Tolliver      Task: Provide education on recommended care for dental, eye and foot health    Responsible User: Cira Tolliver      Task: Provide education on Hemoglobin A1c - goals and relationship to blood glucose levels    Responsible User: Cira Tolliver      Task: Provide education on recommendations for heart health - lipid levels and goals, blood pressure and goals, and aspirin therapy, if indicated    Responsible User: Cira Tolliver      Task: Provide education on tobacco cessation    Responsible User: Cira Tolliver      Goal: Healthy Coping - use available resources to cope with the challenges of managing diabetes       Task: Discuss recognizing feelings about having diabetes    Responsible User: Cira Tolliver      Task: Provide education on the benefits of making appropriate lifestyle changes    Responsible User: Cira Tolliver      Task: Provide education on benefits of utilizing support systems    Responsible User: Cira Tolliver      Task: Discuss methods for coping with stress    Responsible User: Cira Tolliver      Task: Provide education on when to seek professional counseling    Responsible User: Cira Tolliver RD, LD, [facility]        Time Spent: 25 minutes  Encounter Type: Individual    Any diabetes medication dose changes were made via the CDE Protocol per the patient's primary care provider. A copy  of this encounter was shared with the provider.

## 2022-10-18 NOTE — PATIENT INSTRUCTIONS
PLAN  1. Increase Novolog to 8u with meals (+ sliding scale if needed), 9u if having cereal or baked potato at breakfast  Sliding scale with meals  140-169  1 units  170-199  2 units  200-229 3 units  230-259  4 units  260-289  5 units  290-319 6 units  320-349 7 units  350+ 8 units    2. Lantus dose: 27u AM/24u bedtime  3. Check blood sugars before all meals  4. If you have any low blood sugars please reach out via [employer]  5. Follow up next month.

## 2022-10-18 NOTE — PROGRESS NOTES
Diabetes Self-Management Education & Support    Presents for: Individual review    Type of Service: Telephone Visit    Originating Location (Patient Location): Home  Distant Location (Provider Location): Home  Mode of Communication:  Telephone    Telephone Visit Start Time: 9:00  Telephone Visit End Time (telephone visit stop time): 9:25    How would patient like to obtain AVS? MyChart    Assessment Type:   ASSESSMENT:  Orville reports his BG are improving over the last few weeks, he is working on diet, taking meds consistently and staying active. His fasting BG are all at goal, some on the low side of normal. As the day goes on BG increase, we discussed diet and also needing to adjust insulin. Increased mealtime insulin to 8u (was taking 7), increased correction scale 1u per 30 >140 and reduced Lantus 27/24. He will follow up next month.    Patient's most recent   Lab Results   Component Value Date    A1C 9.3 10/06/2022    A1C 8.7 05/14/2021     is not meeting goal of <8.0    Diabetes knowledge and skills assessment:   Patient is knowledgeable in diabetes management concepts related to: Being Active, Monitoring and Taking Medication    Continue education with the following diabetes management concepts: Healthy Eating, Being Active, Monitoring, Taking Medication, Problem Solving, Reducing Risks and Healthy Coping    Based on learning assessment above, most appropriate setting for further diabetes education would be: Individual setting.      PLAN  1. Increase Novolog to 8u with meals (+ sliding scale if needed), 9u if having cereal or baked potato at breakfast  Sliding scale with meals  140-169  1 units  170-199  2 units  200-229 3 units  230-259  4 units  260-289  5 units  290-319 6 units  320-349 7 units  350+ 8 units    2. Lantus dose: 27u AM/24u bedtime  3. Check blood sugars before all meals  4. If you have any low blood sugars please reach out via [facility] [employer]  5. Follow up next month.      Topics to cover at upcoming  "visits: Healthy Eating, Being Active, Monitoring, Taking Medication and Problem Solving    Follow-up: November    See Care Plan for co-developed, patient-state behavior change goals.  AVS provided for patient today.    Education Materials Provided:  No new materials provided today      SUBJECTIVE/OBJECTIVE:  Presents for: Individual review  Accompanied by: Self, Spouse  Diabetes education in the past 24mo: Yes  Focus of Visit: Other  Diabetes type: Type 2  Disease course: Worsening  Other concerns:: None  Cultural Influences/Ethnic Background:  Choose not to answer    Diabetes Symptoms & Complications:   Patient Problem List and Family Medical History reviewed for relevant medical history, current medical status, and diabetes risk factors.    Vitals:  There were no vitals taken for this visit.  Estimated body mass index is 41.97 kg/m  as calculated from the following:    Height as of 10/6/22: 1.715 m (5' 7.5\").    Weight as of 10/6/22: 123.4 kg (272 lb).   Last 3 BP:   BP Readings from Last 3 Encounters:   10/06/22 (!) 142/80   03/03/22 132/84   02/17/22 (!) 142/76       History   Smoking Status     Never   Smokeless Tobacco     Never       Labs:  Lab Results   Component Value Date    A1C 9.3 10/06/2022    A1C 8.7 05/14/2021     Lab Results   Component Value Date     10/06/2022     03/03/2022     05/06/2021     Lab Results   Component Value Date    LDL 55 05/06/2021     HDL Cholesterol   Date Value Ref Range Status   05/06/2021 50 >39 mg/dL Final   ]  GFR Estimate   Date Value Ref Range Status   10/06/2022 68 >60 mL/min/1.73m2 Final     Comment:     Effective December 21, 2021 eGFRcr in adults is calculated using the 2021 CKD-EPI creatinine equation which includes age and gender (Sheri diop al., NEJM, DOI: 10.1056/GURGxw2419226)   05/06/2021 75 >60 mL/min/[1.73_m2] Final     Comment:     Non  GFR Calc  Starting 12/18/2018, serum creatinine based estimated GFR (eGFR) will be "   calculated using the Chronic Kidney Disease Epidemiology Collaboration   (CKD-EPI) equation.       GFR Estimate If Black   Date Value Ref Range Status   05/06/2021 87 >60 mL/min/[1.73_m2] Final     Comment:      GFR Calc  Starting 12/18/2018, serum creatinine based estimated GFR (eGFR) will be   calculated using the Chronic Kidney Disease Epidemiology Collaboration   (CKD-EPI) equation.       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 : Yes  Times checking blood sugar at home (number): 3  Blood glucose trend: Decreasing (reports numbers are getting better, trying to eat less and more active)  Date Breakfast  Lunch  Dinner  Bedtime    Before After Before After Before After    10/14 113  129  171     10/15 79    288     10/16 88  305  240     10/17 76    230     10/18 112         *didn't take insulin for lunch 10/17 and 10/15  *cocowheat cereal (50g cho OR 1 cup dry oatmeal ( OR eggs + toast OR potatoes  *cornmeal mush 10/14 for breakfast  Taking Medications:  Diabetes Medication(s)     Insulin       insulin aspart (NOVOLOG FLEXPEN) 100 UNIT/ML pen    Inject  5 to 8units of Novolog before meals based on carbohydrate amount + pre meal correction (Max total dose of 13 units each injection for breakfast, lunch and dinner). ESTABLISH CARE WITH NEW PCP.      insulin glargine (LANTUS SOLOSTAR) 100 UNIT/ML pen    INJECT 27 UNITS SUBCUTANEOUSLY TWICE DAILY INCREASE  BY  3  UNITS  EVERY  4-5  DAYS  UNTIL  GLUCOSE  IS  UNDER  150          Taking Medication Assessed Today: Yes  Current Treatments: Diet, Insulin Injections  Dose schedule: Pre-breakfast, Pre-lunch, Pre-dinner, At bedtime  Given by: Patient  Problems taking diabetes medications regularly?: Yes (sometimes misses shots)  Diabetes medication side effects?: No    Problem Solving:  Problem Solving Assessed Today: Yes  Is the patient at risk for hypoglycemia?: Yes  Hypoglycemia Frequency: Rarely (reports it has been a while, last BG low was 68)    Reducing Risks:  Reducing Risks Assessed Today: No    Healthy Coping:  Healthy Coping Assessed Today: Yes  Stage of change: ACTION (Actively working towards change)  Patient Activation Measure Survey Score:  NILSON Score (Last Two) 11/19/2010   NILSON Raw Score 37   Activation Score 49.9   NILSON Level 2         Care Plan and Education Provided:  Care Plan: Diabetes   Updates made by Cira Tolliver since 10/18/2022 12:00 AM      Problem: HbA1C Not In Goal       Goal: Establish Regular Follow-Ups with PCP       Task: Discuss with PCP the recommended timing for patient's next follow up visit(s)    Responsible User: Cira Tolliver      Task: Discuss schedule for PCP visits with patient    Responsible User: Cira Tolliver      Goal: Get HbA1C Level in Goal       Task: Educate patient on diabetes education self-management topics    Responsible User: Cira Tolliver      Task: Educate patient on benefits of regular glucose monitoring Completed 10/18/2022   Responsible User: Cira Tolliver      Task: Refer patient to appropriate extended care team member, as needed (Medication Therapy Management, Behavioral Health, Physical Therapy, etc.)    Responsible User: Cira Tolliver      Task: Discuss diabetes treatment plan with patient    Responsible User: Cira Tolliver      Problem:  Diabetes Self-Management Education Needed to Optimize Self-Care Behaviors       Goal: Understand diabetes pathophysiology and disease progression       Task: Provide education on diabetes pathophysiology and disease progression specfic to patient's diabetes type    Responsible User: Cira Tolliver      Goal: Healthy Eating - follow a healthy eating pattern for diabetes       Task: Provide education on portion control and consistency in amount, composition and timing of food intake    Responsible User: Cira Tolliver      Task: Provide education on managing carbohydrate intake (carbohydrate counting, plate planning method, etc.) Completed 10/18/2022   Responsible User: Cira Tolliver      Task: Provide education on weight management    Responsible User: Cira Tolliver      Task: Provide education on heart healthy eating    Responsible User: Cira Tolliver      Task: Provide education on eating out    Responsible User: Cira Tolliver      Task: Develop individualized healthy eating plan with patient    Responsible User: Cira Tolliver      Goal: Being Active - get regular physical activity, working up to at least 150 minutes per week       Task: Provide education on relationship of activity to glucose and precautions to take if at risk for low glucose    Responsible User: Cira Tolliver      Task: Discuss barriers to physical activity with patient    Responsible User: Cira Tolliver      Task: Develop physical activity plan with patient    Responsible User: Cira Tolliver      Task: Explore community resources including walking groups, assistance programs, and home videos    Responsible User: Cira Tolliver      Goal: Monitoring - monitor glucose and ketones as directed       Task: Provide education on blood glucose monitoring (purpose, proper technique, frequency, glucose targets, interpreting results, when to use glucose control solution, sharps disposal) Completed 10/18/2022   Responsible User: Unruly  Cira      Task: Provide education on continuous glucose monitoring (sensor placement, use of lizett or /reader, understanding glucose trends, alerts and alarms, differences between sensor glucose and blood glucose)    Responsible User: Cira Tolliver   Note:    Discussed possibly starting a CGM, he is not interested in this.     Task: Provide education on ketone monitoring (when to monitor, frequency, etc.)    Responsible User: Cira Tolliver      Goal: Taking Medication - patient is consistently taking medications as directed    This Visit's Progress: 60%   Note:    I will take my insulin as prescribed     Task: Provide education on action of prescribed medication, including when to take and possible side effects    Responsible User: Cira Tolliver      Task: Provide education on insulin and injectable diabetes medications, including administration, storage, site selection and rotation for injection sites    Responsible User: Cira Tolliver      Task: Discuss barriers to medication adherence with patient and provide management technique ideas as appropriate    Responsible User: Cira Tolliver      Task: Provide education on frequency and refill details of medications    Responsible User: Cira Tolliver      Goal: Problem Solving - know how to prevent and manage short-term diabetes complications       Task: Provide education on high blood glucose - causes, signs/symptoms, prevention and treatment    Responsible User: Cira Tolliver      Task: Provide education on low blood glucose - causes, signs/symptoms, prevention, treatment, carrying a carbohydrate source at all times, and medical identification Completed 10/18/2022   Responsible User: Cira Tolliver      Task: Provide education on safe travel with diabetes    Responsible User: Cira Tolliver      Task: Provide education on how to care for diabetes on sick days    Responsible User: Cira Tolliver      Task: Provide education on when to call a  health care provider    Responsible User: Cira Tolliver      Goal: Reducing Risks - know how to prevent and treat long-term diabetes complications       Task: Provide education on major complications of diabetes, prevention, early diagnostic measures and treatment of complications    Responsible User: Cira Tolliver      Task: Provide education on recommended care for dental, eye and foot health    Responsible User: Cira Tolliver      Task: Provide education on Hemoglobin A1c - goals and relationship to blood glucose levels    Responsible User: Cira Tolliver      Task: Provide education on recommendations for heart health - lipid levels and goals, blood pressure and goals, and aspirin therapy, if indicated    Responsible User: Cira Tolliver      Task: Provide education on tobacco cessation    Responsible User: Cira Tolliver      Goal: Healthy Coping - use available resources to cope with the challenges of managing diabetes       Task: Discuss recognizing feelings about having diabetes    Responsible User: Cira Tolliver      Task: Provide education on the benefits of making appropriate lifestyle changes    Responsible User: Cira Tolliver      Task: Provide education on benefits of utilizing support systems    Responsible User: Cira Tolliver      Task: Discuss methods for coping with stress    Responsible User: Cira Tolliver      Task: Provide education on when to seek professional counseling    Responsible User: Cira Tolliver RD, LD, [facility]        Time Spent: 25 minutes  Encounter Type: Individual    Any diabetes medication dose changes were made via the CDE Protocol per the patient's primary care provider. A copy of this encounter was shared with the provider.

## 2022-10-20 ENCOUNTER — ALLIED HEALTH/NURSE VISIT (OUTPATIENT)
Dept: FAMILY MEDICINE | Facility: CLINIC | Age: 77
End: 2022-10-20
Payer: COMMERCIAL

## 2022-10-20 ENCOUNTER — TELEPHONE (OUTPATIENT)
Dept: FAMILY MEDICINE | Facility: CLINIC | Age: 77
End: 2022-10-20

## 2022-10-20 VITALS — DIASTOLIC BLOOD PRESSURE: 86 MMHG | HEART RATE: 90 BPM | SYSTOLIC BLOOD PRESSURE: 152 MMHG

## 2022-10-20 DIAGNOSIS — I10 ESSENTIAL HYPERTENSION: ICD-10-CM

## 2022-10-20 DIAGNOSIS — I10 ESSENTIAL HYPERTENSION: Primary | ICD-10-CM

## 2022-10-20 PROCEDURE — 99207 PR NO CHARGE NURSE ONLY: CPT

## 2022-10-20 NOTE — PROGRESS NOTES
Orville Plasencia is a 77 year old year old patient who comes in today for a Blood Pressure check because of ongoing blood pressure monitoring.  * takes BP med in evening.     Vital Signs as repeated by RN   160/88 p90  152/86 p90    Patient is taking medication as prescribed  Patient is tolerating medications well.  Patient is not monitoring Blood Pressure at home. encouraged to get home BP machine & start checking BP daily, log readings & bring to next appt. Also reviewed low sodium diet.   Current complaints: none  Disposition:  patient to continue with the same medication and routed to provider for review.    Bhavani Rasmussen RN

## 2022-10-20 NOTE — TELEPHONE ENCOUNTER
Dr Michel,    Orville Plasencia is a 77 year old year old patient who comes in today for a Blood Pressure check because of ongoing blood pressure monitoring.  * takes BP med in evening.      Vital Signs as repeated by RN   160/88 p90  152/86 p90     Patient is taking medication as prescribed  Patient is tolerating medications well.  Patient is not monitoring Blood Pressure at home. encouraged to get home BP machine & start checking BP daily, log readings & bring to next appt. Also reviewed low sodium diet.   Current complaints: none  Disposition:  patient to continue with the same medication and routed to provider for review.     Bhavani Rasmussen RN

## 2022-10-24 RX ORDER — LOSARTAN POTASSIUM 50 MG/1
100 TABLET ORAL DAILY
Qty: 180 TABLET | Refills: 3 | Status: SHIPPED | OUTPATIENT
Start: 2022-10-24 | End: 2023-12-01

## 2022-10-24 NOTE — TELEPHONE ENCOUNTER
Recommend taking losartan in morning. Recommend increasing dose to 100 mg daily. Will fax more medication to patient's pharmacy.

## 2022-11-01 ENCOUNTER — ALLIED HEALTH/NURSE VISIT (OUTPATIENT)
Dept: FAMILY MEDICINE | Facility: CLINIC | Age: 77
End: 2022-11-01
Payer: COMMERCIAL

## 2022-11-01 ENCOUNTER — TELEPHONE (OUTPATIENT)
Dept: FAMILY MEDICINE | Facility: CLINIC | Age: 77
End: 2022-11-01

## 2022-11-01 VITALS — SYSTOLIC BLOOD PRESSURE: 156 MMHG | HEART RATE: 84 BPM | DIASTOLIC BLOOD PRESSURE: 88 MMHG

## 2022-11-01 DIAGNOSIS — I10 HYPERTENSION, UNSPECIFIED TYPE: Primary | ICD-10-CM

## 2022-11-01 DIAGNOSIS — I10 ESSENTIAL HYPERTENSION: Primary | ICD-10-CM

## 2022-11-01 PROCEDURE — 99207 PR NO CHARGE NURSE ONLY: CPT

## 2022-11-01 NOTE — TELEPHONE ENCOUNTER
Orville Plasencia is a 77 year old year old patient who comes in today for a Blood Pressure check because of medication change.  Losartan increased recently.   Vital Signs as repeated by RN today:  /90, then 156/88 five minutes later. Pulse 84.   Patient is taking medication as prescribed.  Patient is tolerating medications well.  Patient is monitoring Blood Pressure at home.  Average readings are varying from 140's-180's/70's-100; Pulse 70's-80's. He had a home reading of 176/103 this morning.   Current complaints: none  It sounds like patient gets a fair amount of activity outside-cutting wood, hunting, etc. We discussed sodium, and he reports he doesn't add extra salt to foods.   Disposition:  Forwarding to provider to advise re: ongoing high BP.     May Moss RN  [facility]

## 2022-11-01 NOTE — PROGRESS NOTES
Orville Plasencia is a 77 year old year old patient who comes in today for a Blood Pressure check because of medication change.  Losartan increased recently.   Vital Signs as repeated by RN today:  /90, then 156/88 five minutes later. Pulse 84.   Patient is taking medication as prescribed.  Patient is tolerating medications well.  Patient is monitoring Blood Pressure at home.  Average readings are varying from 140's-180's/70's-100; Pulse 70's-80's. He had a home reading of 176/103 this morning.   Current complaints: none  It sounds like patient gets a fair amount of activity outside-cutting wood, hunting, etc. We discussed sodium, and he reports he doesn't add extra salt to foods.   Disposition:  Forwarding to provider to advise re: ongoing high BP.     May Moss RN  [facility]

## 2022-11-02 RX ORDER — AMLODIPINE BESYLATE 5 MG/1
5 TABLET ORAL DAILY
Qty: 30 TABLET | Refills: 1 | Status: SHIPPED | OUTPATIENT
Start: 2022-11-02 | End: 2022-12-29

## 2022-11-02 NOTE — TELEPHONE ENCOUNTER
Blood pressure noted and still high. Would recommend adding amlodipine. Patient to come back for an RN visit in 3 weeks. Medication will be faxed to the pharmacy.

## 2022-11-02 NOTE — TELEPHONE ENCOUNTER
Called pt & read providers message. Pt verbalized understanding. Will call to schedule RN BP appt for 3 weeks.    Bhavani Rasmussen RN

## 2022-11-15 ENCOUNTER — VIRTUAL VISIT (OUTPATIENT)
Dept: EDUCATION SERVICES | Facility: CLINIC | Age: 77
End: 2022-11-15
Payer: COMMERCIAL

## 2022-11-15 DIAGNOSIS — Z79.4 TYPE 2 DIABETES MELLITUS WITH HYPERGLYCEMIA, WITH LONG-TERM CURRENT USE OF INSULIN (H): Primary | ICD-10-CM

## 2022-11-15 DIAGNOSIS — E11.65 TYPE 2 DIABETES MELLITUS WITH HYPERGLYCEMIA, WITH LONG-TERM CURRENT USE OF INSULIN (H): Primary | ICD-10-CM

## 2022-11-15 PROCEDURE — 98967 PH1 ASSMT&MGMT NQHP 11-20: CPT | Mod: TEL | Performed by: DIETITIAN, REGISTERED

## 2022-11-15 RX ORDER — INSULIN ASPART 100 [IU]/ML
INJECTION, SOLUTION INTRAVENOUS; SUBCUTANEOUS
Qty: 45 ML | Refills: 0
Start: 2022-11-15 | End: 2022-12-29

## 2022-11-15 RX ORDER — INSULIN GLARGINE 100 [IU]/ML
INJECTION, SOLUTION SUBCUTANEOUS
Qty: 45 ML | Refills: 4
Start: 2022-11-15 | End: 2023-01-02

## 2022-11-15 NOTE — PROGRESS NOTES
Diabetes Self-Management Education & Support    Presents for: Follow-up    Type of Service: Telephone Visit    Originating Location (Patient Location): Home  Distant Location (Provider Location): Home  Mode of Communication:  Telephone    Telephone Visit Start Time: 10:01  Telephone Visit End Time (telephone visit stop time): 10:20    How would patient like to obtain AVS? Ab    Assessment Type:   ASSESSMENT:  Orville's wife is concerned regarding low BG in AM, feels this started happening he changed BP medicine. We discussed reducing Lantus to 27/19, if BG less than 80 in the morning will reduce evening Lantus by 2 units each time. Increased his Breakfast mealtime dose by 1 unit to 9 units. Discussed trying to be more consistent with lunch dose, misses often when he is out. Discussed potential to wear patch (Cequr simplicity) but he was not interested. Will work on trying to take lunch dose more consistently. Wife also concerned about dizzy feeling that Orville experiences sometimes after eating. Suggested she check his BG and BP when this happens and also discuss with PCP.  We will f/u next month.    Patient's most recent   Lab Results   Component Value Date    A1C 9.3 10/06/2022    A1C 8.7 05/14/2021     is not meeting goal of <8.0    Diabetes knowledge and skills assessment:   Patient is knowledgeable in diabetes management concepts related to: Healthy Eating, Being Active and Monitoring    Continue education with the following diabetes management concepts: Taking Medication and Problem Solving    Based on learning assessment above, most appropriate setting for further diabetes education would be: Individual setting.      PLAN  1. Reduce Lantus dose: 27u AM/ 19u evening, if blood sugar less than 80 in the morning reduce evening Lantus dose by 2 units  2. Increase Novolog to 9u with breakfast and continue 8u with lunch/dinner (+ sliding scale with meals if needed)  Sliding scale with meals  140-169  1  "units  170-199  2 units  200-229 3 units  230-259  4 units  260-289  5 units  290-319 6 units  320-349 7 units  350+ 8 units     3. Remember your lunchtime insulin, try to eat low cho if you forget your insulin when you are out!  4.  Follow up next month.    Topics to cover at upcoming visits: Monitoring, Taking Medication and Problem Solving    Follow-up: next month    See Care Plan for co-developed, patient-state behavior change goals.  AVS provided for patient today.    Education Materials Provided:  No new materials provided today      SUBJECTIVE/OBJECTIVE:  Presents for: Follow-up  Accompanied by: Self, Spouse  Diabetes education in the past 24mo: Yes  Focus of Visit: Other  Diabetes type: Type 2  Other concerns:: None  Cultural Influences/Ethnic Background:  Choose not to answer    Diabetes Symptoms & Complications:  Other: Yes (gets dizzy spells sometimes after eating in evening)  Complications assessed today?: No    Patient Problem List and Family Medical History reviewed for relevant medical history, current medical status, and diabetes risk factors.    Vitals:  There were no vitals taken for this visit.  Estimated body mass index is 41.97 kg/m  as calculated from the following:    Height as of 10/6/22: 1.715 m (5' 7.5\").    Weight as of 10/6/22: 123.4 kg (272 lb).   Last 3 BP:   BP Readings from Last 3 Encounters:   11/01/22 (!) 156/88   10/20/22 (!) 152/86   10/06/22 (!) 142/80       History   Smoking Status     Never   Smokeless Tobacco     Never       Labs:  Lab Results   Component Value Date    A1C 9.3 10/06/2022    A1C 8.7 05/14/2021     Lab Results   Component Value Date     10/06/2022     03/03/2022     05/06/2021     Lab Results   Component Value Date    LDL 55 05/06/2021     HDL Cholesterol   Date Value Ref Range Status   05/06/2021 50 >39 mg/dL Final   ]  GFR Estimate   Date Value Ref Range Status   10/06/2022 68 >60 mL/min/1.73m2 Final     Comment:     Effective December 21, " 2021 eGFRcr in adults is calculated using the 2021 CKD-EPI creatinine equation which includes age and gender (Sheri diop al., [place], DOI: 10.1056/OSUDhu8306993)   05/06/2021 75 >60 mL/min/[1.73_m2] Final     Comment:     Non  GFR Calc  Starting 12/18/2018, serum creatinine based estimated GFR (eGFR) will be   calculated using the Chronic Kidney Disease Epidemiology Collaboration   (CKD-EPI) equation.       GFR Estimate If Black   Date Value Ref Range Status   05/06/2021 87 >60 mL/min/[1.73_m2] Final     Comment:      GFR Calc  Starting 12/18/2018, serum creatinine based estimated GFR (eGFR) will be   calculated using the Chronic Kidney Disease Epidemiology Collaboration   (CKD-EPI) equation.       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 : Yes  Times checking blood sugar at home (number): 3  Blood glucose trend: Decreasing (reports numbers are getting better, trying to eat less and more active)    Date Breakfast  Lunch  Dinner  Bedtime    Before After Before After Before After    11/9 61    342     11/10 179  88  168     11/11 98  275  226     11/12  78    349     11/13 96    207     11/14 72  200  92     11/15 66             Taking Medications:  Diabetes Medication(s)     Insulin       insulin aspart (NOVOLOG FLEXPEN) 100 UNIT/ML pen    Inject  8 to 9 units of Novolog before meals based on carbohydrate amount + pre meal correction 1u per 30 >130 (Max total dose of 16 units each injection for breakfast, lunch and dinner).     insulin glargine (LANTUS SOLOSTAR) 100 UNIT/ML pen    INJECT 27 UNITS SUBCUTANEOUSLY IN THE MORNING AND 24 UNITS IN THE EVENING.          Taking Medication Assessed Today: Yes  Current Treatments: Diet, Insulin Injections  Dose schedule: Pre-breakfast, Pre-lunch, Pre-dinner, At bedtime (misses lunch dose frequently)  Given by: Patient  Problems taking diabetes medications regularly?: Yes (sometimes misses shots)  Diabetes medication side effects?: No    Problem Solving:  Problem Solving Assessed Today: Yes  Is the patient at risk for hypoglycemia?: Yes  Hypoglycemia Frequency: Weekly (increased since starting new BP med)    Reducing Risks:  Reducing Risks Assessed Today: No    Healthy Coping:  Healthy Coping Assessed Today: No  Stage of change: ACTION (Actively working towards change)  Patient Activation Measure Survey Score:  NILSON Score (Last Two) 11/19/2010   NILSON Raw Score 37   Activation Score 49.9   NILSON Level 2         Care Plan and Education Provided:  Care Plan: Diabetes   Updates made by Cira Tolliver since 11/15/2022 12:00 AM      Problem: Diabetes Self-Management Education Needed to Optimize Self-Care Behaviors       Goal: Taking Medication - patient is consistently taking medications as directed    This Visit's Progress: 80%   Recent Progress: 60%   Note:    I will take my insulin as prescribed     Goal: Problem Solving - know how to prevent and manage short-term diabetes complications       Task: Provide education on high blood glucose - causes, signs/symptoms, prevention and treatment Completed 11/15/2022   Responsible User:  Cira Tolliver RD, LD, [facility]        Time Spent: 20 minutes  Encounter Type: Individual    Any diabetes medication dose changes were made via the CDE Protocol per the patient's primary care provider. A copy of this encounter was shared with the provider.

## 2022-11-15 NOTE — LETTER
11/15/2022         RE: Orville Plasencia  [address] [facility] [address]        Dear Colleague,    Thank you for referring your patient, Orville Plasencia, to the [facility]. Please see a copy of my visit note below.    Diabetes Self-Management Education & Support    Presents for: Follow-up    Type of Service: Telephone Visit    Originating Location (Patient Location): Home  Distant Location (Provider Location): Home  Mode of Communication:  Telephone    Telephone Visit Start Time: 10:01  Telephone Visit End Time (telephone visit stop time): 10:20    How would patient like to obtain AVS? MyChart    Assessment Type:   ASSESSMENT:  Orville's wife is concerned regarding low BG in AM, feels this started happening he changed BP medicine. We discussed reducing Lantus to 27/19, if BG less than 80 in the morning will reduce evening Lantus by 2 units each time. Increased his Breakfast mealtime dose by 1 unit to 9 units. Discussed trying to be more consistent with lunch dose, misses often when he is out. Discussed potential to wear patch (Cequr simplicity) but he was not interested. Will work on trying to take lunch dose more consistently. Wife also concerned about dizzy feeling that Orville experiences sometimes after eating. Suggested she check his BG and BP when this happens and also discuss with PCP.  We will f/u next month.    Patient's most recent   Lab Results   Component Value Date    A1C 9.3 10/06/2022    A1C 8.7 05/14/2021     is not meeting goal of <8.0    Diabetes knowledge and skills assessment:   Patient is knowledgeable in diabetes management concepts related to: Healthy Eating, Being Active and Monitoring    Continue education with the following diabetes management concepts: Taking Medication and Problem Solving    Based on learning assessment above, most appropriate setting for further diabetes education would be: Individual setting.      PLAN  1. Reduce Lantus dose: 27u AM/ 19u  "evening, if blood sugar less than 80 in the morning reduce evening Lantus dose by 2 units  2. Increase Novolog to 9u with breakfast and continue 8u with lunch/dinner (+ sliding scale with meals if needed)  Sliding scale with meals  140-169  1 units  170-199  2 units  200-229 3 units  230-259  4 units  260-289  5 units  290-319 6 units  320-349 7 units  350+ 8 units     3. Remember your lunchtime insulin, try to eat low cho if you forget your insulin when you are out!  4.  Follow up next month.    Topics to cover at upcoming visits: Monitoring, Taking Medication and Problem Solving    Follow-up: next month    See Care Plan for co-developed, patient-state behavior change goals.  AVS provided for patient today.    Education Materials Provided:  No new materials provided today      SUBJECTIVE/OBJECTIVE:  Presents for: Follow-up  Accompanied by: Self, Spouse  Diabetes education in the past 24mo: Yes  Focus of Visit: Other  Diabetes type: Type 2  Other concerns:: None  Cultural Influences/Ethnic Background:  Choose not to answer    Diabetes Symptoms & Complications:  Other: Yes (gets dizzy spells sometimes after eating in evening)  Complications assessed today?: No    Patient Problem List and Family Medical History reviewed for relevant medical history, current medical status, and diabetes risk factors.    Vitals:  There were no vitals taken for this visit.  Estimated body mass index is 41.97 kg/m  as calculated from the following:    Height as of 10/6/22: 1.715 m (5' 7.5\").    Weight as of 10/6/22: 123.4 kg (272 lb).   Last 3 BP:   BP Readings from Last 3 Encounters:   11/01/22 (!) 156/88   10/20/22 (!) 152/86   10/06/22 (!) 142/80       History   Smoking Status     Never   Smokeless Tobacco     Never       Labs:  Lab Results   Component Value Date    A1C 9.3 10/06/2022    A1C 8.7 05/14/2021     Lab Results   Component Value Date     10/06/2022     03/03/2022     05/06/2021     Lab Results "   Component Value Date    LDL 55 05/06/2021     HDL Cholesterol   Date Value Ref Range Status   05/06/2021 50 >39 mg/dL Final   ]  GFR Estimate   Date Value Ref Range Status   10/06/2022 68 >60 mL/min/1.73m2 Final     Comment:     Effective December 21, 2021 eGFRcr in adults is calculated using the 2021 CKD-EPI creatinine equation which includes age and gender (Sheri et al., [place], DOI: 10.1056/FBXPfg2445064)   05/06/2021 75 >60 mL/min/[1.73_m2] Final     Comment:     Non  GFR Calc  Starting 12/18/2018, serum creatinine based estimated GFR (eGFR) will be   calculated using the Chronic Kidney Disease Epidemiology Collaboration   (CKD-EPI) equation.       GFR Estimate If Black   Date Value Ref Range Status   05/06/2021 87 >60 mL/min/[1.73_m2] Final     Comment:      GFR Calc  Starting 12/18/2018, serum creatinine based estimated GFR (eGFR) will be   calculated using the Chronic Kidney Disease Epidemiology Collaboration   (CKD-EPI) equation.       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 : Yes  Times checking blood sugar at home (number): 3  Blood  glucose trend: Decreasing (reports numbers are getting better, trying to eat less and more active)    Date Breakfast  Lunch  Dinner  Bedtime    Before After Before After Before After    11/9 61    342     11/10 179  88  168     11/11 98  275  226     11/12 78    349     11/13 96    207     11/14 72  200  92     11/15 66             Taking Medications:  Diabetes Medication(s)     Insulin       insulin aspart (NOVOLOG FLEXPEN) 100 UNIT/ML pen    Inject  8 to 9 units of Novolog before meals based on carbohydrate amount + pre meal correction 1u per 30 >130 (Max total dose of 16 units each injection for breakfast, lunch and dinner).     insulin glargine (LANTUS SOLOSTAR) 100 UNIT/ML pen    INJECT 27 UNITS SUBCUTANEOUSLY IN THE MORNING AND 24 UNITS IN THE EVENING.          Taking Medication Assessed Today: Yes  Current Treatments: Diet, Insulin Injections  Dose schedule: Pre-breakfast, Pre-lunch, Pre-dinner, At bedtime (misses lunch dose frequently)  Given by: Patient  Problems taking diabetes medications regularly?: Yes (sometimes misses shots)  Diabetes medication side effects?: No    Problem Solving:  Problem Solving Assessed Today: Yes  Is the patient at risk for hypoglycemia?: Yes  Hypoglycemia Frequency: Weekly (increased since starting new BP med)    Reducing Risks:  Reducing Risks Assessed Today: No    Healthy Coping:  Healthy Coping Assessed Today: No  Stage of change: ACTION (Actively working towards change)  Patient Activation Measure Survey Score:  NILSON Score (Last Two) 11/19/2010   NILSON Raw Score 37   Activation Score 49.9   NILSON Level 2         Care Plan and Education Provided:  Care Plan: Diabetes   Updates made by Cira Tolliver since 11/15/2022 12:00 AM      Problem: Diabetes Self-Management Education Needed to Optimize Self-Care Behaviors       Goal: Taking Medication - patient is consistently taking medications as directed    This Visit's Progress: 80%   Recent Progress: 60%   Note:    I will take my  insulin as prescribed     Goal: Problem Solving - know how to prevent and manage short-term diabetes complications       Task: Provide education on high blood glucose - causes, signs/symptoms, prevention and treatment Completed 11/15/2022   Responsible User: Cira Tolliver RD, LD, [facility]        Time Spent: 20 minutes  Encounter Type: Individual    Any diabetes medication dose changes were made via the CDE Protocol per the patient's primary care provider. A copy of this encounter was shared with the provider.

## 2022-12-29 ENCOUNTER — OFFICE VISIT (OUTPATIENT)
Dept: FAMILY MEDICINE | Facility: CLINIC | Age: 77
End: 2022-12-29
Payer: COMMERCIAL

## 2022-12-29 VITALS
RESPIRATION RATE: 16 BRPM | BODY MASS INDEX: 41.65 KG/M2 | TEMPERATURE: 97.5 F | OXYGEN SATURATION: 98 % | WEIGHT: 274.8 LBS | HEART RATE: 72 BPM | DIASTOLIC BLOOD PRESSURE: 70 MMHG | SYSTOLIC BLOOD PRESSURE: 122 MMHG | HEIGHT: 68 IN

## 2022-12-29 DIAGNOSIS — Z79.4 TYPE 2 DIABETES MELLITUS WITH HYPERGLYCEMIA, WITH LONG-TERM CURRENT USE OF INSULIN (H): Primary | ICD-10-CM

## 2022-12-29 DIAGNOSIS — E11.65 TYPE 2 DIABETES MELLITUS WITH HYPERGLYCEMIA, WITH LONG-TERM CURRENT USE OF INSULIN (H): Primary | ICD-10-CM

## 2022-12-29 DIAGNOSIS — E78.5 HYPERLIPIDEMIA LDL GOAL <70: ICD-10-CM

## 2022-12-29 DIAGNOSIS — N18.30 STAGE 3 CHRONIC KIDNEY DISEASE, UNSPECIFIED WHETHER STAGE 3A OR 3B CKD (H): ICD-10-CM

## 2022-12-29 DIAGNOSIS — N40.0 BENIGN NON-NODULAR PROSTATIC HYPERPLASIA: ICD-10-CM

## 2022-12-29 DIAGNOSIS — I10 HYPERTENSION, UNSPECIFIED TYPE: ICD-10-CM

## 2022-12-29 PROCEDURE — 99214 OFFICE O/P EST MOD 30 MIN: CPT | Performed by: FAMILY MEDICINE

## 2022-12-29 RX ORDER — TAMSULOSIN HYDROCHLORIDE 0.4 MG/1
CAPSULE ORAL
Qty: 180 CAPSULE | Refills: 3 | Status: SHIPPED | OUTPATIENT
Start: 2022-12-29 | End: 2024-01-18

## 2022-12-29 RX ORDER — AMLODIPINE BESYLATE 5 MG/1
5 TABLET ORAL DAILY
Qty: 90 TABLET | Refills: 3 | Status: SHIPPED | OUTPATIENT
Start: 2022-12-29 | End: 2024-01-18

## 2022-12-29 RX ORDER — INSULIN ASPART 100 [IU]/ML
INJECTION, SOLUTION INTRAVENOUS; SUBCUTANEOUS
Qty: 45 ML | Refills: 3 | Status: SHIPPED | OUTPATIENT
Start: 2022-12-29 | End: 2023-01-02

## 2022-12-29 ASSESSMENT — ENCOUNTER SYMPTOMS
RESPIRATORY NEGATIVE: 1
HEMATOLOGIC/LYMPHATIC NEGATIVE: 1
PSYCHIATRIC NEGATIVE: 1
ALLERGIC/IMMUNOLOGIC NEGATIVE: 1
CARDIOVASCULAR NEGATIVE: 1
ENDOCRINE NEGATIVE: 1
CONSTITUTIONAL NEGATIVE: 1
GASTROINTESTINAL NEGATIVE: 1
MUSCULOSKELETAL NEGATIVE: 1
NEUROLOGICAL NEGATIVE: 1
EYES NEGATIVE: 1

## 2022-12-29 ASSESSMENT — PAIN SCALES - GENERAL: PAINLEVEL: NO PAIN (0)

## 2022-12-29 NOTE — PROGRESS NOTES
Assessment & Plan   Patient is a 77-year-old male accompanied by his wife here for diabetes recheck.  Was seen about 2 months ago and at that time his diabetes was much worse.  Since then he has seen the diabetic educator and he is kind of on a regiment for his blood sugars.  He reports that he has noticed a difference since then.  A1c is not due till early January.  Patient also noticed that blood pressure is much better last visit we had added amlodipine he seems to be tolerating this thus far.  Reports no side effects.  Discussed chronic kidney disease with pain recommend hydration.  We will continue to monitor labs.  Labs were ordered today.  No other complaints today.      Type 2 diabetes mellitus with hyperglycemia, with long-term current use of insulin (H)  A1C ordered for early January  - Hemoglobin A1c; Future  - insulin aspart (NOVOLOG FLEXPEN) 100 UNIT/ML pen; Inject 9 unit(s) with breakfast and 8 units with lunch/dinner before meals + pre meal correction 1u per 30 >130 (Max total dose of 16 units each injection for breakfast, lunch and dinner).    Hypertension, unspecified type  Blood pressure much better than last check.   - amLODIPine (NORVASC) 5 MG tablet; Take 1 tablet (5 mg) by mouth daily    Benign non-nodular prostatic hyperplasia  Medication refilled.  - tamsulosin (FLOMAX) 0.4 MG capsule; Take 2 capsules by mouth once daily- Please prescribe when patient requests    Hyperlipidemia LDL goal <70  - Lipid panel reflex to direct LDL Fasting; Future    Stage 3 chronic kidney disease, unspecified whether stage 3a or 3b CKD (H)  Stable, last Cr was 1.11           Blood sugar testing frequency justification:  Uncontrolled diabetes  FUTURE APPOINTMENTS:       - Follow-up visit in one week or sooner as needed    Return in about 1 week (around 1/5/2023) for Follow up.    Rama Michel MD  [facility]    Jackie Jacinto is a 77 year old accompanied by his spouse,  presenting for the following health issues:  Diabetes and Hypertension      HPI     Diabetes Follow-up    How often are you checking your blood sugar? Three times daily  Blood sugar testing frequency justification:  Patient modifying lifestyle changes (diet, exercise) with blood sugars  What time of day are you checking your blood sugars (select all that apply)?  Before meals  Have you had any blood sugars above 200?  Yes - sometimes.   Have you had any blood sugars below 70?  No    What symptoms do you notice when your blood sugar is low?  Dizzy and Weak    What concerns do you have today about your diabetes? None     Do you have any of these symptoms? (Select all that apply)  Weight gain          Hypertension Follow-up      Do you check your blood pressure regularly outside of the clinic? Yes     Are you following a low salt diet? Yes    Are your blood pressures ever more than 140 on the top number (systolic) OR more   than 90 on the bottom number (diastolic), for example 140/90? Yes    BP Readings from Last 2 Encounters:   12/29/22 122/70   11/01/22 (!) 156/88     Hemoglobin A1C (%)   Date Value   10/06/2022 9.3 (H)   11/22/2021 7.2 (H)   05/14/2021 8.7 (H)   05/06/2021 8.2 (H)     LDL Cholesterol Calculated (mg/dL)   Date Value   05/06/2021 55   01/07/2020 51         How many servings of fruits and vegetables do you eat daily?  0-1    On average, how many sweetened beverages do you drink each day (Examples: soda, juice, sweet tea, etc.  Do NOT count diet or artificially sweetened beverages)?   0    How many days per week do you exercise enough to make your heart beat faster? 3 or less    How many minutes a day do you exercise enough to make your heart beat faster? 30 - 60    How many days per week do you miss taking your medication? 0         Review of Systems   Constitutional: Negative.    HENT: Negative.    Eyes: Negative.    Respiratory: Negative.    Cardiovascular: Negative.    Gastrointestinal: Negative.   "  Endocrine: Negative.    Genitourinary: Negative.    Musculoskeletal: Negative.    Skin: Negative.    Allergic/Immunologic: Negative.    Neurological: Negative.    Hematological: Negative.    Psychiatric/Behavioral: Negative.             Objective    /70   Pulse 72   Temp 97.5  F (36.4  C) (Tympanic)   Resp 16   Ht 1.715 m (5' 7.5\")   Wt 124.6 kg (274 lb 12.8 oz)   SpO2 98%   BMI 42.40 kg/m    Body mass index is 42.4 kg/m .  Physical Exam  Constitutional:       Appearance: Normal appearance. He is obese.   HENT:      Head: Normocephalic and atraumatic.      Right Ear: Tympanic membrane normal.      Left Ear: Tympanic membrane normal.      Nose: Nose normal.   Eyes:      Pupils: Pupils are equal, round, and reactive to light.   Cardiovascular:      Rate and Rhythm: Normal rate and regular rhythm.   Pulmonary:      Effort: Pulmonary effort is normal. No respiratory distress.      Breath sounds: Normal breath sounds. No stridor. No wheezing, rhonchi or rales.   Chest:      Chest wall: No tenderness.   Abdominal:      General: Abdomen is flat. Bowel sounds are normal. There is no distension.      Palpations: Abdomen is soft. There is no mass.      Tenderness: There is no abdominal tenderness. There is no right CVA tenderness, left CVA tenderness, guarding or rebound.      Hernia: No hernia is present.   Musculoskeletal:         General: Normal range of motion.   Neurological:      Mental Status: He is alert and oriented to person, place, and time.   Psychiatric:         Mood and Affect: Mood normal.         Behavior: Behavior normal.                        "

## 2023-01-02 ENCOUNTER — VIRTUAL VISIT (OUTPATIENT)
Dept: EDUCATION SERVICES | Facility: CLINIC | Age: 78
End: 2023-01-02
Attending: FAMILY MEDICINE
Payer: COMMERCIAL

## 2023-01-02 DIAGNOSIS — E11.65 TYPE 2 DIABETES MELLITUS WITH HYPERGLYCEMIA, WITH LONG-TERM CURRENT USE OF INSULIN (H): Primary | ICD-10-CM

## 2023-01-02 DIAGNOSIS — Z79.4 TYPE 2 DIABETES MELLITUS WITH HYPERGLYCEMIA, WITH LONG-TERM CURRENT USE OF INSULIN (H): Primary | ICD-10-CM

## 2023-01-02 PROCEDURE — 98967 PH1 ASSMT&MGMT NQHP 11-20: CPT | Mod: 95 | Performed by: DIETITIAN, REGISTERED

## 2023-01-02 RX ORDER — INSULIN ASPART 100 [IU]/ML
INJECTION, SOLUTION INTRAVENOUS; SUBCUTANEOUS
Qty: 45 ML | Refills: 3 | Status: SHIPPED | OUTPATIENT
Start: 2023-01-02 | End: 2024-01-12

## 2023-01-02 RX ORDER — INSULIN GLARGINE 100 [IU]/ML
INJECTION, SOLUTION SUBCUTANEOUS
Qty: 45 ML | Refills: 4 | Status: SHIPPED | OUTPATIENT
Start: 2023-01-02 | End: 2024-03-20

## 2023-01-02 NOTE — PATIENT INSTRUCTIONS
PLAN  1. Continue with Lantus 27/27  2. Increase mealtime insulin B-10u, L-9u, D-9u  3. Continue correction scale  Sliding scale with meals  140-169  1 units  170-199  2 units  200-229 3 units  230-259  4 units  260-289  5 units  290-319 6 units  320-349 7 units  350+ 8 units  4. Reduce sweets, aim for more non-starchy vegetables at meals, try to keep carbohydrates to 1/4ish of plate.

## 2023-01-02 NOTE — LETTER
1/2/2023         RE: Orville Plasencia  [id number] [facility] [address]        Dear Colleague,    Thank you for referring your patient, Orville Plasencia, to the [facility]. Please see a copy of my visit note below.    Diabetes Self-Management Education & Support    Presents for: Follow-up    Type of Service: Telephone Visit    Originating Location (Patient Location): Home  Distant Location (Provider Location): Home  Mode of Communication:  Telephone    Telephone Visit Start Time: 1:02  Telephone Visit End Time (telephone visit stop time): 1:15    How would patient like to obtain AVS? MyChart    Assessment Type:   ASSESSMENT:  Orville's BG have increased so he increased his Lantus back to 27/27. Socorro notes they have had a lot of holiday treats around the house and this is likely cause of increased BG. Discussed getting back to usual diet and also will increase mealtime dose 10/9/9. We will f/u next month. He is getting his A1C rechecked later this week. Also very high deductibles for SGLT2 or GLP-1 so just doing insulin.    Patient's most recent   Lab Results   Component Value Date    A1C 9.3 10/06/2022    A1C 8.7 05/14/2021     is not meeting goal of <7.0    Diabetes knowledge and skills assessment:   Patient is knowledgeable in diabetes management concepts related to: Healthy Eating, Being Active, Monitoring, Taking Medication and Problem Solving    Continue education with the following diabetes management concepts: Healthy Eating, Taking Medication, Problem Solving     Based on learning assessment above, most appropriate setting for further diabetes education would be: Individual setting.      PLAN  1. Continue with Lantus 27/27  2. Increase mealtime insulin B-10u, L-9u, D-9u  3. Continue correction scale  Sliding scale with meals  140-169  1 units  170-199  2 units  200-229 3 units  230-259  4 units  260-289  5 units  290-319 6 units  320-349 7 units  350+ 8 units  4. Reduce  "sweets, aim for more non-starchy vegetables at meals, try to keep carbohydrates to 1/4ish of plate.    Topics to cover at upcoming visits: Healthy Eating, Monitoring and Taking Medication    Follow-up: next month    See Care Plan for co-developed, patient-state behavior change goals.  AVS provided for patient today.    Education Materials Provided:  No new materials provided today      SUBJECTIVE/OBJECTIVE:  Presents for: Follow-up  Accompanied by: Self, Spouse  Diabetes education in the past 24mo: Yes  Focus of Visit: Other  Diabetes type: Type 2  Other concerns:: None  Cultural Influences/Ethnic Background:  Choose not to answer      Diabetes Symptoms & Complications:  Complications assessed today?: No    Patient Problem List and Family Medical History reviewed for relevant medical history, current medical status, and diabetes risk factors.    Vitals:  There were no vitals taken for this visit.  Estimated body mass index is 42.4 kg/m  as calculated from the following:    Height as of 12/29/22: 1.715 m (5' 7.5\").    Weight as of 12/29/22: 124.6 kg (274 lb 12.8 oz).   Last 3 BP:   BP Readings from Last 3 Encounters:   12/29/22 122/70   11/01/22 (!) 156/88   10/20/22 (!) 152/86       History   Smoking Status     Never   Smokeless Tobacco     Never       Labs:  Lab Results   Component Value Date    A1C 9.3 10/06/2022    A1C 8.7 05/14/2021     Lab Results   Component Value Date     10/06/2022     03/03/2022     05/06/2021     Lab Results   Component Value Date    LDL 55 05/06/2021     HDL Cholesterol   Date Value Ref Range Status   05/06/2021 50 >39 mg/dL Final   ]  GFR Estimate   Date Value Ref Range Status   10/06/2022 68 >60 mL/min/1.73m2 Final     Comment:     Effective December 21, 2021 eGFRcr in adults is calculated using the 2021 CKD-EPI creatinine equation which includes age and gender (Sheri diop al., NEJM, DOI: 10.1056/NKVPce8927620)   05/06/2021 75 >60 mL/min/[1.73_m2] Final     Comment: "     Non  GFR Calc  Starting 12/18/2018, serum creatinine based estimated GFR (eGFR) will be   calculated using the Chronic Kidney Disease Epidemiology Collaboration   (CKD-EPI) equation.       GFR Estimate If Black   Date Value Ref Range Status   05/06/2021 87 >60 mL/min/[1.73_m2] Final     Comment:      GFR Calc  Starting 12/18/2018, serum creatinine based estimated GFR (eGFR) will be   calculated using the Chronic Kidney Disease Epidemiology Collaboration   (CKD-EPI) equation.       Lab Results   Component Value Date    CR 1.11 10/06/2022    CR 0.97 05/06/2021     No results found for: MICROALBUMIN    Healthy Eating:  Healthy Eating Assessed Today: Yes  Meals include: Breakfast, Lunch, Dinner  Breakfast: oatmeal (1 cup-60g cho) OR cocowheats OR 1/2cup-60g cho OR eggs + 2 toast (30g) OR baked potato (45-60g)  Lunch: [place]  Dinner: chicken soup OR meat potatoes  Other: There have been a lot more sweets around the house given the holidays, Socorro reports all the sweets will be gone soon.  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 : Yes  Times checking blood sugar at home (number): 3  Blood glucose trend: Increasing (reports numbers are getting better, trying to eat less and more active)  Date Breakfast  Lunch  Dinner  Bedtime    Before After Before After Before After    12/27 186  193  266     12/28 203   304 359     12/29 160  237  393     12/30 223  360  459     12/31 155  234  201     1/1 194  293  349     1/2 203                Taking Medications:  Diabetes Medication(s)     Insulin       insulin aspart (NOVOLOG FLEXPEN) 100 UNIT/ML pen    Inject 9 unit(s) with breakfast and 8 units with lunch/dinner before meals + pre meal correction 1u per 30 >130 (Max total dose of 16 units each injection for breakfast, lunch and dinner).     insulin glargine (LANTUS SOLOSTAR) 100 UNIT/ML pen    INJECT 27 UNITS SUBCUTANEOUSLY IN THE MORNING AND 19 UNITS IN THE EVENING. If fasting blood sugar is less than 80 reduce evening dose by 2 units          Taking Medication Assessed Today: Yes  Current Treatments: Diet, Insulin Injections  Dose schedule: Pre-breakfast, Pre-lunch, Pre-dinner, At bedtime (misses lunch dose frequently)  Given by: Patient  Problems taking diabetes medications regularly?: Yes (sometimes misses shots)  Diabetes medication side effects?: No    Problem Solving:  Problem Solving Assessed Today: Yes  Is the patient at risk for hypoglycemia?: Yes  Hypoglycemia Frequency: Rarely      Reducing Risks:  Reducing Risks Assessed Today: No    Healthy Coping:  Healthy Coping Assessed Today: Yes  Emotional response to diabetes: Ready to learn  Stage of change: ACTION (Actively working towards change)  Patient Activation Measure Survey Score:  NILSON Score (Last Two) 11/19/2010   NILSON Raw Score 37   Activation Score 49.9   NILSON Level 2         Care Plan and Education Provided:  Care Plan: Diabetes   Updates made by Cira Tolliver since 1/2/2023 12:00 AM      Problem: Diabetes Self-Management Education Needed to Optimize Self-Care Behaviors       Goal: Healthy Eating - follow a healthy eating pattern for diabetes       Task: Provide education on portion control and consistency in amount, composition and timing of food intake Completed 1/2/2023   Responsible User: Cira Tolliver      Goal: Taking Medication - patient is consistently taking medications as directed    This Visit's Progress: 90%   Recent Progress: 80%   Note:    I will take my  insulin as prescribed     Goal: Problem Solving - know how to prevent and manage short-term diabetes complications       Task: Provide education on when to call a health care provider Completed 1/2/2023   Responsible User: Cira Tolliver RD, LUMA, [facility]        Time Spent: 13 minutes  Encounter Type: Individual    Any diabetes medication dose changes were made via the CDE Protocol per the patient's primary care provider. A copy of this encounter was shared with the provider.

## 2023-01-02 NOTE — PROGRESS NOTES
Diabetes Self-Management Education & Support    Presents for: Follow-up    Type of Service: Telephone Visit    Originating Location (Patient Location): Home  Distant Location (Provider Location): Home  Mode of Communication:  Telephone    Telephone Visit Start Time: 1:02  Telephone Visit End Time (telephone visit stop time): 1:15    How would patient like to obtain AVS? Ab    Assessment Type:   ASSESSMENT:  Orville's BG have increased so he increased his Lantus back to 27/27. Socorro notes they have had a lot of holiday treats around the house and this is likely cause of increased BG. Discussed getting back to usual diet and also will increase mealtime dose 10/9/9. We will f/u next month. He is getting his A1C rechecked later this week. Also very high deductibles for SGLT2 or GLP-1 so just doing insulin.    Patient's most recent   Lab Results   Component Value Date    A1C 9.3 10/06/2022    A1C 8.7 05/14/2021     is not meeting goal of <7.0    Diabetes knowledge and skills assessment:   Patient is knowledgeable in diabetes management concepts related to: Healthy Eating, Being Active, Monitoring, Taking Medication and Problem Solving    Continue education with the following diabetes management concepts: Healthy Eating, Taking Medication, Problem Solving     Based on learning assessment above, most appropriate setting for further diabetes education would be: Individual setting.      PLAN  1. Continue with Lantus 27/27  2. Increase mealtime insulin B-10u, L-9u, D-9u  3. Continue correction scale  Sliding scale with meals  140-169  1 units  170-199  2 units  200-229 3 units  230-259  4 units  260-289  5 units  290-319 6 units  320-349 7 units  350+ 8 units  4. Reduce sweets, aim for more non-starchy vegetables at meals, try to keep carbohydrates to 1/4ish of plate.    Topics to cover at upcoming visits: Healthy Eating, Monitoring and Taking Medication    Follow-up: next month    See Care Plan for co-developed,  "patient-state behavior change goals.  AVS provided for patient today.    Education Materials Provided:  No new materials provided today      SUBJECTIVE/OBJECTIVE:  Presents for: Follow-up  Accompanied by: Self, Spouse  Diabetes education in the past 24mo: Yes  Focus of Visit: Other  Diabetes type: Type 2  Other concerns:: None  Cultural Influences/Ethnic Background:  Choose not to answer      Diabetes Symptoms & Complications:  Complications assessed today?: No    Patient Problem List and Family Medical History reviewed for relevant medical history, current medical status, and diabetes risk factors.    Vitals:  There were no vitals taken for this visit.  Estimated body mass index is 42.4 kg/m  as calculated from the following:    Height as of 12/29/22: 1.715 m (5' 7.5\").    Weight as of 12/29/22: 124.6 kg (274 lb 12.8 oz).   Last 3 BP:   BP Readings from Last 3 Encounters:   12/29/22 122/70   11/01/22 (!) 156/88   10/20/22 (!) 152/86       History   Smoking Status     Never   Smokeless Tobacco     Never       Labs:  Lab Results   Component Value Date    A1C 9.3 10/06/2022    A1C 8.7 05/14/2021     Lab Results   Component Value Date     10/06/2022     03/03/2022     05/06/2021     Lab Results   Component Value Date    LDL 55 05/06/2021     HDL Cholesterol   Date Value Ref Range Status   05/06/2021 50 >39 mg/dL Final   ]  GFR Estimate   Date Value Ref Range Status   10/06/2022 68 >60 mL/min/1.73m2 Final     Comment:     Effective December 21, 2021 eGFRcr in adults is calculated using the 2021 CKD-EPI creatinine equation which includes age and gender (Sheri et al., NEJM, DOI: 10.1056/SKXLxj2368678)   05/06/2021 75 >60 mL/min/[1.73_m2] Final     Comment:     Non  GFR Calc  Starting 12/18/2018, serum creatinine based estimated GFR (eGFR) will be   calculated using the Chronic Kidney Disease Epidemiology Collaboration   (CKD-EPI) equation.       GFR Estimate If Black   Date Value Ref " Range Status   05/06/2021 87 >60 mL/min/[1.73_m2] Final     Comment:      GFR Calc  Starting 12/18/2018, serum creatinine based estimated GFR (eGFR) will be   calculated using the Chronic Kidney Disease Epidemiology Collaboration   (CKD-EPI) equation.       Lab Results   Component Value Date    CR 1.11 10/06/2022    CR 0.97 05/06/2021     No results found for: MICROALBUMIN    Healthy Eating:  Healthy Eating Assessed Today: Yes  Meals include: Breakfast, Lunch, Dinner  Breakfast: oatmeal (1 cup-60g cho) OR cocowheats OR 1/2cup-60g cho OR eggs + 2 toast (30g) OR baked potato (45-60g)  Lunch: [place]  Dinner: chicken soup OR meat potatoes  Other: There have been a lot more sweets around the house given the holidays, Socorro reports all the sweets will be gone soon.  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 : Yes  Times checking blood sugar at home (number): 3  Blood glucose trend: Increasing (reports numbers are getting better, trying to eat less and more active)  Date Breakfast  Lunch  Dinner  Bedtime    Before After Before After Before After    12/27 186  193  266     12/28 203   304 359     12/29 160  237  393     12/30 223  360  459     12/31 155  234  201     1/1 194  293  349     1/2 203               Taking Medications:  Diabetes Medication(s)     Insulin       insulin aspart (NOVOLOG FLEXPEN) 100 UNIT/ML pen    Inject 9 unit(s) with breakfast and 8 units with lunch/dinner before meals + pre meal correction 1u per 30 >130 (Max total dose of  16 units each injection for breakfast, lunch and dinner).     insulin glargine (LANTUS SOLOSTAR) 100 UNIT/ML pen    INJECT 27 UNITS SUBCUTANEOUSLY IN THE MORNING AND 19 UNITS IN THE EVENING. If fasting blood sugar is less than 80 reduce evening dose by 2 units          Taking Medication Assessed Today: Yes  Current Treatments: Diet, Insulin Injections  Dose schedule: Pre-breakfast, Pre-lunch, Pre-dinner, At bedtime (misses lunch dose frequently)  Given by: Patient  Problems taking diabetes medications regularly?: Yes (sometimes misses shots)  Diabetes medication side effects?: No    Problem Solving:  Problem Solving Assessed Today: Yes  Is the patient at risk for hypoglycemia?: Yes  Hypoglycemia Frequency: Rarely      Reducing Risks:  Reducing Risks Assessed Today: No    Healthy Coping:  Healthy Coping Assessed Today: Yes  Emotional response to diabetes: Ready to learn  Stage of change: ACTION (Actively working towards change)  Patient Activation Measure Survey Score:  NILSON Score (Last Two) 11/19/2010   NILSON Raw Score 37   Activation Score 49.9   NILSON Level 2         Care Plan and Education Provided:  Care Plan: Diabetes   Updates made by Cira Tolliver since 1/2/2023 12:00 AM      Problem: Diabetes Self-Management Education Needed to Optimize Self-Care Behaviors       Goal: Healthy Eating - follow a healthy eating pattern for diabetes       Task: Provide education on portion control and consistency in amount, composition and timing of food intake Completed 1/2/2023   Responsible User: Cira Tolliver      Goal: Taking Medication - patient is consistently taking medications as directed    This Visit's Progress: 90%   Recent Progress: 80%   Note:    I will take my insulin as prescribed     Goal: Problem Solving - know how to prevent and manage short-term diabetes complications       Task: Provide education on when to call a health care provider Completed 1/2/2023   Responsible User: Cira Tolliver  Unruly MITCHELL, LUMA, RUBEN        Time Spent: 13 minutes  Encounter Type: Individual    Any diabetes medication dose changes were made via the CDE Protocol per the patient's primary care provider. A copy of this encounter was shared with the provider.

## 2023-01-06 ENCOUNTER — LAB (OUTPATIENT)
Dept: LAB | Facility: CLINIC | Age: 78
End: 2023-01-06
Payer: COMMERCIAL

## 2023-01-06 DIAGNOSIS — E11.65 TYPE 2 DIABETES MELLITUS WITH HYPERGLYCEMIA, WITH LONG-TERM CURRENT USE OF INSULIN (H): ICD-10-CM

## 2023-01-06 DIAGNOSIS — Z79.4 TYPE 2 DIABETES MELLITUS WITH HYPERGLYCEMIA, WITH LONG-TERM CURRENT USE OF INSULIN (H): ICD-10-CM

## 2023-01-06 DIAGNOSIS — E78.5 HYPERLIPIDEMIA LDL GOAL <70: ICD-10-CM

## 2023-01-06 LAB
CHOLEST SERPL-MCNC: 125 MG/DL
HBA1C MFR BLD: 8.8 % (ref 0–5.6)
HDLC SERPL-MCNC: 43 MG/DL
LDLC SERPL CALC-MCNC: 55 MG/DL
NONHDLC SERPL-MCNC: 82 MG/DL
TRIGL SERPL-MCNC: 133 MG/DL

## 2023-01-06 PROCEDURE — 83036 HEMOGLOBIN GLYCOSYLATED A1C: CPT

## 2023-01-06 PROCEDURE — 80061 LIPID PANEL: CPT | Performed by: FAMILY MEDICINE

## 2023-01-06 PROCEDURE — 36415 COLL VENOUS BLD VENIPUNCTURE: CPT | Performed by: FAMILY MEDICINE

## 2023-01-19 DIAGNOSIS — Z79.4 TYPE 2 DIABETES MELLITUS WITH DIABETIC PERIPHERAL ANGIOPATHY WITHOUT GANGRENE, WITH LONG-TERM CURRENT USE OF INSULIN (H): Primary | ICD-10-CM

## 2023-01-19 DIAGNOSIS — Z79.4 TYPE 2 DIABETES MELLITUS WITH HYPERGLYCEMIA, WITH LONG-TERM CURRENT USE OF INSULIN (H): ICD-10-CM

## 2023-01-19 DIAGNOSIS — E11.51 TYPE 2 DIABETES MELLITUS WITH DIABETIC PERIPHERAL ANGIOPATHY WITHOUT GANGRENE, WITH LONG-TERM CURRENT USE OF INSULIN (H): Primary | ICD-10-CM

## 2023-01-19 DIAGNOSIS — E11.65 TYPE 2 DIABETES MELLITUS WITH HYPERGLYCEMIA, WITH LONG-TERM CURRENT USE OF INSULIN (H): ICD-10-CM

## 2023-01-23 RX ORDER — BLOOD SUGAR DIAGNOSTIC
STRIP MISCELLANEOUS
Qty: 300 STRIP | Refills: 2 | Status: SHIPPED | OUTPATIENT
Start: 2023-01-23 | End: 2023-05-03

## 2023-01-23 RX ORDER — PEN NEEDLE, DIABETIC 32GX 5/32"
NEEDLE, DISPOSABLE MISCELLANEOUS
Qty: 500 EACH | Refills: 2 | Status: SHIPPED | OUTPATIENT
Start: 2023-01-23 | End: 2024-02-19

## 2023-05-03 ENCOUNTER — OFFICE VISIT (OUTPATIENT)
Dept: FAMILY MEDICINE | Facility: CLINIC | Age: 78
End: 2023-05-03
Payer: COMMERCIAL

## 2023-05-03 VITALS
TEMPERATURE: 97.1 F | OXYGEN SATURATION: 98 % | SYSTOLIC BLOOD PRESSURE: 128 MMHG | BODY MASS INDEX: 39.37 KG/M2 | RESPIRATION RATE: 16 BRPM | DIASTOLIC BLOOD PRESSURE: 76 MMHG | HEIGHT: 70 IN | HEART RATE: 81 BPM | WEIGHT: 275 LBS

## 2023-05-03 DIAGNOSIS — E66.01 MORBID OBESITY DUE TO EXCESS CALORIES (H): ICD-10-CM

## 2023-05-03 DIAGNOSIS — N18.30 STAGE 3 CHRONIC KIDNEY DISEASE, UNSPECIFIED WHETHER STAGE 3A OR 3B CKD (H): ICD-10-CM

## 2023-05-03 DIAGNOSIS — R80.9 TYPE 2 DIABETES MELLITUS WITH MICROALBUMINURIA, WITH LONG-TERM CURRENT USE OF INSULIN (H): ICD-10-CM

## 2023-05-03 DIAGNOSIS — Z79.4 TYPE 2 DIABETES MELLITUS WITH MICROALBUMINURIA, WITH LONG-TERM CURRENT USE OF INSULIN (H): ICD-10-CM

## 2023-05-03 DIAGNOSIS — E11.65 TYPE 2 DIABETES MELLITUS WITH HYPERGLYCEMIA, WITH LONG-TERM CURRENT USE OF INSULIN (H): Primary | ICD-10-CM

## 2023-05-03 DIAGNOSIS — Z79.4 TYPE 2 DIABETES MELLITUS WITH HYPERGLYCEMIA, WITH LONG-TERM CURRENT USE OF INSULIN (H): Primary | ICD-10-CM

## 2023-05-03 DIAGNOSIS — E11.29 TYPE 2 DIABETES MELLITUS WITH MICROALBUMINURIA, WITH LONG-TERM CURRENT USE OF INSULIN (H): ICD-10-CM

## 2023-05-03 LAB — HBA1C MFR BLD: 8.7 % (ref 0–5.6)

## 2023-05-03 PROCEDURE — 99214 OFFICE O/P EST MOD 30 MIN: CPT | Performed by: FAMILY MEDICINE

## 2023-05-03 PROCEDURE — 83036 HEMOGLOBIN GLYCOSYLATED A1C: CPT | Performed by: FAMILY MEDICINE

## 2023-05-03 PROCEDURE — 36415 COLL VENOUS BLD VENIPUNCTURE: CPT | Performed by: FAMILY MEDICINE

## 2023-05-03 RX ORDER — BLOOD SUGAR DIAGNOSTIC
STRIP MISCELLANEOUS
Qty: 300 STRIP | Refills: 2 | Status: SHIPPED | OUTPATIENT
Start: 2023-05-03 | End: 2024-07-06

## 2023-05-03 RX ORDER — LIRAGLUTIDE 6 MG/ML
1.2 INJECTION SUBCUTANEOUS DAILY
Qty: 6 ML | Refills: 1 | Status: SHIPPED | OUTPATIENT
Start: 2023-05-03 | End: 2023-06-22

## 2023-05-03 ASSESSMENT — ENCOUNTER SYMPTOMS
MUSCULOSKELETAL NEGATIVE: 1
ALLERGIC/IMMUNOLOGIC NEGATIVE: 1
EYES NEGATIVE: 1
PSYCHIATRIC NEGATIVE: 1
ENDOCRINE NEGATIVE: 1
GASTROINTESTINAL NEGATIVE: 1
HEMATOLOGIC/LYMPHATIC NEGATIVE: 1
SHORTNESS OF BREATH: 1
NEUROLOGICAL NEGATIVE: 1
CONSTITUTIONAL NEGATIVE: 1

## 2023-05-03 ASSESSMENT — PAIN SCALES - GENERAL: PAINLEVEL: NO PAIN (0)

## 2023-05-03 NOTE — PROGRESS NOTES
"  Assessment & Plan     Type 2 diabetes mellitus with hyperglycemia, with long-term current use of insulin (H)  A1C was slightly better.  - Hemoglobin A1c  - blood glucose (ACCU-CHEK GUIDE) test strip; USE TO CHECK BLOOD GLUCOSE THREE TIMES DAILY  - liraglutide (VICTOZA) 18 MG/3ML solution; Inject 1.2 mg Subcutaneous daily for 30 days    Type 2 diabetes mellitus with microalbuminuria, with long-term current use of insulin (H)  Added Victoza . Recommend recheck in three months.    Morbid obesity due to excess calories (H)  Patient is encouraged to be more active.    Stage 3 chronic kidney disease, unspecified whether stage 3a or 3b CKD (H)  Stable.      BMI:   Estimated body mass index is 39.46 kg/m  as calculated from the following:    Height as of this encounter: 1.778 m (5' 10\").    Weight as of this encounter: 124.7 kg (275 lb).     Blood sugar testing frequency justification:  Uncontrolled diabetes  FUTURE APPOINTMENTS:       - Follow-up visit in three months.    Rama Michel MD  [facility]    Jackie Jacinto is a 77 year old, presenting for the following health issues:    Patient is a pleasant 77-year-old male here with his wife.  He comes in to have a recheck of his diabetes.  A1c in was slightly improved at 8.7 previous A1c was 8.8.  He reports that his blood sugars have been fluctuating they seem okay when he is very active and seem high when he is sedentary.    He is presently taking NovoLog scheduled  with breakfast 10 units , 9 units with lunch and a sliding scale for dinner.  He also takes Lantus 27 units.  His wife mentioned that his blood sugars are quite low in the morning. Fortunately he has not been symptomatic with this.  We discussed adjusting his medications.     I recommended trying a SGLT2 to see if this will help to control his diabetes .      I did mention that this could be costly.  Patient is open to trying.  He does plan to be a bit more active during " the summer he says he has a lot of gardening and plans to do a lot of gardening.        Blood pressure is within normal range.  Reviewed his medications he does not need med refills now.     I did fax some test strips which will be requested from him.  All questions answered.  Diabetes (Recheck on diabetes.  He states he is needing a refill of test strips.), Hypertension (Recheck on blood pressure.), and Imm/Inj (Reminded him that his second shingles vaccine is due through his Pharmacy.)        5/3/2023     8:15 AM   Additional Questions   Roomed by Mandy Walter CMA   Accompanied by Socorro-spouse     Chief Complaint   Patient presents with     Diabetes     Recheck on diabetes.  He states he is needing a refill of test strips.     Hypertension     Recheck on blood pressure.     Imm/Inj     Reminded him that his second shingles vaccine is due through his Pharmacy.       History of Present Illness       Diabetes:   He presents for follow up of diabetes.  He is checking home blood glucose three times daily. He checks blood glucose before meals.  Blood glucose is sometimes over 200 and sometimes under 70. He is aware of hypoglycemia symptoms including dizziness. He is concerned about blood sugar frequently over 200.  He is having weight gain. The patient has not had a diabetic eye exam in the last 12 months.         Hypertension: He presents for follow up of hypertension.  He does check blood pressure  regularly outside of the clinic. Outside blood pressures have been over 140/90. He follows a low salt diet.     He eats 2-3 servings of fruits and vegetables daily.He consumes 0 sweetened beverage(s) daily.He exercises with enough effort to increase his heart rate 30 to 60 minutes per day.  He exercises with enough effort to increase his heart rate 4 days per week.   He is taking medications regularly.             Review of Systems   Constitutional: Negative.    HENT: Negative.    Eyes: Negative.    Respiratory: Positive  "for shortness of breath.    Cardiovascular: Positive for peripheral edema.   Gastrointestinal: Negative.    Endocrine: Negative.    Musculoskeletal: Negative.    Allergic/Immunologic: Negative.    Neurological: Negative.    Hematological: Negative.    Psychiatric/Behavioral: Negative.             Objective    /76 (BP Location: Right arm, Patient Position: Chair, Cuff Size: Adult Large)   Pulse 81   Temp 97.1  F (36.2  C) (Tympanic)   Resp 16   Ht 1.778 m (5' 10\")   Wt 124.7 kg (275 lb)   SpO2 98%   BMI 39.46 kg/m    Body mass index is 39.46 kg/m .  Physical Exam  Constitutional:       Appearance: Normal appearance.   HENT:      Head: Normocephalic and atraumatic.   Cardiovascular:      Rate and Rhythm: Normal rate and regular rhythm.      Pulses:           Dorsalis pedis pulses are 3+ on the right side and 3+ on the left side.   Pulmonary:      Effort: Pulmonary effort is normal. No respiratory distress.      Breath sounds: Normal breath sounds. No wheezing, rhonchi or rales.   Chest:      Chest wall: No tenderness.   Abdominal:      General: Abdomen is flat. Bowel sounds are normal.      Palpations: Abdomen is soft.   Musculoskeletal:         General: Normal range of motion.      Right lower leg: Edema present.      Left lower leg: Edema present.   Feet:      Right foot:      Protective Sensation: 5 sites tested. 5 sites sensed.      Skin integrity: Dry skin present.      Toenail Condition: Right toenails are abnormally thick.      Left foot:      Protective Sensation: 5 sites tested. 5 sites sensed.      Skin integrity: Dry skin present.      Toenail Condition: Left toenails are abnormally thick.   Skin:     General: Skin is warm and dry.   Neurological:      Mental Status: He is alert and oriented to person, place, and time.   Psychiatric:         Mood and Affect: Mood normal.         Behavior: Behavior normal.            Results for orders placed or performed in visit on 05/03/23   Hemoglobin A1c    "  Status: Abnormal   Result Value Ref Range    Hemoglobin A1C 8.7 (H) 0.0 - 5.6 %

## 2023-05-28 ENCOUNTER — MYC MEDICAL ADVICE (OUTPATIENT)
Dept: FAMILY MEDICINE | Facility: CLINIC | Age: 78
End: 2023-05-28
Payer: COMMERCIAL

## 2023-05-30 NOTE — TELEPHONE ENCOUNTER
He can drop his morning insulin to 24 units in the morning . Also patient to space out when he is taking the victoza and the insulin and not take it together.

## 2023-05-30 NOTE — TELEPHONE ENCOUNTER
Patient was notified to reduce his insulin to 24 units in the am and not take the Victoza and insulin at the same time.  Patient and wife verbalized understanding.

## 2023-06-22 DIAGNOSIS — E11.65 TYPE 2 DIABETES MELLITUS WITH HYPERGLYCEMIA, WITH LONG-TERM CURRENT USE OF INSULIN (H): ICD-10-CM

## 2023-06-22 DIAGNOSIS — Z79.4 TYPE 2 DIABETES MELLITUS WITH HYPERGLYCEMIA, WITH LONG-TERM CURRENT USE OF INSULIN (H): ICD-10-CM

## 2023-06-22 RX ORDER — LIRAGLUTIDE 6 MG/ML
INJECTION SUBCUTANEOUS
Qty: 6 ML | Refills: 0 | Status: SHIPPED | OUTPATIENT
Start: 2023-06-22 | End: 2023-08-01

## 2023-07-09 ENCOUNTER — HEALTH MAINTENANCE LETTER (OUTPATIENT)
Age: 78
End: 2023-07-09

## 2023-07-31 DIAGNOSIS — Z79.4 TYPE 2 DIABETES MELLITUS WITH HYPERGLYCEMIA, WITH LONG-TERM CURRENT USE OF INSULIN (H): ICD-10-CM

## 2023-07-31 DIAGNOSIS — E11.65 TYPE 2 DIABETES MELLITUS WITH HYPERGLYCEMIA, WITH LONG-TERM CURRENT USE OF INSULIN (H): ICD-10-CM

## 2023-07-31 NOTE — TELEPHONE ENCOUNTER
Pending Prescriptions:                       Disp   Refills    VICTOZA PEN 18 MG/3ML soln [Pharmacy Med *6 mL   0            Sig: INJECT 1.2MG SUBCUTANEOUSLY ONCE DAILY

## 2023-08-01 RX ORDER — LIRAGLUTIDE 6 MG/ML
INJECTION SUBCUTANEOUS
Qty: 6 ML | Refills: 0 | Status: SHIPPED | OUTPATIENT
Start: 2023-08-01 | End: 2023-08-23

## 2023-08-22 ENCOUNTER — TELEPHONE (OUTPATIENT)
Dept: FAMILY MEDICINE | Facility: CLINIC | Age: 78
End: 2023-08-22
Payer: COMMERCIAL

## 2023-08-22 DIAGNOSIS — Z79.4 TYPE 2 DIABETES MELLITUS WITH HYPERGLYCEMIA, WITH LONG-TERM CURRENT USE OF INSULIN (H): ICD-10-CM

## 2023-08-22 DIAGNOSIS — E11.65 TYPE 2 DIABETES MELLITUS WITH HYPERGLYCEMIA, WITH LONG-TERM CURRENT USE OF INSULIN (H): ICD-10-CM

## 2023-08-23 DIAGNOSIS — Z79.4 TYPE 2 DIABETES MELLITUS WITH HYPERGLYCEMIA, WITH LONG-TERM CURRENT USE OF INSULIN (H): Primary | ICD-10-CM

## 2023-08-23 DIAGNOSIS — E11.65 TYPE 2 DIABETES MELLITUS WITH HYPERGLYCEMIA, WITH LONG-TERM CURRENT USE OF INSULIN (H): Primary | ICD-10-CM

## 2023-08-23 RX ORDER — LIRAGLUTIDE 6 MG/ML
INJECTION SUBCUTANEOUS
Qty: 6 ML | Refills: 0 | Status: SHIPPED | OUTPATIENT
Start: 2023-08-23 | End: 2023-09-29

## 2023-09-08 ENCOUNTER — LAB (OUTPATIENT)
Dept: LAB | Facility: CLINIC | Age: 78
End: 2023-09-08
Payer: COMMERCIAL

## 2023-09-08 DIAGNOSIS — Z79.4 TYPE 2 DIABETES MELLITUS WITH HYPERGLYCEMIA, WITH LONG-TERM CURRENT USE OF INSULIN (H): ICD-10-CM

## 2023-09-08 DIAGNOSIS — N18.32 CHRONIC KIDNEY DISEASE, STAGE 3B (H): Primary | ICD-10-CM

## 2023-09-08 DIAGNOSIS — E11.65 TYPE 2 DIABETES MELLITUS WITH HYPERGLYCEMIA, WITH LONG-TERM CURRENT USE OF INSULIN (H): ICD-10-CM

## 2023-09-08 LAB
ANION GAP SERPL CALCULATED.3IONS-SCNC: 12 MMOL/L (ref 7–15)
BUN SERPL-MCNC: 24.5 MG/DL (ref 8–23)
CALCIUM SERPL-MCNC: 9.4 MG/DL (ref 8.8–10.2)
CHLORIDE SERPL-SCNC: 101 MMOL/L (ref 98–107)
CREAT SERPL-MCNC: 1.22 MG/DL (ref 0.67–1.17)
DEPRECATED HCO3 PLAS-SCNC: 25 MMOL/L (ref 22–29)
EGFRCR SERPLBLD CKD-EPI 2021: 60 ML/MIN/1.73M2
GLUCOSE SERPL-MCNC: 126 MG/DL (ref 70–99)
HBA1C MFR BLD: 7.4 % (ref 0–5.6)
HGB BLD-MCNC: 14.8 G/DL (ref 13.3–17.7)
POTASSIUM SERPL-SCNC: 4.9 MMOL/L (ref 3.4–5.3)
PTH-INTACT SERPL-MCNC: 141 PG/ML (ref 15–65)
SODIUM SERPL-SCNC: 138 MMOL/L (ref 136–145)

## 2023-09-08 PROCEDURE — 83036 HEMOGLOBIN GLYCOSYLATED A1C: CPT

## 2023-09-08 PROCEDURE — 83970 ASSAY OF PARATHORMONE: CPT

## 2023-09-08 PROCEDURE — 36415 COLL VENOUS BLD VENIPUNCTURE: CPT

## 2023-09-08 PROCEDURE — 80048 BASIC METABOLIC PNL TOTAL CA: CPT

## 2023-09-08 PROCEDURE — 85018 HEMOGLOBIN: CPT

## 2023-09-11 DIAGNOSIS — R79.89 ELEVATED PARATHYROID HORMONE: Primary | ICD-10-CM

## 2023-09-11 NOTE — RESULT ENCOUNTER NOTE
Please inform patient that test result was within normal parameters in particular the diabetes is doing much better. A1C is at 7.4 from 8.7. kidneys are not doing too well. Recommend hydration.  Parathyroid labs were elevated. I will recommend seeing the endocrinologist for the high parathyroid hormones. Referral placed.   Thank you.     Rama Michel M.D.

## 2023-09-12 ENCOUNTER — TELEPHONE (OUTPATIENT)
Dept: ENDOCRINOLOGY | Facility: CLINIC | Age: 78
End: 2023-09-12
Payer: COMMERCIAL

## 2023-09-12 ENCOUNTER — TELEPHONE (OUTPATIENT)
Dept: FAMILY MEDICINE | Facility: CLINIC | Age: 78
End: 2023-09-12
Payer: COMMERCIAL

## 2023-09-12 NOTE — TELEPHONE ENCOUNTER
FYI - Status Update    Who is Calling: patient    Update: Was told to make oncology appointment but cannot get in until may. Looking for answers and plan of action.     Does caller want a call/response back: Yes     Could we send this information to you in [facility] or would you prefer to receive a phone call?:   Patient would prefer a phone call   Okay to leave a detailed message?: Yes at Home number on file [phone] (home)

## 2023-09-12 NOTE — TELEPHONE ENCOUNTER
[facility] Call Center    Phone Message    May a detailed message be left on voicemail: yes     Reason for Call: Other: Pt being referred for Elevated parathyroid hormone and no appt is availavble in 2 weeks

## 2023-09-13 NOTE — TELEPHONE ENCOUNTER
Wife returns call.  It is for endocrinology not oncology. I have [facility] him [phone]  Wife goes to [facility] and wants to know if she can get him an endocrinology appt if its ok.  Yes, let us know and we can do a referral if needed. Emelia ROBERT RN

## 2023-09-13 NOTE — TELEPHONE ENCOUNTER
Left message for the patient to call the clinic.  The message says oncology but I see endocrinology referral. Emelia ROBERT RN

## 2023-09-27 DIAGNOSIS — Z79.4 TYPE 2 DIABETES MELLITUS WITH HYPERGLYCEMIA, WITH LONG-TERM CURRENT USE OF INSULIN (H): ICD-10-CM

## 2023-09-27 DIAGNOSIS — E11.65 TYPE 2 DIABETES MELLITUS WITH HYPERGLYCEMIA, WITH LONG-TERM CURRENT USE OF INSULIN (H): ICD-10-CM

## 2023-09-28 NOTE — TELEPHONE ENCOUNTER
"Requested Prescriptions   Pending Prescriptions Disp Refills    VICTOZA PEN 18 MG/3ML soln [Pharmacy Med Name: Victoza 18 MG/3ML Subcutaneous Solution Pen-injector] 6 mL 0     Sig: INJECT 1.2MG SUBCUTANEOUSLY ONCE DAILY       GLP-1 Agonists Protocol Failed - 9/27/2023  1:13 PM        Failed - Normal serum creatinine on file in past 12 months     Recent Labs   Lab Test 09/08/23  0647   CR 1.22*       Ok to refill medication if creatinine is low          Passed - HgbA1C in past 3 or 6 months     If HgbA1C is 8 or greater, it needs to be on file within the past 3 months.  If less than 8, must be on file within the past 6 months.     Recent Labs   Lab Test 09/08/23  0647   A1C 7.4*             Passed - Medication is active on med list        Passed - Patient is age 18 or older        Passed - Recent (6 mo) or future (30 days) visit within the authorizing provider's specialty     Patient had office visit in the last 6 months or has a visit in the next 30 days with authorizing provider.  See \"Patient Info\" tab in inbasket, or \"Choose Columns\" in Meds & Orders section of the refill encounter.                 "

## 2023-09-29 RX ORDER — LIRAGLUTIDE 6 MG/ML
INJECTION SUBCUTANEOUS
Qty: 6 ML | Refills: 0 | Status: SHIPPED | OUTPATIENT
Start: 2023-09-29 | End: 2023-10-31

## 2023-10-18 ENCOUNTER — TELEPHONE (OUTPATIENT)
Dept: FAMILY MEDICINE | Facility: CLINIC | Age: 78
End: 2023-10-18
Payer: COMMERCIAL

## 2023-10-18 NOTE — TELEPHONE ENCOUNTER
General Call    Contacts         Type Contact Phone/Fax    10/18/2023 04:06 PM CDT Phone (Incoming) Orville Plasencia (Self) [phone] (H)          Reason for Call:  Pt's wife called and is requesting a thyroid lab test to be added to pt's lab appt on 10/26. Can you call pt to confirm? Thank you!    Could we send this information to you in [facility] or would you prefer to receive a phone call?:   Patient would prefer a phone call   Okay to leave a detailed message?: Yes at Home number on file [phone] (home)

## 2023-10-18 NOTE — TELEPHONE ENCOUNTER
Routed to provider.  Pt is seeing endocrinology for elevated PTH.  Has upcoming lab appt and wife asks if thyroid lab work needs to be completed too?  Gerda Beasley RN

## 2023-10-24 ENCOUNTER — TELEPHONE (OUTPATIENT)
Dept: FAMILY MEDICINE | Facility: CLINIC | Age: 78
End: 2023-10-24
Payer: COMMERCIAL

## 2023-10-24 DIAGNOSIS — R79.89 ELEVATED PARATHYROID HORMONE: Primary | ICD-10-CM

## 2023-10-24 NOTE — TELEPHONE ENCOUNTER
Order/Referral Request    Who is requesting: patient requesting referral to endocrinologist at [facility] in [place]. 's endo is too far to drive in [place].  [place] endo     Dr howard mclean, [place]    Reason service is needed/diagnosis: due to lab; thyriod    When are orders needed by: asao    Does patient have a preference on a Group/Provider/Facility? [facility]    Does patient have an appointment scheduled?: No    Where to send orders: Fax [phone]    Could we [facility] this information to you [facility] or would you prefer to receive a phone call?:   Patient would prefer a phone call   Okay to leave a detailed message?: Yes at Cell number on file:    Telephone Information:   Mobile [phone]

## 2023-10-24 NOTE — TELEPHONE ENCOUNTER
Spoke with patient and informed him to follow up with endocrinology per provider recommendation.    Ana Willson, RN

## 2023-10-25 ENCOUNTER — DOCUMENTATION ONLY (OUTPATIENT)
Dept: FAMILY MEDICINE | Facility: CLINIC | Age: 78
End: 2023-10-25
Payer: COMMERCIAL

## 2023-10-25 NOTE — PROGRESS NOTES
Please order labs for 10-26 appointment. Thanks, Makeda Fuller   The only lab is for a urine microalbumin.

## 2023-10-26 ENCOUNTER — TELEPHONE (OUTPATIENT)
Dept: FAMILY MEDICINE | Facility: CLINIC | Age: 78
End: 2023-10-26

## 2023-10-26 NOTE — TELEPHONE ENCOUNTER
Spoke with Socorro/wife, MANA on file. She says that she's already picked up a copy, and the pt was able to get an appt for tomorrow.    Kierra Reyna RN  [facility]

## 2023-10-26 NOTE — TELEPHONE ENCOUNTER
Patient's wife called back and she can not see the referral for endocrinology that was put in yesterday.  Patients wife will be coming over to the clinic to  a hard copy.          Radha Arambula on 10/26/2023 at 9:28 AM

## 2023-10-26 NOTE — PROGRESS NOTES
Called & spoke with pt's wife Socorro (C2C on file) & she states she cancelled the lab appt for today. Pt will be seeing an endocrinologist at [facility]. Dr Michel put in a referral yesterday. Pt did not see this yesterday. She will check in [employer] today & if she cannot see she will call back for us to print & she will  at .    Bhavani Rasmussen RN

## 2023-10-31 DIAGNOSIS — E11.65 TYPE 2 DIABETES MELLITUS WITH HYPERGLYCEMIA, WITH LONG-TERM CURRENT USE OF INSULIN (H): ICD-10-CM

## 2023-10-31 DIAGNOSIS — Z79.4 TYPE 2 DIABETES MELLITUS WITH HYPERGLYCEMIA, WITH LONG-TERM CURRENT USE OF INSULIN (H): ICD-10-CM

## 2023-10-31 RX ORDER — LIRAGLUTIDE 6 MG/ML
INJECTION SUBCUTANEOUS
Qty: 6 ML | Refills: 0 | Status: SHIPPED | OUTPATIENT
Start: 2023-10-31 | End: 2023-11-29

## 2023-11-28 DIAGNOSIS — I10 ESSENTIAL HYPERTENSION: ICD-10-CM

## 2023-11-29 NOTE — TELEPHONE ENCOUNTER
Pending Prescriptions:                       Disp   Refills    losartan (COZAAR) 50 MG tablet [Pharmacy *180 ta*0            Sig: Take 2 tablets by mouth once daily    Routing refill request to provider for review/approval because:  Labs out of range:    Creatinine   Date Value Ref Range Status   09/08/2023 1.22 (H) 0.67 - 1.17 mg/dL Final   05/06/2021 0.97 0.66 - 1.25 mg/dL Final     Valente Banks RN

## 2023-12-01 ENCOUNTER — TELEPHONE (OUTPATIENT)
Dept: FAMILY MEDICINE | Facility: CLINIC | Age: 78
End: 2023-12-01
Payer: COMMERCIAL

## 2023-12-01 RX ORDER — LOSARTAN POTASSIUM 50 MG/1
100 TABLET ORAL DAILY
Qty: 180 TABLET | Refills: 0 | Status: SHIPPED | OUTPATIENT
Start: 2023-12-01 | End: 2024-01-18

## 2023-12-01 NOTE — TELEPHONE ENCOUNTER
Symptoms    Describe your symptoms: Patient has bad cough,tested negative for Covid, so far. Wife has Covid. Patient is taking Coricidin HR, but it's not helping at all.    Any pain: Yes:     How long have you been having symptoms: Days      Have you been seen for this:  No    Appointment offered?: Yes:     Triage offered?: Yes:     Home remedies tried: Coricidin HR    Preferred Pharmacy:   [facility] [address] [place] [address] [id number]  [address] [place] [address]  Phone: [phone] Fax: [phone]      Could we send this information to you in [facility] or would you prefer to receive a phone call?:   Patient would prefer a phone call   Okay to leave a detailed message?: Yes at Home number on file [phone] (home)

## 2023-12-01 NOTE — TELEPHONE ENCOUNTER
Wife Socorro with Orville in the background states that Orville has a horrific cough that sounds dry to her and is nonproductive. She does not hear any wheezing with this and at this time does not have any shortness of breath or a fever. Socorro tested positive for covid and Orville has been negative so far but she is really concerned about his cough. Advised to be seen in urgent care yet today and to wear a mask when coming in. They agree with this plan.    Day Kearney RN

## 2023-12-09 ENCOUNTER — TRANSFERRED RECORDS (OUTPATIENT)
Dept: HEALTH INFORMATION MANAGEMENT | Facility: CLINIC | Age: 78
End: 2023-12-09

## 2023-12-09 LAB
ALT SERPL-CCNC: 24 U/L (ref 0–45)
AST SERPL-CCNC: 22 U/L (ref 0–41)
CHOLESTEROL (EXTERNAL): 140 MG/DL
CREATININE (EXTERNAL): 1.3 MG/DL (ref 0.6–1.5)
GLUCOSE (EXTERNAL): 267 MG/DL (ref 70–99)
HBA1C MFR BLD: 7.4 % (ref 3–6)
HDLC SERPL-MCNC: 36 MG/DL
LDL CHOLESTEROL CALCULATED (EXTERNAL): 62 MG/DL
POTASSIUM (EXTERNAL): 5.4 MEQ/L (ref 3.5–5.3)
TRIGLYCERIDES (EXTERNAL): 210 MG/DL
TSH SERPL-ACNC: 1.37 UIU/ML (ref 0.35–5.5)

## 2023-12-18 ENCOUNTER — TELEPHONE (OUTPATIENT)
Dept: FAMILY MEDICINE | Facility: CLINIC | Age: 78
End: 2023-12-18
Payer: COMMERCIAL

## 2023-12-18 NOTE — TELEPHONE ENCOUNTER
Called and spoke with pt, pt has all upcoming appointments scheduled and has no further questions at this time.    JAZMIN Perez.

## 2023-12-18 NOTE — TELEPHONE ENCOUNTER
Order/Referral Request    Who is requesting: pt spouse    Orders being requested: every 3 month labs    Reason service is needed/diagnosis: diabetes    When are orders needed by: 1/15/24    Has this been discussed with Provider: No    Does patient have a preference on a Group/Provider/Facility? [facility]    Does patient have an appointment scheduled?: Yes:1/15/24    Where to send orders: Place orders within Epic    Could we send this information to you in [facility] or would you prefer to receive a phone call?:   Patient would prefer a phone call   Okay to leave a detailed message?: Yes at Home number on file [phone] (home)

## 2023-12-19 ENCOUNTER — IMMUNIZATION (OUTPATIENT)
Dept: FAMILY MEDICINE | Facility: CLINIC | Age: 78
End: 2023-12-19
Payer: COMMERCIAL

## 2023-12-19 DIAGNOSIS — Z23 ENCOUNTER FOR IMMUNIZATION: Primary | ICD-10-CM

## 2023-12-19 DIAGNOSIS — Z23 NEED FOR SHINGLES VACCINE: ICD-10-CM

## 2023-12-19 DIAGNOSIS — Z29.11 NEED FOR VACCINATION AGAINST RESPIRATORY SYNCYTIAL VIRUS: ICD-10-CM

## 2023-12-19 PROCEDURE — 90480 ADMN SARSCOV2 VAC 1/ONLY CMP: CPT

## 2023-12-19 PROCEDURE — G0008 ADMIN INFLUENZA VIRUS VAC: HCPCS

## 2023-12-19 PROCEDURE — 90662 IIV NO PRSV INCREASED AG IM: CPT

## 2023-12-19 PROCEDURE — 99207 PR NO CHARGE NURSE ONLY: CPT

## 2023-12-19 PROCEDURE — 91320 SARSCV2 VAC 30MCG TRS-SUC IM: CPT

## 2023-12-19 NOTE — PROGRESS NOTES
Prior to immunization administration, verified patients identity using patient s name and date of birth. Please see Immunization Activity for additional information.     Screening Questionnaire for Adult Immunization    Are you sick today?   No   Do you have allergies to medications, food, a vaccine component or latex?   No   Have you ever had a serious reaction after receiving a vaccination?   No   Do you have a long-term health problem with heart, lung, kidney, or metabolic disease (e.g., diabetes), asthma, a blood disorder, no spleen, complement component deficiency, a cochlear implant, or a spinal fluid leak?  Are you on long-term aspirin therapy?   No   Do you have cancer, leukemia, HIV/AIDS, or any other immune system problem?   No   Do you have a parent, brother, or sister with an immune system problem?   No   In the past 3 months, have you taken medications that affect  your immune system, such as prednisone, other steroids, or anticancer drugs; drugs for the treatment of rheumatoid arthritis, Crohn s disease, or psoriasis; or have you had radiation treatments?   No   Have you had a seizure, or a brain or other nervous system problem?   No   During the past year, have you received a transfusion of blood or blood    products, or been given immune (gamma) globulin or antiviral drug?   No   For women: Are you pregnant or is there a chance you could become       pregnant during the next month?   No   Have you received any vaccinations in the past 4 weeks?   No     Immunization questionnaire answers were all negative.    I have reviewed the following standing orders:   This patient is due and qualifies for the Covid-19 vaccine.     Click here for COVID-19 Standing Order    I have reviewed the vaccines inclusion and exclusion criteria; No concerns regarding eligibility.     This patient is due and qualifies for the Influenza vaccine.    Click here for Influenza Vaccine Standing Order    I have reviewed the  vaccines inclusion and exclusion criteria; No concerns regarding eligibility.     Patient instructed to remain in clinic for 15 minutes afterwards, and to report any adverse reactions.     Screening performed by Sondra SUMMERS LPN on 12/19/2023 at 8:08 AM.

## 2024-01-04 DIAGNOSIS — Z79.4 TYPE 2 DIABETES MELLITUS WITH HYPERGLYCEMIA, WITH LONG-TERM CURRENT USE OF INSULIN (H): ICD-10-CM

## 2024-01-04 DIAGNOSIS — E11.65 TYPE 2 DIABETES MELLITUS WITH HYPERGLYCEMIA, WITH LONG-TERM CURRENT USE OF INSULIN (H): ICD-10-CM

## 2024-01-05 RX ORDER — LIRAGLUTIDE 6 MG/ML
INJECTION SUBCUTANEOUS
Qty: 6 ML | Refills: 0 | Status: SHIPPED | OUTPATIENT
Start: 2024-01-05 | End: 2024-01-18 | Stop reason: DRUGHIGH

## 2024-01-05 NOTE — TELEPHONE ENCOUNTER
"Has appointment scheduled for 1/18/24      Requested Prescriptions   Pending Prescriptions Disp Refills    VICTOZA PEN 18 MG/3ML soln [Pharmacy Med Name: Victoza 18 MG/3ML Subcutaneous Solution Pen-injector] 6 mL 0     Sig: INJECT 1.2MG SUBCUTANEOUSLY ONCE DAILY. NEED APPOINTMENT FOR REFILLS       GLP-1 Agonists Protocol Failed - 1/4/2024  9:41 PM        Failed - Normal serum creatinine on file in past 12 months     Recent Labs   Lab Test 09/08/23  0647   CR 1.22*       Ok to refill medication if creatinine is low          Passed - HgbA1C in past 3 or 6 months     If HgbA1C is 8 or greater, it needs to be on file within the past 3 months.  If less than 8, must be on file within the past 6 months.     Recent Labs   Lab Test 09/08/23  0647   A1C 7.4*             Passed - Medication is active on med list        Passed - Patient is age 18 or older        Passed - Recent (6 mo) or future (30 days) visit within the authorizing provider's specialty     Patient had office visit in the last 6 months or has a visit in the next 30 days with authorizing provider.  See \"Patient Info\" tab in inbasket, or \"Choose Columns\" in Meds & Orders section of the refill encounter.                 "

## 2024-01-11 DIAGNOSIS — E78.5 HYPERLIPIDEMIA LDL GOAL <70: ICD-10-CM

## 2024-01-11 DIAGNOSIS — Z79.4 TYPE 2 DIABETES MELLITUS WITH HYPERGLYCEMIA, WITH LONG-TERM CURRENT USE OF INSULIN (H): Primary | ICD-10-CM

## 2024-01-11 DIAGNOSIS — E11.65 TYPE 2 DIABETES MELLITUS WITH HYPERGLYCEMIA, WITH LONG-TERM CURRENT USE OF INSULIN (H): Primary | ICD-10-CM

## 2024-01-11 NOTE — PROGRESS NOTES
Orville Mcdonnell has an upcoming lab appointment with us. Can you please place orders for him.  Thank you,  [facility]

## 2024-01-12 DIAGNOSIS — Z79.4 TYPE 2 DIABETES MELLITUS WITH HYPERGLYCEMIA, WITH LONG-TERM CURRENT USE OF INSULIN (H): ICD-10-CM

## 2024-01-12 DIAGNOSIS — E11.65 TYPE 2 DIABETES MELLITUS WITH HYPERGLYCEMIA, WITH LONG-TERM CURRENT USE OF INSULIN (H): ICD-10-CM

## 2024-01-15 ENCOUNTER — LAB (OUTPATIENT)
Dept: LAB | Facility: CLINIC | Age: 79
End: 2024-01-15
Payer: COMMERCIAL

## 2024-01-15 DIAGNOSIS — Z79.4 TYPE 2 DIABETES MELLITUS WITH HYPERGLYCEMIA, WITH LONG-TERM CURRENT USE OF INSULIN (H): ICD-10-CM

## 2024-01-15 DIAGNOSIS — E78.5 HYPERLIPIDEMIA LDL GOAL <70: ICD-10-CM

## 2024-01-15 DIAGNOSIS — E11.65 TYPE 2 DIABETES MELLITUS WITH HYPERGLYCEMIA, WITH LONG-TERM CURRENT USE OF INSULIN (H): ICD-10-CM

## 2024-01-15 LAB
CHOLEST SERPL-MCNC: 121 MG/DL
FASTING STATUS PATIENT QL REPORTED: YES
HBA1C MFR BLD: 8.1 % (ref 0–5.6)
HDLC SERPL-MCNC: 37 MG/DL
LDLC SERPL CALC-MCNC: 54 MG/DL
NONHDLC SERPL-MCNC: 84 MG/DL
TRIGL SERPL-MCNC: 150 MG/DL

## 2024-01-15 PROCEDURE — 80061 LIPID PANEL: CPT

## 2024-01-15 PROCEDURE — 83036 HEMOGLOBIN GLYCOSYLATED A1C: CPT

## 2024-01-15 PROCEDURE — 36415 COLL VENOUS BLD VENIPUNCTURE: CPT

## 2024-01-15 RX ORDER — INSULIN ASPART 100 [IU]/ML
INJECTION, SOLUTION INTRAVENOUS; SUBCUTANEOUS
Qty: 45 ML | Refills: 1 | Status: SHIPPED | OUTPATIENT
Start: 2024-01-15

## 2024-01-18 ENCOUNTER — OFFICE VISIT (OUTPATIENT)
Dept: FAMILY MEDICINE | Facility: CLINIC | Age: 79
End: 2024-01-18
Payer: COMMERCIAL

## 2024-01-18 VITALS
WEIGHT: 270 LBS | HEIGHT: 70 IN | HEART RATE: 83 BPM | SYSTOLIC BLOOD PRESSURE: 142 MMHG | RESPIRATION RATE: 18 BRPM | DIASTOLIC BLOOD PRESSURE: 90 MMHG | OXYGEN SATURATION: 96 % | BODY MASS INDEX: 38.65 KG/M2 | TEMPERATURE: 97.8 F

## 2024-01-18 DIAGNOSIS — I10 HYPERTENSION, UNSPECIFIED TYPE: ICD-10-CM

## 2024-01-18 DIAGNOSIS — E11.29 TYPE 2 DIABETES MELLITUS WITH MICROALBUMINURIA, WITH LONG-TERM CURRENT USE OF INSULIN (H): Primary | ICD-10-CM

## 2024-01-18 DIAGNOSIS — R80.9 TYPE 2 DIABETES MELLITUS WITH MICROALBUMINURIA, WITH LONG-TERM CURRENT USE OF INSULIN (H): Primary | ICD-10-CM

## 2024-01-18 DIAGNOSIS — Z79.4 TYPE 2 DIABETES MELLITUS WITH HYPERGLYCEMIA, WITH LONG-TERM CURRENT USE OF INSULIN (H): ICD-10-CM

## 2024-01-18 DIAGNOSIS — Z79.4 TYPE 2 DIABETES MELLITUS WITH MICROALBUMINURIA, WITH LONG-TERM CURRENT USE OF INSULIN (H): Primary | ICD-10-CM

## 2024-01-18 DIAGNOSIS — E11.65 TYPE 2 DIABETES MELLITUS WITH HYPERGLYCEMIA, WITH LONG-TERM CURRENT USE OF INSULIN (H): ICD-10-CM

## 2024-01-18 DIAGNOSIS — N40.0 BENIGN NON-NODULAR PROSTATIC HYPERPLASIA: ICD-10-CM

## 2024-01-18 PROCEDURE — 99214 OFFICE O/P EST MOD 30 MIN: CPT | Performed by: FAMILY MEDICINE

## 2024-01-18 RX ORDER — AMLODIPINE BESYLATE 5 MG/1
7.5 TABLET ORAL DAILY
Qty: 135 TABLET | Refills: 3 | Status: SHIPPED | OUTPATIENT
Start: 2024-01-18 | End: 2024-06-18

## 2024-01-18 RX ORDER — LIRAGLUTIDE 6 MG/ML
1.8 INJECTION SUBCUTANEOUS DAILY
Qty: 9 ML | Refills: 3 | Status: SHIPPED | OUTPATIENT
Start: 2024-01-18 | End: 2024-06-13

## 2024-01-18 RX ORDER — LOSARTAN POTASSIUM 50 MG/1
100 TABLET ORAL DAILY
Qty: 180 TABLET | Refills: 3 | Status: SHIPPED | OUTPATIENT
Start: 2024-01-18

## 2024-01-18 RX ORDER — TAMSULOSIN HYDROCHLORIDE 0.4 MG/1
CAPSULE ORAL
Qty: 180 CAPSULE | Refills: 3 | Status: SHIPPED | OUTPATIENT
Start: 2024-01-18

## 2024-01-18 ASSESSMENT — PAIN SCALES - GENERAL: PAINLEVEL: NO PAIN (0)

## 2024-01-18 NOTE — PROGRESS NOTES
"    78 yr old male here for recheck of diabetes.   Adjusted dose of Victoza- recommend recheck in three months  Blood pressure still slightly high- adjusted amlodipine to 7.5 mg  Assessment & Plan   Problem List Items Addressed This Visit       Type 2 diabetes mellitus with hyperglycemia (H)    Relevant Medications    liraglutide (VICTOZA) 18 MG/3ML solution    Type 2 diabetes mellitus with microalbuminuria, with long-term current use of insulin (H) - Primary    Relevant Medications    liraglutide (VICTOZA) 18 MG/3ML solution    Other Relevant Orders    **Hemoglobin A1c FUTURE 3mo     Other Visit Diagnoses       Hypertension, unspecified type        Relevant Medications    amLODIPine (NORVASC) 5 MG tablet    losartan (COZAAR) 50 MG tablet    Benign non-nodular prostatic hyperplasia        Relevant Medications    tamsulosin (FLOMAX) 0.4 MG capsule             Review of external notes as documented elsewhere in note  30 minutes spent by me on the date of the encounter doing chart review, review of outside records, review of test results, patient visit, documentation, and discussion with family      BMI  Estimated body mass index is 38.74 kg/m  as calculated from the following:    Height as of this encounter: 1.778 m (5' 10\").    Weight as of this encounter: 122.5 kg (270 lb).   Weight management plan: Discussed healthy diet and exercise guidelines  Blood sugar testing frequency justification:  Adjustment of medication(s)  FUTURE APPOINTMENTS:       - Follow-up visit in 3 months.      Jackie Jacinto is a 78 year old, presenting for the following health issues:    78 yr old male here for recheck of his diabetes     A1C was slightly higher than last check. Patient is on insulin, ( Liraglutide) , we talked about increasing his Victoza to 1.8 mg and he appears open to this.   His blood pressure is also somewhat high- recommend increasing the amlodipine dose to 7.5 mg.   He is requesting refills on the Flomax.  Diabetes " (Recheck on diabetes.), Hypertension (Recheck on blood pressure.), Benign Prostatic Hypertrophy (Recheck on medication. ), Imm/Inj (He will do the RSV and second Shingles through his Pharmacy.), and Outside results (He had testing done through his [facility] in December.  [facility].  They brought those in today to review.)        1/18/2024    11:17 AM   Additional Questions   Roomed by Mandy Walter CMA   Accompanied by Socorro-spouse.     Chief Complaint   Patient presents with    Diabetes     Recheck on diabetes.    Hypertension     Recheck on blood pressure.    Benign Prostatic Hypertrophy     Recheck on medication.     Imm/Inj     He will do the RSV and second Shingles through his Pharmacy.    Outside results     He had testing done through his [facility] in December.  [facility].  They brought those in today to review.       History of Present Illness       Diabetes:   He presents for follow up of diabetes.  He is checking home blood glucose three times daily.   He checks blood glucose before meals.  Blood glucose is sometimes over 200 and never under 70. He is aware of hypoglycemia symptoms including dizziness and weakness.    He has no concerns regarding his diabetes at this time.   He is not experiencing numbness or burning in feet, excessive thirst, blurry vision, weight changes or redness, sores or blisters on feet. The patient has not had a diabetic eye exam in the last 12 months.          Hypertension: He presents for follow up of hypertension.  He does not check blood pressure  regularly outside of the clinic. Outpatient blood pressures have not been over 140/90. He does not follow a low salt diet.     He eats 2-3 servings of fruits and vegetables daily.He consumes 0 sweetened beverage(s) daily.He exercises with enough effort to increase his heart rate 10 to 19 minutes per day.  He exercises with enough effort to increase his heart rate 3 or less days per week.   He is taking medications regularly.    "      Medication Followup of BPH  Taking Medication as prescribed: yes, he is also taking an OTC prostate medication along with his prescribed medication.   Side Effects:  None  Medication Helping Symptoms:  He is still getting up about 3 times per night to urinate.            Review of Systems  CONSTITUTIONAL: NEGATIVE for fever, chills, change in weight  INTEGUMENTARY/SKIN: NEGATIVE for worrisome rashes, moles or lesions  ENT/MOUTH: NEGATIVE for ear, mouth and throat problems  RESP: NEGATIVE for significant cough or SOB  CV: NEGATIVE for chest pain, palpitations or peripheral edema  GI: NEGATIVE for nausea, abdominal pain, heartburn, or change in bowel habits  MUSCULOSKELETAL: NEGATIVE for significant arthralgias or myalgia  ENDOCRINE: NEGATIVE for temperature intolerance, skin/hair changes  PSYCHIATRIC: NEGATIVE for changes in mood or affect      Objective    BP (!) 142/90 (BP Location: Left arm, Patient Position: Chair)   Pulse 83   Temp 97.8  F (36.6  C) (Tympanic)   Resp 18   Ht 1.778 m (5' 10\")   Wt 122.5 kg (270 lb)   SpO2 96%   BMI 38.74 kg/m    Body mass index is 38.74 kg/m .  Physical Exam   GENERAL: alert and no distress  EYES: Eyes grossly normal to inspection, PERRL and conjunctivae and sclerae normal  HENT: ear canals and TM's normal, nose and mouth without ulcers or lesions  NECK: no adenopathy, no asymmetry, masses, or scars  RESP: lungs clear to auscultation - no rales, rhonchi or wheezes  CV: regular rate and rhythm, normal S1 S2, no S3 or S4, no murmur, click or rub, no peripheral edema  ABDOMEN: soft, nontender, no hepatosplenomegaly, no masses and bowel sounds normal  MS: no gross musculoskeletal defects noted, no edema  SKIN: no suspicious lesions or rashes  NEURO: Normal strength and tone, mentation intact and speech normal    Lab on 01/15/2024   Component Date Value Ref Range Status    Hemoglobin A1C 01/15/2024 8.1 (H)  0.0 - 5.6 % Final    Normal <5.7%   Prediabetes 5.7-6.4%  "   Diabetes 6.5% or higher     Note: Adopted from ADA consensus guidelines.    Cholesterol 01/15/2024 121  <200 mg/dL Final    Triglycerides 01/15/2024 150 (H)  <150 mg/dL Final    Direct Measure HDL 01/15/2024 37 (L)  >=40 mg/dL Final    LDL Cholesterol Calculated 01/15/2024 54  <=100 mg/dL Final    Non HDL Cholesterol 01/15/2024 84  <130 mg/dL Final    Patient Fasting > 8hrs? 01/15/2024 Yes   Final           Signed Electronically by: Rama Michel MD

## 2024-02-01 ENCOUNTER — TELEPHONE (OUTPATIENT)
Dept: FAMILY MEDICINE | Facility: CLINIC | Age: 79
End: 2024-02-01

## 2024-02-01 ENCOUNTER — ALLIED HEALTH/NURSE VISIT (OUTPATIENT)
Dept: FAMILY MEDICINE | Facility: CLINIC | Age: 79
End: 2024-02-01
Payer: COMMERCIAL

## 2024-02-01 VITALS — DIASTOLIC BLOOD PRESSURE: 76 MMHG | SYSTOLIC BLOOD PRESSURE: 124 MMHG | HEART RATE: 88 BPM

## 2024-02-01 DIAGNOSIS — I10 HYPERTENSION, UNSPECIFIED TYPE: ICD-10-CM

## 2024-02-01 DIAGNOSIS — I10 ESSENTIAL HYPERTENSION: Primary | ICD-10-CM

## 2024-02-01 PROCEDURE — 99207 PR NO CHARGE NURSE ONLY: CPT

## 2024-02-01 NOTE — TELEPHONE ENCOUNTER
Called and spoke with pt, pt was informed no changes to his prescription. Pt verbalized understanding.    JAZMIN Perez.

## 2024-02-01 NOTE — TELEPHONE ENCOUNTER
Orville Plasencia is a 78 year old patient who comes in today for a Blood Pressure check because of medication change and ongoing blood pressure monitoring.  Amlodipine increased last visit.   Vital Signs as repeated by RN today:  /76; Pulse 88.   Patient is taking medication as prescribed.  Patient is tolerating medications well.  Patient is monitoring Blood Pressure at home on occasion.  His wife checks for him, checked this morning, but he's not sure what it was.  He reports she was pleased with the result and said it was good.   Current complaints: none  Disposition:  patient to continue with the same medication.    May Moss RN  [facility]

## 2024-02-01 NOTE — PROGRESS NOTES
Orville Plasencia is a 78 year old patient who comes in today for a Blood Pressure check because of medication change and ongoing blood pressure monitoring.  Amlodipine increased last visit.   Vital Signs as repeated by RN today:  /76; Pulse 88.   Patient is taking medication as prescribed.  Patient is tolerating medications well.  Patient is monitoring Blood Pressure at home on occasion.  His wife checks for him, checked this morning, but he's not sure what it was.  He reports she was pleased with the result and said it was good.   Current complaints: none  Disposition:  patient to continue with the same medication.    May Moss RN  [facility]

## 2024-02-02 RX ORDER — AMLODIPINE BESYLATE 5 MG/1
5 TABLET ORAL DAILY
Qty: 90 TABLET | Refills: 0 | OUTPATIENT
Start: 2024-02-02

## 2024-02-02 NOTE — TELEPHONE ENCOUNTER
Please disregard this request, prescription is at pharmacy. Geneva Morley on 2/2/2024 at 9:38 AM

## 2024-02-07 NOTE — ANESTHESIA CARE TRANSFER NOTE
Patient: Orville Plasencia    Procedure(s):  colonoscopy    Diagnosis: screening  Diagnosis Additional Information: No value filed.    Anesthesia Type:   MAC, General     Note:    Patient transferred to:Phase II  Handoff Report: Identifed the Patient, Identified the Reponsible Provider, Reviewed the pertinent medical history, Discussed the surgical course, Reviewed Intra-OP anesthesia mangement and issues during anesthesia, Set expectations for post-procedure period and Allowed opportunity for questions and acknowledgement of understanding      Vitals: (Last set prior to Anesthesia Care Transfer)    CRNA VITALS  11/8/2018 1224 - 11/8/2018 1255      11/8/2018             Pulse: 54    SpO2: 98 %                Electronically Signed By: Shant Kincaid CRNA, JAHAIRA MONTANO  November 8, 2018  12:55 PM   Medical Necessity Clause: This procedure was medically necessary because the lesions that were treated were: Show Spray Paint Technique Variable?: Yes Duration Of Freeze Thaw-Cycle (Seconds): 5-10 Detail Level: Detailed Post-Care Instructions: I reviewed with the patient in detail post-care instructions. Patient is to wear sunprotection, and avoid picking at any of the treated lesions. Pt may apply Vaseline to crusted or scabbing areas. Add 52 Modifier (Optional): no Spray Paint Text: The liquid nitrogen was applied to the skin utilizing a spray paint frosting technique. Consent: The patient's consent was obtained including but not limited to risks of crusting, scabbing, blistering, scarring, darker or lighter pigmentary change, recurrence, incomplete removal and infection. Application Tool (Optional): Liquid Nitrogen Sprayer Medical Necessity Information: It is in your best interest to select a reason for this procedure from the list below. All of these items fulfill various CMS LCD requirements except the new and changing color options. Number Of Freeze-Thaw Cycles: 1 freeze-thaw cycle

## 2024-02-16 DIAGNOSIS — E11.65 TYPE 2 DIABETES MELLITUS WITH HYPERGLYCEMIA, WITH LONG-TERM CURRENT USE OF INSULIN (H): ICD-10-CM

## 2024-02-16 DIAGNOSIS — Z79.4 TYPE 2 DIABETES MELLITUS WITH HYPERGLYCEMIA, WITH LONG-TERM CURRENT USE OF INSULIN (H): ICD-10-CM

## 2024-02-19 RX ORDER — PEN NEEDLE, DIABETIC 32GX 5/32"
NEEDLE, DISPOSABLE MISCELLANEOUS
Qty: 150 EACH | Refills: 11 | Status: SHIPPED | OUTPATIENT
Start: 2024-02-19

## 2024-03-20 DIAGNOSIS — E11.65 TYPE 2 DIABETES MELLITUS WITH HYPERGLYCEMIA, WITH LONG-TERM CURRENT USE OF INSULIN (H): ICD-10-CM

## 2024-03-20 DIAGNOSIS — Z79.4 TYPE 2 DIABETES MELLITUS WITH HYPERGLYCEMIA, WITH LONG-TERM CURRENT USE OF INSULIN (H): ICD-10-CM

## 2024-03-20 RX ORDER — INSULIN GLARGINE 100 [IU]/ML
INJECTION, SOLUTION SUBCUTANEOUS
Qty: 45 ML | Refills: 0 | Status: SHIPPED | OUTPATIENT
Start: 2024-03-20 | End: 2024-06-18

## 2024-03-20 NOTE — TELEPHONE ENCOUNTER
Left this detailed message on patient's voicemail.  The voicemail prompt identified patient as the  of this message.  Advised prescription was sent and that I will forward to PCP for further refill consideration.  Failed RN refill protocol as noted below.    Gerda Beasley RN

## 2024-03-20 NOTE — TELEPHONE ENCOUNTER
Routing refill request to provider for review/approval because:  Labs out of range:    Pt is due for GFR,  last creatinine is out of protocol range, and recent A1C is above refill protocol limit.    Gerda Beasley RN

## 2024-03-20 NOTE — TELEPHONE ENCOUNTER
Routed to Carlie Martin, our provider of day, since this is not addressed yet and pt is OUT of insulin.    Gerda Beasley RN

## 2024-03-20 NOTE — TELEPHONE ENCOUNTER
Medication Question or Refill        What medication are you calling about (include dose and sig)?: Pending Prescriptions:                       Disp   Refills    insulin glargine (LANTUS SOLOSTAR) 100 UN*45 mL  4            Sig: INJECT 27 UNITS SUBCUTANEOUSLY IN THE MORNING AND           27 UNITS IN THE EVENING. If fasting blood sugar           is less than 80 reduce evening dose by 2 units        Preferred Pharmacy:   [facility] [address] [place] [address] [address]  [address] [place] [address]  Phone: [phone] Fax: [phone]      Controlled Substance Agreement on file:   CSA -- Patient Level:    CSA: None found at the patient level.       Who prescribed the medication?: [place]    Do you need a refill? Yes    Patient offered an appointment? No    Do you have any questions or concerns?  Yes: pt currently out of medication and requesting an urgent fill      Could we send this information to you in [facility] or would you prefer to receive a phone call?:  Yes [phone]

## 2024-03-20 NOTE — TELEPHONE ENCOUNTER
Requested Prescriptions   Pending Prescriptions Disp Refills    insulin glargine (LANTUS SOLOSTAR) 100 UNIT/ML pen 45 mL 4     Sig: INJECT 27 UNITS SUBCUTANEOUSLY IN THE MORNING AND 27 UNITS IN THE EVENING. If fasting blood sugar is less than 80 reduce evening dose by 2 units       Insulin Protocol Failed - 3/20/2024  3:29 PM        Failed - Has GFR on file in past 12 months and most recent value is normal        Failed - Chart Review Required     Review Chart.    Do not approve if insulin is used in a pump.  Instead, direct refill request to the patient's endocrinologist.  If the patient doesn't have an endocrinologist, then send the refill to the patient's PCP for review            Passed - Medication is active on med list        Passed - Recent (6 mo) or future (90 days) visit within the authorizing provider's specialty     The patient must have completed an in-person or virtual visit within the past 6 months or has a future visit scheduled within the next 90 days with the authorizing provider s specialty.  Urgent care and e-visits do not quality as an office visit for this protocol.          Passed - Medication indicated for associated diagnosis     Medication is associated with one or more of the following diagnoses:   - Type 1 diabetes mellitus  - Type 2 diabetes mellitus  - Diabetic nephropathy; Prophylaxis  - Neuropathy due to diabetes mellitus; Prophylaxis  - Retinopathy due to diabetes mellitus; Prophylaxis  - Diabetes mellitus associated with cystic fibrosis  - Disorder of cardiovascular system; Prophylaxis - Type 1 diabetes mellitus   - Disorder of cardiovascular system; Prophylaxis - Type 2 diabetes mellitus            Passed - Patient is 18 years of age or older       Long Acting Insulin Protocol Passed - 3/20/2024  3:29 PM        Passed - Serum creatinine on file in past 12 months     Recent Labs   Lab Test 09/08/23  0647   CR 1.22*                 Passed - HgbA1C in past 3 or 6 months     If HgbA1C  "is 8 or greater, it needs to be on file within the past 3 months.  If less than 8, must be on file within the past 6 months.     Recent Labs   Lab Test 01/15/24  0732   A1C 8.1*             Passed - Medication is active on med list        Passed - Patient is age 18 or older        Passed - Recent (6 mo) or future (30 days) visit within the authorizing provider's specialty     Patient had office visit in the last 6 months or has a visit in the next 30 days with authorizing provider or within the authorizing provider's specialty.  See \"Patient Info\" tab in inbasket, or \"Choose Columns\" in Meds & Orders section of the refill encounter.                 "

## 2024-04-05 ENCOUNTER — TELEPHONE (OUTPATIENT)
Dept: FAMILY MEDICINE | Facility: CLINIC | Age: 79
End: 2024-04-05
Payer: COMMERCIAL

## 2024-04-05 NOTE — TELEPHONE ENCOUNTER
Patient's wife Socorro calling asking what to do if [facility] does not have any Victoza in stock when he is due for his next refill next week. Advised to contact other pharmacies to inquire about their supply and let care team know where to send new RX. She verbalized understanding.   Lulu STEVE RN

## 2024-06-11 ENCOUNTER — NURSE TRIAGE (OUTPATIENT)
Dept: FAMILY MEDICINE | Facility: CLINIC | Age: 79
End: 2024-06-11
Payer: COMMERCIAL

## 2024-06-11 DIAGNOSIS — R80.9 TYPE 2 DIABETES MELLITUS WITH MICROALBUMINURIA, WITH LONG-TERM CURRENT USE OF INSULIN (H): ICD-10-CM

## 2024-06-11 DIAGNOSIS — E11.29 TYPE 2 DIABETES MELLITUS WITH MICROALBUMINURIA, WITH LONG-TERM CURRENT USE OF INSULIN (H): ICD-10-CM

## 2024-06-11 DIAGNOSIS — Z79.4 TYPE 2 DIABETES MELLITUS WITH MICROALBUMINURIA, WITH LONG-TERM CURRENT USE OF INSULIN (H): ICD-10-CM

## 2024-06-11 NOTE — TELEPHONE ENCOUNTER
"Nurse Triage SBAR    Is this a 2nd Level Triage? NO    Situation: Patient's wife called stating that patient has been bothered more by heartburn, shortness of breath lately. This has been slowly increasing over the last 3 months.    Background: Patient is very active doing yard work, etc.  History includes RADHA, pancreatitis, GERD, obesity, DM2, hyperlipidemia, CKD stage 3b. He did have a check out by cardiology \"quite a while ago\" and was found to have no cardiac issues.  Is on blood pressure medications and it is monitored by wife.    Assessment: Patient is not in acute distress.  Per wife, within the last few months he has needed to take more rest breaks when doing activities and seems to complain of some heartburn and is noticeably short of breath a little more. She states that he sleeps more and in the evenings sometimes will sneeze many times in a row.      Protocol Recommended Disposition:   See in Office Within 2 Weeks    Recommendation: Scheduled with provider for next week.  Did advise wife that if she notices a dramatic increase in difficulty breathing or any other symptoms to call 911.  She verbalized understanding.     MEGHANN Keith RN  [facility]      Does the patient meet one of the following criteria for ADS visit consideration? 16+ years old, with an [facility] PCP     TIP  Providers, please consider if this condition is appropriate for management at one of our Acute and Diagnostic Services sites.     If patient is a good candidate, please use dotphrase <dot>triageresponse and select Refer to ADS to document.    Reason for Disposition   MILD longstanding difficulty breathing (e.g., speaks in phrases, SOB even at rest, pulse 100-120) and SAME as normal    Additional Information   Negative: SEVERE difficulty breathing (e.g., struggling for each breath, speaks in single words, pulse > 120)   Negative: Breathing stopped and hasn't returned   Negative: Choking on something   " "Negative: Bluish (or gray) lips or face   Negative: Difficult to awaken or acting confused (e.g., disoriented, slurred speech)   Negative: Passed out (i.e., fainted, collapsed and was not responding)   Negative: Wheezing started suddenly after medicine, an allergic food, or bee sting   Negative: Stridor (harsh sound while breathing in)   Negative: Slow, shallow and weak breathing   Negative: Sounds like a life-threatening emergency to the triager   Negative: Chest pain   Negative: Wheezing (high pitched whistling sound) and previous asthma attacks or use of asthma medicines   Negative: Difficulty breathing and within 14 days of COVID-19 Exposure   Negative: Difficulty breathing and only present when coughing   Negative: Difficulty breathing and only from stuffy nose   Negative: Difficulty breathing and only from stuffy nose or runny nose from common cold   Negative: MODERATE difficulty breathing (e.g., speaks in phrases, SOB even at rest, pulse 100-120) of new-onset or worse than normal   Negative: Oxygen level (e.g., pulse oximetry) 90% or lower   Negative: Wheezing can be heard across the room   Negative: Drooling or spitting out saliva (because can't swallow)   Negative: Any history of prior \"blood clot\" in leg or lungs   Negative: Illness requiring prolonged bedrest in past month (e.g., immobilization, long hospital stay)   Negative: Hip or leg fracture (broken bone) in past month (or had cast on leg or ankle in past month)   Negative: Major surgery in the past month   Negative: Long-distance travel in past month (e.g., car, bus, train, plane; with trip lasting 6 or more hours)   Negative: Cancer treatment in past six months (or has cancer now)   Negative: Extra heartbeats, irregular heart beating, or heart is beating very fast (i.e., 'palpitations')   Negative: Fever > 103 F (39.4 C)   Negative: Fever > 101 F (38.3 C) and over 60 years of age   Negative: Fever > 100.0 F (37.8 C) and bedridden (e.g., nursing " "home patient, stroke, chronic illness, recovering from surgery)   Negative: Fever > 100.0 F (37.8 C) and diabetes mellitus or weak immune system (e.g., HIV positive, cancer chemo, splenectomy, organ transplant, chronic steroids)   Negative: Periods where breathing stops and then resumes normally and bedridden (e.g., nursing home patient, CVA)   Negative: Pregnant or postpartum (from 0 to 6 weeks after delivery)   Negative: Patient sounds very sick or weak to the triager   Negative: MILD difficulty breathing (e.g., minimal/no SOB at rest, SOB with walking, pulse < 100) of new-onset or worse than normal   Negative: Longstanding difficulty breathing (e.g., CHF, COPD, emphysema) and worse than normal   Negative: Longstanding difficulty breathing and not responding to usual therapy   Negative: Continuous (nonstop) coughing   Negative: Patient wants to be seen   Negative: Oxygen level (e.g., pulse oximetry) 91 to 94%   Negative: MODERATE longstanding difficulty breathing (e.g., speaks in phrases, SOB even at rest, pulse 100-120) and SAME as normal    Answer Assessment - Initial Assessment Questions  1. RESPIRATORY STATUS: \"Describe your breathing?\" (e.g., wheezing, shortness of breath, unable to speak, severe coughing)       Has shortness of breath more often when doing activities and does have heartburn  2. ONSET: \"When did this breathing problem begin?\"       Since April  3. PATTERN \"Does the difficult breathing come and go, or has it been constant since it started?\"       Comes and goes  4. SEVERITY: \"How bad is your breathing?\" (e.g., mild, moderate, severe)     - MILD: No SOB at rest, mild SOB with walking, speaks normally in sentences, can lie down, no retractions, pulse < 100.     - MODERATE: SOB at rest, SOB with minimal exertion and prefers to sit, cannot lie down flat, speaks in phrases, mild retractions, audible wheezing, pulse 100-120.     - SEVERE: Very SOB at rest, speaks in single words, struggling to " "breathe, sitting hunched forward, retractions, pulse > 120       When outside and sometimes at rest seems short of breath and does complain on heartburn.  He is very active and then has to rest.  5. RECURRENT SYMPTOM: \"Have you had difficulty breathing before?\" If Yes, ask: \"When was the last time?\" and \"What happened that time?\"       Been slowly  6. CARDIAC HISTORY: \"Do you have any history of heart disease?\" (e.g., heart attack, angina, bypass surgery, angioplasty)       hytertension  7. LUNG HISTORY: \"Do you have any history of lung disease?\"  (e.g., pulmonary embolus, asthma, emphysema)      no  8. CAUSE: \"What do you think is causing the breathing problem?\"       unsure  9. OTHER SYMPTOMS: \"Do you have any other symptoms? (e.g., dizziness, runny nose, cough, chest pain, fever)      Heart burn and sneezing and fatigue - sleeping more.  10. O2 SATURATION MONITOR:  \"Do you use an oxygen saturation monitor (pulse oximeter) at home?\" If Yes, ask: \"What is your reading (oxygen level) today?\" \"What is your usual oxygen saturation reading?\" (e.g., 95%)        no  11. PREGNANCY: \"Is there any chance you are pregnant?\" \"When was your last menstrual period?\"        no  12. TRAVEL: \"Have you traveled out of the country in the last month?\" (e.g., travel history, exposures)        no    Protocols used: Breathing Difficulty-A-[place]    "

## 2024-06-12 NOTE — TELEPHONE ENCOUNTER
Has appointment scheduled for 6/18/24.      Requested Prescriptions   Pending Prescriptions Disp Refills    VICTOZA PEN 18 MG/3ML soln [Pharmacy Med Name: Victoza 18 MG/3ML Subcutaneous Solution Pen-injector] 9 mL 0     Sig: INJECT 1.8MG SUBCUTANEOUSLY ONCE DAILY       GLP-1 Agonists Protocol Failed - 6/11/2024  9:36 PM        Failed - HgbA1C in past 3 or 6 months     If HgbA1C is 8 or greater, it needs to be on file within the past 3 months.  If less than 8, must be on file within the past 6 months.     Recent Labs   Lab Test 01/15/24  0732 12/09/23  1200   A1C 8.1*  --    05054  --  7.4*             Failed - Has GFR on file in past 12 months and most recent value is normal        Passed - Medication is active on med list        Passed - Recent (6 mo) or future (90 days) visit within the authorizing provider's specialty     The patient must have completed an in-person or virtual visit within the past 6 months or has a future visit scheduled within the next 90 days with the authorizing provider s specialty.  Urgent care and e-visits do not quality as an office visit for this protocol.          Passed - Medication indicated for associated diagnosis     Medication is associated with one or more of the following diagnoses:     Type 2 diabetes mellitus           Passed - Patient is age 18 or older

## 2024-06-13 RX ORDER — LIRAGLUTIDE 6 MG/ML
INJECTION SUBCUTANEOUS
Qty: 9 ML | Refills: 0 | Status: SHIPPED | OUTPATIENT
Start: 2024-06-13 | End: 2024-07-16

## 2024-06-18 ENCOUNTER — ANCILLARY PROCEDURE (OUTPATIENT)
Dept: GENERAL RADIOLOGY | Facility: CLINIC | Age: 79
End: 2024-06-18
Attending: FAMILY MEDICINE
Payer: COMMERCIAL

## 2024-06-18 ENCOUNTER — OFFICE VISIT (OUTPATIENT)
Dept: FAMILY MEDICINE | Facility: CLINIC | Age: 79
End: 2024-06-18
Payer: COMMERCIAL

## 2024-06-18 ENCOUNTER — TELEPHONE (OUTPATIENT)
Dept: FAMILY MEDICINE | Facility: CLINIC | Age: 79
End: 2024-06-18

## 2024-06-18 VITALS
HEART RATE: 86 BPM | WEIGHT: 270 LBS | SYSTOLIC BLOOD PRESSURE: 118 MMHG | DIASTOLIC BLOOD PRESSURE: 70 MMHG | TEMPERATURE: 98.4 F | HEIGHT: 70 IN | OXYGEN SATURATION: 96 % | RESPIRATION RATE: 20 BRPM | BODY MASS INDEX: 38.65 KG/M2

## 2024-06-18 DIAGNOSIS — E11.65 TYPE 2 DIABETES MELLITUS WITH HYPERGLYCEMIA, WITH LONG-TERM CURRENT USE OF INSULIN (H): ICD-10-CM

## 2024-06-18 DIAGNOSIS — I72.9 PSEUDOANEURYSM (H): ICD-10-CM

## 2024-06-18 DIAGNOSIS — Z23 HIGH PRIORITY FOR 2019-NCOV VACCINE: ICD-10-CM

## 2024-06-18 DIAGNOSIS — N18.30 STAGE 3 CHRONIC KIDNEY DISEASE, UNSPECIFIED WHETHER STAGE 3A OR 3B CKD (H): ICD-10-CM

## 2024-06-18 DIAGNOSIS — Z79.4 TYPE 2 DIABETES MELLITUS WITH MICROALBUMINURIA, WITH LONG-TERM CURRENT USE OF INSULIN (H): Primary | ICD-10-CM

## 2024-06-18 DIAGNOSIS — Z79.4 TYPE 2 DIABETES MELLITUS WITH HYPERGLYCEMIA, WITH LONG-TERM CURRENT USE OF INSULIN (H): ICD-10-CM

## 2024-06-18 DIAGNOSIS — R80.9 TYPE 2 DIABETES MELLITUS WITH MICROALBUMINURIA, WITH LONG-TERM CURRENT USE OF INSULIN (H): Primary | ICD-10-CM

## 2024-06-18 DIAGNOSIS — R06.02 SHORTNESS OF BREATH: ICD-10-CM

## 2024-06-18 DIAGNOSIS — E66.01 MORBID OBESITY DUE TO EXCESS CALORIES (H): ICD-10-CM

## 2024-06-18 DIAGNOSIS — E21.3 HYPERPARATHYROIDISM (H): ICD-10-CM

## 2024-06-18 DIAGNOSIS — E11.29 TYPE 2 DIABETES MELLITUS WITH MICROALBUMINURIA, WITH LONG-TERM CURRENT USE OF INSULIN (H): Primary | ICD-10-CM

## 2024-06-18 LAB
ANION GAP SERPL CALCULATED.3IONS-SCNC: 11 MMOL/L (ref 7–15)
BUN SERPL-MCNC: 28.3 MG/DL (ref 8–23)
CALCIUM SERPL-MCNC: 9.5 MG/DL (ref 8.8–10.2)
CHLORIDE SERPL-SCNC: 104 MMOL/L (ref 98–107)
CREAT SERPL-MCNC: 1.34 MG/DL (ref 0.67–1.17)
CREAT UR-MCNC: 222.7 MG/DL
DEPRECATED HCO3 PLAS-SCNC: 23 MMOL/L (ref 22–29)
EGFRCR SERPLBLD CKD-EPI 2021: 54 ML/MIN/1.73M2
GLUCOSE SERPL-MCNC: 140 MG/DL (ref 70–99)
HBA1C MFR BLD: 8.7 % (ref 0–5.6)
HGB BLD-MCNC: 14.1 G/DL (ref 13.3–17.7)
MICROALBUMIN UR-MCNC: 28.3 MG/L
MICROALBUMIN/CREAT UR: 12.71 MG/G CR (ref 0–17)
NT-PROBNP SERPL-MCNC: 92 PG/ML (ref 0–1800)
POTASSIUM SERPL-SCNC: 4.9 MMOL/L (ref 3.4–5.3)
SODIUM SERPL-SCNC: 138 MMOL/L (ref 135–145)

## 2024-06-18 PROCEDURE — 71046 X-RAY EXAM CHEST 2 VIEWS: CPT | Mod: TC | Performed by: RADIOLOGY

## 2024-06-18 PROCEDURE — 36415 COLL VENOUS BLD VENIPUNCTURE: CPT | Performed by: FAMILY MEDICINE

## 2024-06-18 PROCEDURE — 83880 ASSAY OF NATRIURETIC PEPTIDE: CPT | Performed by: FAMILY MEDICINE

## 2024-06-18 PROCEDURE — 91320 SARSCV2 VAC 30MCG TRS-SUC IM: CPT | Performed by: FAMILY MEDICINE

## 2024-06-18 PROCEDURE — 85018 HEMOGLOBIN: CPT | Performed by: FAMILY MEDICINE

## 2024-06-18 PROCEDURE — 90480 ADMN SARSCOV2 VAC 1/ONLY CMP: CPT | Performed by: FAMILY MEDICINE

## 2024-06-18 PROCEDURE — 80048 BASIC METABOLIC PNL TOTAL CA: CPT | Performed by: FAMILY MEDICINE

## 2024-06-18 PROCEDURE — 83036 HEMOGLOBIN GLYCOSYLATED A1C: CPT | Performed by: FAMILY MEDICINE

## 2024-06-18 PROCEDURE — 82570 ASSAY OF URINE CREATININE: CPT | Performed by: FAMILY MEDICINE

## 2024-06-18 PROCEDURE — 99214 OFFICE O/P EST MOD 30 MIN: CPT | Performed by: FAMILY MEDICINE

## 2024-06-18 PROCEDURE — 82043 UR ALBUMIN QUANTITATIVE: CPT | Performed by: FAMILY MEDICINE

## 2024-06-18 RX ORDER — INSULIN GLARGINE 100 [IU]/ML
30 INJECTION, SOLUTION SUBCUTANEOUS 2 TIMES DAILY
Qty: 54 ML | Refills: 3 | Status: SHIPPED | OUTPATIENT
Start: 2024-06-18 | End: 2024-06-19

## 2024-06-18 ASSESSMENT — ENCOUNTER SYMPTOMS: SHORTNESS OF BREATH: 1

## 2024-06-18 ASSESSMENT — PAIN SCALES - GENERAL: PAINLEVEL: NO PAIN (0)

## 2024-06-18 NOTE — TELEPHONE ENCOUNTER
Medication Question or Refill        What medication are you calling about (include dose and sig)?:   nsulin glargine (LANTUS SOLOSTAR) 100 UNIT/ML pen  30 Units, 2 TIMES DAILY           Summary: Inject 30 Units Subcutaneous 2 times daily for 90 days INJECT 27 UNITS SUBCUTANEOUSLY IN THE MORNING AND 27 UNITS IN THE EVENING. If fasting blood sugar is less than 80 reduce evening dose by 2 units, Disp-54          Fax from [employer] asking for clarification on directions for Lantus Solostar Pen.  Which is correct, 27 units or 30 units?  Please get back to pharmacy.    Preferred Pharmacy:   [facility] [place] - [id number]  [address] [place] [address]  Phone: [phone] Fax: [phone]      Controlled Substance Agreement on file:   CSA -- Patient Level:    CSA: None found at the patient level.       Who prescribed the medication?: Anand Cross on 6/18/2024 at 2:48 PM

## 2024-06-18 NOTE — TELEPHONE ENCOUNTER
Fax from [employer] asking for clarification on directions for Lantus Solostar Pen. Which is correct, 27 units or 30 units? Please get back to pharmacy.     Thank you    Juliana GLASER RN

## 2024-06-18 NOTE — PROGRESS NOTES
Assessment & Plan   Problem List Items Addressed This Visit       Hyperparathyroidism (H24)- Stable, no new symptoms    Morbid obesity due to excess calories (H)    Relevant Medications    insulin glargine (LANTUS SOLOSTAR) 100 UNIT/ML pen    Pseudoaneurysm (H24)    Type 2 diabetes mellitus with hyperglycemia (H)-   Diabetes was worse, adjusted insulin dose    Relevant Medications    insulin glargine (LANTUS SOLOSTAR) 100 UNIT/ML pen    Type 2 diabetes mellitus with microalbuminuria, with long-term current use of insulin (H) - Primary    Relevant Medications    insulin glargine (LANTUS SOLOSTAR) 100 UNIT/ML pen     Other Visit Diagnoses       Shortness of breath- Unknown etiology. Chest x-ray and labs done.     Relevant Orders    XR Chest 2 Views    Basic metabolic panel  (Ca, Cl, CO2, Creat, Gluc, K, Na, BUN)    BNP-N terminal pro    NM Lexiscan stress test    Echocardiogram Complete    High priority for 2019-nCoV vaccine        Stage 3 chronic kidney disease, unspecified whether stage 3a or 3b CKD (H)  - stable.      Relevant Orders    Hemoglobin (Completed)         79 yr old male here for shortness of breath, chest pain on occasions and increased heart burn.     Recommend that he schedule the stress test and ECHO. Labs ordered. Patient will be notified of results.          FUTURE APPOINTMENTS:       - Follow-up visit as needed.      Jackie Jacinto is a 79 year old, presenting for the following health issues:    Mr. Jacinto is 71-year-old male here with his wife for recheck of his diabetes.  His past medical history is significant for Type II diabetes, GERD,Chronic kidney disease,obesity.    He reports the last few months he has noticed that shortness of breath that has steadily gotten worse.  He has a hard time walking from his house to the [employer] can and most times he has to stop to get a rest.  He has also been noticing increasing heartburn however it appears the heartburn may be chest pain related.  He did  mention that when he is having the heartburn he does have shortness of breath.  He does not have any history of coronary artery disease. A couple of years ago he was admitted for pancreatitis in the hospital and had an episode of atrial fibrillation.  He was subsequently cleared from that standpoint as there was no evidence that the atrial fibrillation was sustained.  He reports no chest pain or pressure at  except when he has the heartburn.      His diabetes is a bit worse A1c kelly to 8.7.  Patient is on Victoza and insulin.  I adjusted the dose of his Lantus.        He has also increased the use of his famotidine .  He he says he gets some relief using the famotidine.  Patient mentioned allergy symptoms which have been ongoing he says sometimes in after eating he goes into a sneezing frenzy and occasionally have a dry cough.  Overall he appears to be doing well without any other systemic symptoms.  Shortness of Breath (He has been noticing shortness that is getting steadily worse.  Starting two years ago.  They have a longer driveway and when taking the trash down he can walk about 30-40 steps and will have to rest.  ), Gastric Problem (Has been having symptoms of reflux.  Taking OTC Famotidine.  He started taking 2 tablets daily and that was not helping.  He has increased to 4 tablets for the past two weeks and that dose seems to be helping.  Not sure if he can take that many tablets daily.  He has been watching the amount and types of foods he is eating.), Sneezing (At times he will sneeze about 10 times after eating.  Can have a dry cough at times.), New medication to add (He is taking Famotidine OTC 4 tablets daily.), Imm/Inj (He will update the Covid injection today. Mentioned about RSV and Shingles vaccine through his Pharmacy for coverage.), and Imm/Inj (COVID-19 VACCINE)        6/18/2024     9:48 AM   Additional Questions   Roomed by Mandy Walter CMA   Accompanied by Socorro-spouse     History of Present  Illness       Reason for visit:  Heartburn, Weakness, Shortness of breath, Sneezing, Dry Cough    He eats 2-3 servings of fruits and vegetables daily.He consumes 0 sweetened beverage(s) daily.He exercises with enough effort to increase his heart rate 9 or less minutes per day.  He exercises with enough effort to increase his heart rate 3 or less days per week.   He is taking medications regularly.       Chief Complaint   Patient presents with    Shortness of Breath     He has been noticing shortness that is getting steadily worse.  Starting two years ago.  They have a longer driveway and when taking the trash down he can walk about 30-40 steps and will have to rest.      Gastric Problem     Has been having symptoms of reflux.  Taking OTC Famotidine.  He started taking 2 tablets daily and that was not helping.  He has increased to 4 tablets for the past two weeks and that dose seems to be helping.  Not sure if he can take that many tablets daily.  He has been watching the amount and types of foods he is eating.    Sneezing     At times he will sneeze about 10 times after eating.  Can have a dry cough at times.    New medication to add     He is taking Famotidine OTC 4 tablets daily.    Imm/Inj     He will update the Covid injection today. Mentioned about RSV and Shingles vaccine through his Pharmacy for coverage.    Imm/Inj     COVID-19 VACCINE       Diabetes Follow-up    How often are you checking your blood sugar? Four or more times daily  Blood sugar testing frequency justification:  Uncontrolled diabetes  What time of day are you checking your blood sugars (select all that apply)?  Before meals  Have you had any blood sugars above 200?  Not very often, on occasion.  Have you had any blood sugars below 70?  Yes once in awhile in the am.  Very few he states.  Depends on what he eats the day prior.  What symptoms do you notice when your blood sugar is low?  None  What concerns do you have today about your diabetes?  "Other: Will need refills soon he states.   Do you have any of these symptoms? (Select all that apply)  Weight gain  Have you had a diabetic eye exam in the last 12 months? No        BP Readings from Last 2 Encounters:   06/18/24 118/70   02/01/24 124/76     Hemoglobin A1C (%)   Date Value   06/18/2024 8.7 (H)   01/15/2024 8.1 (H)   05/14/2021 8.7 (H)   05/06/2021 8.2 (H)     LDL Cholesterol Calculated (mg/dL)   Date Value   01/15/2024 54   01/06/2023 55   05/06/2021 55   01/07/2020 51               Review of Systems  CONSTITUTIONAL: NEGATIVE for fever, chills, change in weight  ENT/MOUTH: NEGATIVE for ear, mouth and throat problems  RESP:POSITIVE for , dyspnea on exertion, and SOB/dyspnea  CV: POSITIVE for chest pain/pressure, dyspnea on exertion, lower extremity edema, and orthopnea  GI: NEGATIVE for nausea, abdominal pain, heartburn, or change in bowel habits  : negative for dysuria, hematuria, decreased urinary stream, erectile dysfunction  MUSCULOSKELETAL: NEGATIVE for significant arthralgias or myalgia  NEURO: NEGATIVE for weakness, dizziness or paresthesias  ENDOCRINE: NEGATIVE for temperature intolerance, skin/hair changes  HEME/ALLERGY/IMMUNE: NEGATIVE for bleeding problems  PSYCHIATRIC: NEGATIVE for changes in mood or affect      Objective    /70 (BP Location: Right leg, Patient Position: Chair, Cuff Size: Adult Large)   Pulse 86   Temp 98.4  F (36.9  C) (Tympanic)   Resp 20   Ht 1.778 m (5' 10\")   Wt 122.5 kg (270 lb)   SpO2 96%   BMI 38.74 kg/m    Body mass index is 38.74 kg/m .  Physical Exam   GENERAL: alert and no distress  EYES: Eyes grossly normal to inspection, PERRL and conjunctivae and sclerae normal  HENT: ear canals and TM's normal, nose and mouth without ulcers or lesions  NECK: no adenopathy, no asymmetry, masses, or scars  RESP: lungs clear to auscultation - no rales, rhonchi or wheezes  CV: regular rate and rhythm, normal S1 S2, no S3 or S4, no murmur, click or rub, no " peripheral edema  ABDOMEN: soft, nontender, no hepatosplenomegaly, no masses and bowel sounds normal  MS: no gross musculoskeletal defects noted, +2 edema  SKIN: no suspicious lesions or rashes  PSYCH: mentation appears normal, affect normal/bright    Results for orders placed or performed in visit on 06/18/24 (from the past 24 hour(s))   **Hemoglobin A1c FUTURE 3mo   Result Value Ref Range    Hemoglobin A1C 8.7 (H) 0.0 - 5.6 %   Hemoglobin   Result Value Ref Range    Hemoglobin 14.1 13.3 - 17.7 g/dL           Signed Electronically by: Rama Michel MD

## 2024-06-19 RX ORDER — INSULIN GLARGINE 100 [IU]/ML
30 INJECTION, SOLUTION SUBCUTANEOUS 2 TIMES DAILY
Qty: 54 ML | Refills: 3 | Status: SHIPPED | OUTPATIENT
Start: 2024-06-19

## 2024-06-24 ENCOUNTER — HOSPITAL ENCOUNTER (OUTPATIENT)
Dept: NUCLEAR MEDICINE | Facility: CLINIC | Age: 79
Setting detail: NUCLEAR MEDICINE
Discharge: HOME | End: 2024-06-24
Attending: FAMILY MEDICINE
Payer: COMMERCIAL

## 2024-06-24 ENCOUNTER — HOSPITAL ENCOUNTER (OUTPATIENT)
Dept: CARDIOLOGY | Facility: CLINIC | Age: 79
Discharge: HOME | End: 2024-06-24
Attending: FAMILY MEDICINE
Payer: COMMERCIAL

## 2024-06-24 VITALS — WEIGHT: 270 LBS | BODY MASS INDEX: 38.65 KG/M2 | HEIGHT: 70 IN

## 2024-06-24 DIAGNOSIS — R06.02 SHORTNESS OF BREATH: ICD-10-CM

## 2024-06-24 LAB
CV STRESS MAX HR HE: 82
RATE PRESSURE PRODUCT: NORMAL
STRESS ECHO BASELINE DIASTOLIC HE: 82
STRESS ECHO BASELINE HR: 77 BPM
STRESS ECHO BASELINE SYSTOLIC BP: 148
STRESS ECHO CALCULATED PERCENT HR: 59 %
STRESS ECHO LAST STRESS DIASTOLIC BP: 77
STRESS ECHO LAST STRESS SYSTOLIC BP: 141
STRESS ECHO TARGET HR: 140

## 2024-06-24 PROCEDURE — 93017 CV STRESS TEST TRACING ONLY: CPT

## 2024-06-24 PROCEDURE — 93018 CV STRESS TEST I&R ONLY: CPT | Performed by: INTERNAL MEDICINE

## 2024-06-24 PROCEDURE — A9502 TC99M TETROFOSMIN: HCPCS | Performed by: FAMILY MEDICINE

## 2024-06-24 PROCEDURE — 250N000011 HC RX IP 250 OP 636: Mod: JZ | Performed by: FAMILY MEDICINE

## 2024-06-24 PROCEDURE — 78452 HT MUSCLE IMAGE SPECT MULT: CPT | Mod: 26 | Performed by: INTERNAL MEDICINE

## 2024-06-24 PROCEDURE — 78452 HT MUSCLE IMAGE SPECT MULT: CPT

## 2024-06-24 PROCEDURE — 343N000001 HC RX 343: Performed by: FAMILY MEDICINE

## 2024-06-24 PROCEDURE — 93016 CV STRESS TEST SUPVJ ONLY: CPT | Performed by: INTERNAL MEDICINE

## 2024-06-24 RX ORDER — REGADENOSON 0.08 MG/ML
0.4 INJECTION, SOLUTION INTRAVENOUS ONCE
Status: COMPLETED | OUTPATIENT
Start: 2024-06-24 | End: 2024-06-24

## 2024-06-24 RX ADMIN — TETROFOSMIN 37.2 MILLICURIE: 1.38 INJECTION, POWDER, LYOPHILIZED, FOR SOLUTION INTRAVENOUS at 13:30

## 2024-06-24 RX ADMIN — TETROFOSMIN 13 MILLICURIE: 1.38 INJECTION, POWDER, LYOPHILIZED, FOR SOLUTION INTRAVENOUS at 12:10

## 2024-06-24 RX ADMIN — REGADENOSON 0.4 MG: 0.4 INJECTION INTRAVENOUS at 13:22

## 2024-07-05 DIAGNOSIS — E11.65 TYPE 2 DIABETES MELLITUS WITH HYPERGLYCEMIA, WITH LONG-TERM CURRENT USE OF INSULIN (H): ICD-10-CM

## 2024-07-05 DIAGNOSIS — Z79.4 TYPE 2 DIABETES MELLITUS WITH HYPERGLYCEMIA, WITH LONG-TERM CURRENT USE OF INSULIN (H): ICD-10-CM

## 2024-07-06 RX ORDER — BLOOD SUGAR DIAGNOSTIC
STRIP MISCELLANEOUS
Qty: 300 STRIP | Refills: 10 | Status: SHIPPED | OUTPATIENT
Start: 2024-07-06

## 2024-07-13 DIAGNOSIS — E11.29 TYPE 2 DIABETES MELLITUS WITH MICROALBUMINURIA, WITH LONG-TERM CURRENT USE OF INSULIN (H): ICD-10-CM

## 2024-07-13 DIAGNOSIS — R80.9 TYPE 2 DIABETES MELLITUS WITH MICROALBUMINURIA, WITH LONG-TERM CURRENT USE OF INSULIN (H): ICD-10-CM

## 2024-07-13 DIAGNOSIS — Z79.4 TYPE 2 DIABETES MELLITUS WITH MICROALBUMINURIA, WITH LONG-TERM CURRENT USE OF INSULIN (H): ICD-10-CM

## 2024-07-16 RX ORDER — LIRAGLUTIDE 6 MG/ML
INJECTION SUBCUTANEOUS
Qty: 9 ML | Refills: 0 | Status: SHIPPED | OUTPATIENT
Start: 2024-07-16 | End: 2024-08-16

## 2024-07-17 ENCOUNTER — HOSPITAL ENCOUNTER (OUTPATIENT)
Dept: CARDIOLOGY | Facility: CLINIC | Age: 79
Discharge: HOME | End: 2024-07-17
Attending: FAMILY MEDICINE | Admitting: FAMILY MEDICINE
Payer: COMMERCIAL

## 2024-07-17 DIAGNOSIS — R06.02 SHORTNESS OF BREATH: ICD-10-CM

## 2024-07-17 LAB — LVEF ECHO: NORMAL

## 2024-07-17 PROCEDURE — 93306 TTE W/DOPPLER COMPLETE: CPT | Mod: 26 | Performed by: INTERNAL MEDICINE

## 2024-07-17 PROCEDURE — 999N000208 ECHOCARDIOGRAM COMPLETE

## 2024-07-17 PROCEDURE — C8929 TTE W OR WO FOL WCON,DOPPLER: HCPCS

## 2024-07-17 PROCEDURE — 255N000002 HC RX 255 OP 636: Performed by: FAMILY MEDICINE

## 2024-07-17 RX ADMIN — HUMAN ALBUMIN MICROSPHERES AND PERFLUTREN 2 ML: 10; .22 INJECTION, SOLUTION INTRAVENOUS at 09:15

## 2024-07-22 NOTE — RESULT ENCOUNTER NOTE
No notes recorded by provider Thank you for choosing [facility]. It was a pleasure to see you for your office visit today.     To reach our Specialty Clinic: [phone]  To reach our Imaging scheduler: [phone]      If you had any blood work, imaging or other tests:  Normal test results will be mailed to your home address in a letter  Abnormal results will be communicated to you via phone call/letter  Please allow up to 1-2 weeks for processing/interpretation of most lab work  If you have questions or concerns call our clinic at [phone]

## 2024-08-15 DIAGNOSIS — R80.9 TYPE 2 DIABETES MELLITUS WITH MICROALBUMINURIA, WITH LONG-TERM CURRENT USE OF INSULIN (H): ICD-10-CM

## 2024-08-15 DIAGNOSIS — E11.29 TYPE 2 DIABETES MELLITUS WITH MICROALBUMINURIA, WITH LONG-TERM CURRENT USE OF INSULIN (H): ICD-10-CM

## 2024-08-15 DIAGNOSIS — Z79.4 TYPE 2 DIABETES MELLITUS WITH MICROALBUMINURIA, WITH LONG-TERM CURRENT USE OF INSULIN (H): ICD-10-CM

## 2024-08-16 RX ORDER — LIRAGLUTIDE 6 MG/ML
INJECTION SUBCUTANEOUS
Qty: 9 ML | Refills: 0 | Status: SHIPPED | OUTPATIENT
Start: 2024-08-16 | End: 2024-09-16

## 2024-08-29 ENCOUNTER — TRANSFERRED RECORDS (OUTPATIENT)
Dept: MULTI SPECIALTY CLINIC | Facility: CLINIC | Age: 79
End: 2024-08-29

## 2024-08-29 LAB — RETINOPATHY: NORMAL

## 2024-09-01 ENCOUNTER — HEALTH MAINTENANCE LETTER (OUTPATIENT)
Age: 79
End: 2024-09-01

## 2024-09-11 NOTE — PROGRESS NOTES
"SUBJECTIVE/OBJECTIVE:                                                    Orville Plasencia is a 71 year old male coming in for an initial visit for Medication Therapy Management.  He was referred from [facility]. Patient was here with his wife today.    Chief Complaint:  Review of current medications and glucose levels. A1C elevated.     Allergies/ADRs: Reviewed in Epic  Tobacco: No tobacco use   Alcohol: not currently using    PMH: Reviewed in Epic    Medication Adherence: no issues reported by patient    Diabetes:  Pt currently taking Lantus 36 units in the morning and 10 units in the evening. Pt also taking Novolog 3 units with meals plus sliding scale (Sliding scale reported by patient spouse is as follows: (0-140 - nothing; 140-160 -1+ unit; 160-180 - 2+ unit;181-200 - 3+ unit; so approximately +1unit for every 20 points up to 350). Pt is not experiencing side effects. Pt is not counting carbs.   SMBG: three times daily.  Ranges (patient reported): mornings: , after meals: 222-535  Symptoms of low blood sugar? Lowest goes is about 80. Frequency of hypoglycemia? rarely.  Recent symptoms of high blood sugar? polydipsia and polyuria  Eye exam: due, making an appointment  Foot exam: up to date  Microalbumin is < 30 mg/g. Pt is not taking an ACEi/ARB.  Aspirin: Not taking due to GI bleed.  Immunization: Did not receive flu vaccine this year, has had both pneumonia vaccines.  Patient's lancing device broke.      Edema: Pt on furosemide 20 mg in the morning. He reports that he does not have edema now and that he goes \"2-3 weeks without using the furosemide without any symptoms\". Pt reports has taken furosemide in the past during his hospital stay for severe pancreatitis. He states that he has had a \"shake in his left hand ever since (he) got out of the hospital.\" Pt's wife reports that pt lost about 123 pounds during hospital stay. Patient is wondering if he can stop taking furosemide.     BPH: recently " started Flomax 0.4 mg daily for increase in urinary frequency.      Current labs include:  BP Readings from Last 3 Encounters:   02/21/17 121/77   09/07/16 108/68   03/04/16 118/73     Today's Vitals: /72  Pulse 68  Lab Results   Component Value Date    A1C 9.0 02/21/2017   .  Lab Results   Component Value Date    CHOL 120 02/21/2017     Lab Results   Component Value Date    TRIG 88 02/21/2017     Lab Results   Component Value Date    HDL 40 02/21/2017     Lab Results   Component Value Date    LDL 62 02/21/2017       Liver Function Studies -   Recent Labs   Lab Test  02/21/17   0816   PROTTOTAL  8.7   ALBUMIN  3.9   BILITOTAL  0.5   ALKPHOS  183*   AST  27   ALT  64       Lab Results   Component Value Date    UCRR 139 02/21/2017    MICROL 82 02/21/2017    UMALCR 58.63 (H) 02/21/2017       Last Basic Metabolic Panel:  Lab Results   Component Value Date     02/21/2017      Lab Results   Component Value Date    POTASSIUM 5.2 02/21/2017     Lab Results   Component Value Date    CHLORIDE 104 02/21/2017     Lab Results   Component Value Date    BUN 30 02/21/2017     Lab Results   Component Value Date    CR 1.18 02/21/2017     GFR Estimate   Date Value Ref Range Status   02/21/2017 61 >60 mL/min/1.7m2 Final     Comment:     Non  GFR Calc   09/07/2016 63 >60 mL/min/1.7m2 Final     Comment:     Non  GFR Calc   02/18/2016 63 >60 mL/min/1.7m2 Final     Comment:     Non  GFR Calc     GFR Estimate If Black   Date Value Ref Range Status   02/21/2017 73 >60 mL/min/1.7m2 Final     Comment:      GFR Calc   09/07/2016 76 >60 mL/min/1.7m2 Final     Comment:      GFR Calc   02/18/2016 76 >60 mL/min/1.7m2 Final     Comment:      GFR Calc     TSH   Date Value Ref Range Status   09/07/2016 1.16 0.40 - 4.00 mU/L Final   ]    Most Recent Immunizations   Administered Date(s) Administered     Influenza (High Dose) 3 valent vaccine  12/03/2014     Pneumococcal (PCV 13) 12/03/2014     Pneumococcal 23 valent 09/20/2013     TDAP (ADACEL AGES 11-64) 10/25/2007     ASSESSMENT:                                                       Current medications were reviewed today.     Medication Adherence: no issues identified    Diabetes: Needs Improvement. Pt not meeting A1C goal of <8%. HIs current A1C is 9% on 2/21/2017. Pt's after meal glucose levels are elevated. I will increase his Novolog (see Plan).      Edema: Stable. I will discuss if patient can lower dose, use as needed or discontinue his furosemide with [facility]. He was taking it for edema since having pancreatitis - patient no longer is having edema even when not taking for a few weeks.     BPH: no change yet - just started Flomax last week. Continue to monitor.       PLAN:                                                      1. [employer] customer service number is: [phone]. I sent a prescription to your pharmacy for a new Multiclix lancing device.  2. Increase your Novolog dose to 4 units with meals plus sliding scale. If your morning blood sugars are going lower than 80, decrease your Lantus dose by 1-2 units. Your goal morning fasting and before meal blood sugar readings is: .  3. I will look at your Epic chart to see if we can lower or stop your furosemide. I did review patient's chart, as stated in Assessment above, patient was on furosemide following pancreatitis for edema. I will discuss with [facility].     I will call Socorro (pt's spouse - consent to communicate signed on 07/23/2013) also I was verbally asked by patient to follow up with Socorro for his blood sugars on all of our follow up visits.     I spent 60 minutes with this patient today.  All changes were made via collaborative practice agreement with [facility]. A copy of the visit note was provided to the patient's primary care provider.    Will follow up in 3 weeks.    The patient was given a summary of  these recommendations as an after visit summary.     Ashley Fournier, Pharm.D. Student  Mariela WaltersD  Medication Therapy Management          37.1

## 2024-09-16 DIAGNOSIS — Z79.4 TYPE 2 DIABETES MELLITUS WITH MICROALBUMINURIA, WITH LONG-TERM CURRENT USE OF INSULIN (H): ICD-10-CM

## 2024-09-16 DIAGNOSIS — R80.9 TYPE 2 DIABETES MELLITUS WITH MICROALBUMINURIA, WITH LONG-TERM CURRENT USE OF INSULIN (H): ICD-10-CM

## 2024-09-16 DIAGNOSIS — E11.29 TYPE 2 DIABETES MELLITUS WITH MICROALBUMINURIA, WITH LONG-TERM CURRENT USE OF INSULIN (H): ICD-10-CM

## 2024-09-16 RX ORDER — LIRAGLUTIDE 6 MG/ML
INJECTION SUBCUTANEOUS
Qty: 9 ML | Refills: 0 | Status: SHIPPED | OUTPATIENT
Start: 2024-09-16

## 2024-10-07 DIAGNOSIS — Z79.4 TYPE 2 DIABETES MELLITUS WITH HYPERGLYCEMIA, WITH LONG-TERM CURRENT USE OF INSULIN (H): ICD-10-CM

## 2024-10-07 DIAGNOSIS — E11.65 TYPE 2 DIABETES MELLITUS WITH HYPERGLYCEMIA, WITH LONG-TERM CURRENT USE OF INSULIN (H): ICD-10-CM

## 2024-10-11 RX ORDER — INSULIN ASPART 100 [IU]/ML
INJECTION, SOLUTION INTRAVENOUS; SUBCUTANEOUS
Qty: 45 ML | Refills: 1 | Status: SHIPPED | OUTPATIENT
Start: 2024-10-11

## 2024-10-19 DIAGNOSIS — Z79.4 TYPE 2 DIABETES MELLITUS WITH MICROALBUMINURIA, WITH LONG-TERM CURRENT USE OF INSULIN (H): ICD-10-CM

## 2024-10-19 DIAGNOSIS — E11.29 TYPE 2 DIABETES MELLITUS WITH MICROALBUMINURIA, WITH LONG-TERM CURRENT USE OF INSULIN (H): ICD-10-CM

## 2024-10-19 DIAGNOSIS — R80.9 TYPE 2 DIABETES MELLITUS WITH MICROALBUMINURIA, WITH LONG-TERM CURRENT USE OF INSULIN (H): ICD-10-CM

## 2024-10-21 NOTE — PATIENT INSTRUCTIONS
PLAN  1. Reduce Lantus dose: 27u AM/ 19u evening, if blood sugar less than 80 in the morning reduce evening Lantus dose further in 2 unit increments.  2. Increase Novolog to 9u with breakfast and continue 8u with lunch/dinner (+ sliding scale with meals if needed)  Sliding scale with meals  140-169  1 units  170-199  2 units  200-229 3 units  230-259  4 units  260-289  5 units  290-319 6 units  320-349 7 units  350+ 8 units     3. Remember your lunchtime insulin, try to eat low cho if you forget your insulin when you are out!  4.  Follow up next month.   Patient has a current diagnosis of hypertensive urgency (without evidence of end organ damage) which is controlled.  Latest blood pressure and vitals reviewed-   Temp:  [97.7 °F (36.5 °C)-98.1 °F (36.7 °C)]   Pulse:  [74-88]   Resp:  [16-20]   BP: (149-178)/(72-93)   SpO2:  [94 %-97 %] .   Patient currently on IV antihypertensives.   Home meds for hypertension were reviewed and noted below.   Hypertension Medications               amLODIPine (NORVASC) 5 MG tablet Take 1 tablet (5 mg total) by mouth once daily.    carvediloL (COREG) 12.5 MG tablet Take 6.25 mg by mouth 2 (two) times daily.    losartan (COZAAR) 100 MG tablet Take 50 mg by mouth once daily.    nitroGLYCERIN (NITROSTAT) 0.4 MG SL tablet Place 1 tablet (0.4 mg total) under the tongue every 5 (five) minutes as needed for Chest pain.            Medication adjustment for hospital antihypertensives is as follows- add hydralazine 25mg TID as per cards recs on 10/21      Will aim for controlled BP reduction by medications noted above. Monitor and mitigate end organ damage as indicated.    Off Cardene drip, continue current p.o. medications

## 2024-10-24 RX ORDER — LIRAGLUTIDE 6 MG/ML
INJECTION SUBCUTANEOUS
Qty: 9 ML | Refills: 0 | Status: SHIPPED | OUTPATIENT
Start: 2024-10-24

## 2024-10-29 ENCOUNTER — TRANSFERRED RECORDS (OUTPATIENT)
Dept: HEALTH INFORMATION MANAGEMENT | Facility: CLINIC | Age: 79
End: 2024-10-29
Payer: COMMERCIAL

## 2024-11-21 DIAGNOSIS — E11.29 TYPE 2 DIABETES MELLITUS WITH MICROALBUMINURIA, WITH LONG-TERM CURRENT USE OF INSULIN (H): ICD-10-CM

## 2024-11-21 DIAGNOSIS — Z79.4 TYPE 2 DIABETES MELLITUS WITH MICROALBUMINURIA, WITH LONG-TERM CURRENT USE OF INSULIN (H): ICD-10-CM

## 2024-11-21 DIAGNOSIS — R80.9 TYPE 2 DIABETES MELLITUS WITH MICROALBUMINURIA, WITH LONG-TERM CURRENT USE OF INSULIN (H): ICD-10-CM

## 2024-11-21 RX ORDER — LIRAGLUTIDE 6 MG/ML
INJECTION SUBCUTANEOUS
Qty: 9 ML | Refills: 0 | Status: SHIPPED | OUTPATIENT
Start: 2024-11-21

## 2024-11-24 ENCOUNTER — HOSPITAL ENCOUNTER (EMERGENCY)
Facility: CLINIC | Age: 79
Discharge: HOME | End: 2024-11-24
Attending: PHYSICIAN ASSISTANT | Admitting: PHYSICIAN ASSISTANT
Payer: COMMERCIAL

## 2024-11-24 ENCOUNTER — APPOINTMENT (OUTPATIENT)
Dept: GENERAL RADIOLOGY | Facility: CLINIC | Age: 79
End: 2024-11-24
Attending: PHYSICIAN ASSISTANT
Payer: COMMERCIAL

## 2024-11-24 VITALS
TEMPERATURE: 98.5 F | OXYGEN SATURATION: 96 % | DIASTOLIC BLOOD PRESSURE: 82 MMHG | RESPIRATION RATE: 20 BRPM | SYSTOLIC BLOOD PRESSURE: 152 MMHG | HEART RATE: 90 BPM

## 2024-11-24 DIAGNOSIS — J01.90 ACUTE SINUSITIS WITH SYMPTOMS > 10 DAYS: ICD-10-CM

## 2024-11-24 DIAGNOSIS — R05.9 COUGH: ICD-10-CM

## 2024-11-24 PROCEDURE — 99213 OFFICE O/P EST LOW 20 MIN: CPT | Performed by: PHYSICIAN ASSISTANT

## 2024-11-24 PROCEDURE — 71046 X-RAY EXAM CHEST 2 VIEWS: CPT

## 2024-11-24 PROCEDURE — G0463 HOSPITAL OUTPT CLINIC VISIT: HCPCS | Mod: 25 | Performed by: PHYSICIAN ASSISTANT

## 2024-11-24 RX ORDER — BENZONATATE 200 MG/1
200 CAPSULE ORAL 3 TIMES DAILY PRN
Qty: 20 CAPSULE | Refills: 0 | Status: SHIPPED | OUTPATIENT
Start: 2024-11-24

## 2024-11-24 ASSESSMENT — ACTIVITIES OF DAILY LIVING (ADL)
ADLS_ACUITY_SCORE: 0
ADLS_ACUITY_SCORE: 0

## 2024-11-24 ASSESSMENT — ENCOUNTER SYMPTOMS
FEVER: 0
SINUS PRESSURE: 1
SINUS PAIN: 1
COUGH: 1
CONSTITUTIONAL NEGATIVE: 1

## 2024-11-24 ASSESSMENT — COLUMBIA-SUICIDE SEVERITY RATING SCALE - C-SSRS
6. HAVE YOU EVER DONE ANYTHING, STARTED TO DO ANYTHING, OR PREPARED TO DO ANYTHING TO END YOUR LIFE?: NO
2. HAVE YOU ACTUALLY HAD ANY THOUGHTS OF KILLING YOURSELF IN THE PAST MONTH?: NO
1. IN THE PAST MONTH, HAVE YOU WISHED YOU WERE DEAD OR WISHED YOU COULD GO TO SLEEP AND NOT WAKE UP?: NO

## 2024-11-24 NOTE — ED PROVIDER NOTES
History     Chief Complaint   Patient presents with    Cough     HPI  Orville Plasencia is a 79 year old male who presents to Urgent Care with complaints of ongoing productive cough for the past several weeks but worse over the past 6 days.  Patient also complains of associated nasal congestion, sinus congestion and pressure, and facial pain for the same amount of time.  Denies fevers, chills, nausea, vomiting, diarrhea, abdominal pain, chest pain, or shortness of breath.  Patient has tried over-the-counter cough medicine without improvement.  He denies history of asthma or underlying lung disease.  Wife is starting to experience similar symptoms.      Allergies:  Allergies   Allergen Reactions    Lipitor [Atorvastatin Calcium] Cramps     Leg cramps       Problem List:    Patient Active Problem List    Diagnosis Date Noted    Hyperparathyroidism (H) 06/18/2024     Priority: Medium    Chronic kidney disease, stage 3b (H) 12/09/2021     Priority: Medium    Type 2 diabetes mellitus with microalbuminuria, with long-term current use of insulin (H) 09/11/2018     Priority: Medium    Hyperlipidemia LDL goal <70 02/23/2016     Priority: Medium    Morbid obesity due to excess calories (H) 02/18/2016     Priority: Medium    Herpes zoster without complication 02/18/2016     Priority: Medium    Type 2 diabetes mellitus with hyperglycemia (H) 10/27/2015     Priority: Medium    Pancreatitis, gallstone 07/29/2014     Priority: Medium    Pseudoaneurysm (H) 02/27/2014     Priority: Medium    Anemia due to blood loss, acute 10/08/2013     Priority: Medium    Necrotizing pancreatitis 08/24/2013     Priority: Medium    Generalized weakness 08/23/2013     Priority: Medium    Anasarca 08/23/2013     Priority: Medium    GERD (gastroesophageal reflux disease) 07/02/2013     Priority: Medium    RADHA (obstructive sleep apnea) with REM related hypoventilation-Moderate (AHI 26) 10/03/2010     Priority: Medium    Sensorineural hearing loss  06/02/2006     Priority: Medium     Problem list name updated by automated process. Provider to review          Past Medical History:    Past Medical History:   Diagnosis Date    Anemia     Arrhythmia     Atrial fibrillation (H) 9/17/2013    Chronic infection     Chronic kidney disease     Diabetes mellitus     Difficulty in walking(719.7)     Gastro-oesophageal reflux disease     History of blood transfusion     Hypertension     Obese     Pancreatic disease     Pancreatitis     Sleep apnea     Thrombosis of leg        Past Surgical History:    Past Surgical History:   Procedure Laterality Date    COLONOSCOPY N/A 11/8/2018    Procedure: colonoscopy;  Surgeon: Adrian Chowdary MD;  Location: [facility]    ENDOSCOPIC INSERTION TUBE GASTROSTOMY  9/20/2013    Procedure: ENDOSCOPIC INSERTION TUBE GASTROSTOMY;  Exchange of Gastrojejunostomy Tube , dilation of cyst gastrostomy, endoscopic pancreatocolengiogram with dilation of cyst gastrostomy and stent removal, exchange of cyst duodenostomy stent;  Surgeon: Negro Munguia MD;  Location: [facility]    ENDOSCOPIC INSERTION TUBE JEJUNOSTOMY  8/19/2013    Procedure: ENDOSCOPIC INSERTION TUBE JEJUNOSTOMY;;  Surgeon: Drew Palacios MD;  Location: [facility]    ENDOSCOPIC RETROGRADE CHOLANGIOPANCREATOGRAM  8/19/2013    Procedure: ENDOSCOPIC RETROGRADE CHOLANGIOPANCREATOGRAM;;  Surgeon: Drew Palacios MD;  Location: [facility]    ENDOSCOPIC RETROGRADE CHOLANGIOPANCREATOGRAM  9/12/2013    Procedure: ENDOSCOPIC RETROGRADE CHOLANGIOPANCREATOGRAM;  Endoscopic Retrograde Cholangiopancreatogram with necrosectomy, dilation of wall of necrosis, stent removal x 4 and gastro-jejunal tube placement.;  Surgeon: Negro Munguia MD;  Location: [facility]    ENDOSCOPIC RETROGRADE CHOLANGIOPANCREATOGRAM  10/7/2013    Procedure: ENDOSCOPIC RETROGRADE CHOLANGIOPANCREATOGRAM;  endoscopic retrograde cholangiopancreatogram, cyst gastrostomy stent exchange, necrosectomy.;  Surgeon: Drew Palacios MD;  Location:  [facility]    ENDOSCOPIC RETROGRADE CHOLANGIOPANCREATOGRAM  11/18/2013    Procedure: ENDOSCOPIC RETROGRADE CHOLANGIOPANCREATOGRAM;  endoscopic retrograde cholangiopancreatogram with necrosectomy;  Surgeon: Drew Palacios MD;  Location[facility]    ENDOSCOPIC RETROGRADE CHOLANGIOPANCREATOGRAM  3/12/2014    Procedure: ENDOSCOPIC RETROGRADE CHOLANGIOPANCREATOGRAM;  Endoscopic Retrograde Cholangiopancreatogram with Stent Placement in Cyst Gastrostomy;  Surgeon: Winter Bowden MD;  [facility]    ENDOSCOPIC RETROGRADE CHOLANGIOPANCREATOGRAPHY, LITHOTRIPSY PANCREAS, COMBINED  9/19/2013    Procedure: COMBINED ENDOSCOPIC RETROGRADE CHOLANGIOPANCREATOGRAPHY, LITHOTRIPSY PANCREAS;  Endoscopic Retrograde Cholangiopancreatogram, placement of 2 stents in cyst gastrostomy;  Surgeon: Drew Palacios MD;  Location[facility]    ENDOSCOPIC ULTRASOUND UPPER GASTROINTESTINAL TRACT (GI)  7/22/2013    Procedure: ENDOSCOPIC ULTRASOUND UPPER GASTROINTESTINAL TRACT (GI);  Endoscopic Ultrasound;  Surgeon: Drew Palacios MD;  [facility]    ENDOSCOPIC ULTRASOUND UPPER GASTROINTESTINAL TRACT (GI)  8/19/2013    Procedure: ENDOSCOPIC ULTRASOUND UPPER GASTROINTESTINAL TRACT (GI);  Endoscopic ultrasound with fine needle aspiration,  Endorscopic retrograde cholangiopancreatogram with cystgastrostomy,      ENDOSCOPIC ULTRASOUND UPPER GASTROINTESTINAL TRACT (GI)  9/19/2013    Procedure: ENDOSCOPIC ULTRASOUND UPPER GASTROINTESTINAL TRACT (GI);  Endoscopic Ultrasound of Upper Gastrointestinal Tract;  Surgeon: Drew Palacios MD;  [facility]    ENDOSCOPIC ULTRASOUND UPPER GASTROINTESTINAL TRACT (GI)  8/7/2014    Procedure: ENDOSCOPIC ULTRASOUND, ESOPHAGOSCOPY / UPPER GASTROINTESTINAL TRACT (GI);  Surgeon: Adrian Plaza MD;  [facility]    ESOPHAGOSCOPY, GASTROSCOPY, DUODENOSCOPY (EGD), COMBINED  12/17/2013    Procedure: COMBINED ESOPHAGOSCOPY, GASTROSCOPY, DUODENOSCOPY (EGD);  EGD with stent removal and GJ tube replacement;  Surgeon:  Negro Munguia MD;  [facility]    ESOPHAGOSCOPY, GASTROSCOPY, DUODENOSCOPY (EGD), COMBINED  12/14/2013    Procedure: COMBINED ESOPHAGOSCOPY, GASTROSCOPY, DUODENOSCOPY (EGD);;  Surgeon: Winter Bowden MD;  [facility]    ESOPHAGOSCOPY, GASTROSCOPY, DUODENOSCOPY (EGD), COMBINED  7/29/2014    Procedure: COMBINED ESOPHAGOSCOPY, GASTROSCOPY, DUODENOSCOPY (EGD);  Surgeon: Negro Munguia MD;  [facility]    GASTROSTOMY TUBE      IR MISCELLANEOUS PROCEDURE  12/9/2013    LAPAROSCOPIC CHOLECYSTECTOMY  7/29/2014    Procedure: LAPAROSCOPIC CHOLECYSTECTOMY;  Surgeon: Olga Bruner MD;  Location: [facility]    PICC INSERTION  12/20/2013    5fr DL Power PICC, 41cm (1cm external) in the R basilic vein w/ tip in the  mid SVC.    ZZHC COLONOSCOPY THRU STOMA, DIAGNOSTIC  2007    Normal       Family History:    Family History   Problem Relation Age of Onset    Diabetes Mother     Eye Disorder Mother         blind from Diab.    Heart Disease Mother     Obesity Mother     Alcohol/Drug Father     Diabetes Maternal Grandmother     Eye Disorder Brother         Macular Degeneration    Cerebrovascular Disease Brother     Cancer Sister         Brain cancer    Other Cancer Sister         Brain  Cancer    C.A.D. Brother         2 bothers 1 sister in 60s    Cancer - colorectal No family hx of        Social History:  Marital Status:   [2]  Social History     Tobacco Use    Smoking status: Never     Passive exposure: Never    Smokeless tobacco: Never   Vaping Use    Vaping status: Never Used   Substance Use Topics    Alcohol use: Not Currently     Comment: rare    Drug use: Never        Medications:    amoxicillin-clavulanate (AUGMENTIN) 875-125 MG tablet  benzonatate (TESSALON) 200 MG capsule  amLODIPine (NORVASC) 5 MG tablet  blood glucose (ACCU-CHEK GUIDE) test strip  blood glucose (NO BRAND SPECIFIED) lancets standard  insulin aspart (NOVOLOG FLEXPEN) 100 UNIT/ML pen  insulin glargine (LANTUS  SOLOSTAR) 100 UNIT/ML pen  insulin pen needle (RELION PEN NEEDLES) 31G X 6 MM miscellaneous  liraglutide (VICTOZA) 18 MG/3ML solution  losartan (COZAAR) 50 MG tablet  tamsulosin (FLOMAX) 0.4 MG capsule          Review of Systems   Constitutional: Negative.  Negative for fever.   HENT:  Positive for congestion, sinus pressure and sinus pain.    Respiratory:  Positive for cough.    All other systems reviewed and are negative.      Physical Exam   BP: (!) 152/82  Pulse: 90  Temp: 98.5  F (36.9  C)  Resp: 20  SpO2: 96 %      Physical Exam  Constitutional:       General: He is not in acute distress.     Appearance: Normal appearance. He is well-developed. He is not ill-appearing, toxic-appearing or diaphoretic.   HENT:      Head: Normocephalic and atraumatic.      Right Ear: Tympanic membrane, ear canal and external ear normal.      Left Ear: Tympanic membrane, ear canal and external ear normal.      Nose: Mucosal edema, congestion and rhinorrhea present.      Mouth/Throat:      Lips: Pink.      Mouth: Mucous membranes are moist.      Pharynx: Uvula midline. Posterior oropharyngeal erythema present. No pharyngeal swelling, oropharyngeal exudate, uvula swelling or postnasal drip.      Tonsils: No tonsillar exudate or tonsillar abscesses.   Eyes:      Extraocular Movements: Extraocular movements intact.      Conjunctiva/sclera: Conjunctivae normal.      Pupils: Pupils are equal, round, and reactive to light.   Cardiovascular:      Rate and Rhythm: Normal rate and regular rhythm.      Heart sounds: Normal heart sounds.   Pulmonary:      Effort: Pulmonary effort is normal. No respiratory distress.      Breath sounds: Normal breath sounds. No stridor. No wheezing, rhonchi or rales.   Musculoskeletal:         General: Normal range of motion.      Cervical back: Full passive range of motion without pain, normal range of motion and neck supple. No rigidity. Normal range of motion.   Lymphadenopathy:      Cervical: No cervical  adenopathy.   Skin:     General: Skin is warm and dry.   Neurological:      Mental Status: He is alert and oriented to person, place, and time.   Psychiatric:         Behavior: Behavior is cooperative.         ED Course        Procedures      Results for orders placed or performed during the hospital encounter of 11/24/24 (from the past 24 hours)   XR Chest 2 Views    Narrative    EXAM: XR CHEST 2 VIEWS  LOCATION: [facility]  DATE: 11/24/2024    INDICATION: cough for several weeks  COMPARISON: None.      Impression    IMPRESSION: Negative chest.       Medications - No data to display    Assessments & Plan (with Medical Decision Making)     Pt is a 79 year old male who presents to Urgent Care with complaints of ongoing productive cough for the past several weeks but worse over the past 6 days.  Patient also complains of associated nasal congestion, sinus congestion and pressure, and facial pain for the same amount of time.        Pt is afebrile on arrival.  Exam as above.  Chest x-ray was negative for pneumonia or acute pathology.  Discussed results with patient.  Encouraged symptomatic treatments at home.  Return precautions were reviewed.  Hand-outs were provided.    Patient was sent with Augmentin, Tessalon and was instructed to follow-up with PCP for continued care and management.  He is to return to the ED for persistent and/or worsening symptoms.  Patient expressed understanding of the diagnosis and plan and was discharged home in good condition.     have reviewed the nursing notes.    I have reviewed the findings, diagnosis, plan and need for follow up with the patient.    Discharge Medication List as of 11/24/2024 12:17 PM        START taking these medications    Details   amoxicillin-clavulanate (AUGMENTIN) 875-125 MG tablet Take 1 tablet by mouth 2 times daily for 7 days., Disp-14 tablet, R-0, E-Prescribe      benzonatate (TESSALON) 200 MG capsule Take 1 capsule (200 mg) by mouth  3 times daily as needed for cough., Disp-20 capsule, R-0, E-Prescribe             Final diagnoses:   Acute sinusitis with symptoms > 10 days   Cough       11/24/2024   [facility] EMERGENCY DEPT      Disclaimer:  This note consists of symbols derived from keyboarding, dictation and/or voice recognition software.  As a result, there may be errors in the script that have gone undetected.  Please consider this when interpreting information found in this chart.     Ami Stewart PA-C  11/24/24 [id number]

## 2024-12-22 DIAGNOSIS — E11.29 TYPE 2 DIABETES MELLITUS WITH MICROALBUMINURIA, WITH LONG-TERM CURRENT USE OF INSULIN (H): ICD-10-CM

## 2024-12-22 DIAGNOSIS — Z79.4 TYPE 2 DIABETES MELLITUS WITH MICROALBUMINURIA, WITH LONG-TERM CURRENT USE OF INSULIN (H): ICD-10-CM

## 2024-12-22 DIAGNOSIS — R80.9 TYPE 2 DIABETES MELLITUS WITH MICROALBUMINURIA, WITH LONG-TERM CURRENT USE OF INSULIN (H): ICD-10-CM

## 2024-12-24 DIAGNOSIS — I10 HYPERTENSION, UNSPECIFIED TYPE: ICD-10-CM

## 2024-12-24 RX ORDER — AMLODIPINE BESYLATE 5 MG/1
TABLET ORAL DAILY
Qty: 135 TABLET | Refills: 0 | Status: SHIPPED | OUTPATIENT
Start: 2024-12-24

## 2024-12-24 RX ORDER — LIRAGLUTIDE 6 MG/ML
INJECTION SUBCUTANEOUS
Qty: 9 ML | Refills: 0 | Status: SHIPPED | OUTPATIENT
Start: 2024-12-24

## 2024-12-26 DIAGNOSIS — I10 HYPERTENSION, UNSPECIFIED TYPE: ICD-10-CM

## 2024-12-26 RX ORDER — AMLODIPINE BESYLATE 5 MG/1
TABLET ORAL DAILY
Qty: 135 TABLET | Refills: 0 | OUTPATIENT
Start: 2024-12-26

## 2024-12-26 NOTE — TELEPHONE ENCOUNTER
Pending Prescriptions:                       Disp   Refills    amLODIPine (NORVASC) 5 MG tablet          135 ta*0            Sig: Take by mouth daily.

## 2025-01-04 ENCOUNTER — HEALTH MAINTENANCE LETTER (OUTPATIENT)
Age: 80
End: 2025-01-04

## 2025-01-30 ENCOUNTER — OFFICE VISIT (OUTPATIENT)
Dept: FAMILY MEDICINE | Facility: CLINIC | Age: 80
End: 2025-01-30
Payer: MEDICARE

## 2025-01-30 VITALS
OXYGEN SATURATION: 97 % | RESPIRATION RATE: 20 BRPM | TEMPERATURE: 97.7 F | DIASTOLIC BLOOD PRESSURE: 72 MMHG | HEART RATE: 74 BPM | BODY MASS INDEX: 38.65 KG/M2 | HEIGHT: 70 IN | WEIGHT: 270 LBS | SYSTOLIC BLOOD PRESSURE: 124 MMHG

## 2025-01-30 DIAGNOSIS — Z79.4 TYPE 2 DIABETES MELLITUS WITH HYPERGLYCEMIA, WITH LONG-TERM CURRENT USE OF INSULIN (H): Primary | ICD-10-CM

## 2025-01-30 DIAGNOSIS — R80.9 TYPE 2 DIABETES MELLITUS WITH MICROALBUMINURIA, WITH LONG-TERM CURRENT USE OF INSULIN (H): ICD-10-CM

## 2025-01-30 DIAGNOSIS — N18.32 CHRONIC KIDNEY DISEASE, STAGE 3B (H): ICD-10-CM

## 2025-01-30 DIAGNOSIS — E11.29 TYPE 2 DIABETES MELLITUS WITH MICROALBUMINURIA, WITH LONG-TERM CURRENT USE OF INSULIN (H): ICD-10-CM

## 2025-01-30 DIAGNOSIS — I10 BENIGN ESSENTIAL HYPERTENSION: ICD-10-CM

## 2025-01-30 DIAGNOSIS — N40.0 BENIGN NON-NODULAR PROSTATIC HYPERPLASIA: ICD-10-CM

## 2025-01-30 DIAGNOSIS — E78.5 HYPERLIPIDEMIA LDL GOAL <70: ICD-10-CM

## 2025-01-30 DIAGNOSIS — E11.65 TYPE 2 DIABETES MELLITUS WITH HYPERGLYCEMIA, WITH LONG-TERM CURRENT USE OF INSULIN (H): Primary | ICD-10-CM

## 2025-01-30 DIAGNOSIS — Z79.4 TYPE 2 DIABETES MELLITUS WITH MICROALBUMINURIA, WITH LONG-TERM CURRENT USE OF INSULIN (H): ICD-10-CM

## 2025-01-30 LAB
ANION GAP SERPL CALCULATED.3IONS-SCNC: 13 MMOL/L (ref 7–15)
BUN SERPL-MCNC: 37.3 MG/DL (ref 8–23)
CALCIUM SERPL-MCNC: 9.2 MG/DL (ref 8.8–10.4)
CHLORIDE SERPL-SCNC: 105 MMOL/L (ref 98–107)
CHOLEST SERPL-MCNC: 122 MG/DL
CREAT SERPL-MCNC: 1.6 MG/DL (ref 0.67–1.17)
CREAT UR-MCNC: 194.6 MG/DL
EGFRCR SERPLBLD CKD-EPI 2021: 43 ML/MIN/1.73M2
EST. AVERAGE GLUCOSE BLD GHB EST-MCNC: 174 MG/DL
FASTING STATUS PATIENT QL REPORTED: YES
FASTING STATUS PATIENT QL REPORTED: YES
GLUCOSE SERPL-MCNC: 99 MG/DL (ref 70–99)
HBA1C MFR BLD: 7.7 % (ref 0–5.6)
HCO3 SERPL-SCNC: 23 MMOL/L (ref 22–29)
HDLC SERPL-MCNC: 38 MG/DL
LDLC SERPL CALC-MCNC: 63 MG/DL
MICROALBUMIN UR-MCNC: <12 MG/L
MICROALBUMIN/CREAT UR: NORMAL MG/G{CREAT}
NONHDLC SERPL-MCNC: 84 MG/DL
POTASSIUM SERPL-SCNC: 5.2 MMOL/L (ref 3.4–5.3)
SODIUM SERPL-SCNC: 141 MMOL/L (ref 135–145)
TRIGL SERPL-MCNC: 105 MG/DL

## 2025-01-30 RX ORDER — LIRAGLUTIDE 6 MG/ML
1.8 INJECTION SUBCUTANEOUS DAILY
Qty: 9 ML | Refills: 3 | Status: SHIPPED | OUTPATIENT
Start: 2025-01-30

## 2025-01-30 RX ORDER — AMLODIPINE BESYLATE 5 MG/1
5 TABLET ORAL DAILY
Qty: 135 TABLET | Refills: 3 | Status: SHIPPED | OUTPATIENT
Start: 2025-01-30

## 2025-01-30 RX ORDER — LOSARTAN POTASSIUM 100 MG/1
100 TABLET ORAL DAILY
Qty: 90 TABLET | Refills: 3 | Status: SHIPPED | OUTPATIENT
Start: 2025-01-30

## 2025-01-30 RX ORDER — TAMSULOSIN HYDROCHLORIDE 0.4 MG/1
CAPSULE ORAL
Qty: 180 CAPSULE | Refills: 3 | Status: SHIPPED | OUTPATIENT
Start: 2025-01-30

## 2025-01-30 ASSESSMENT — PAIN SCALES - GENERAL: PAINLEVEL_OUTOF10: NO PAIN (0)

## 2025-01-30 NOTE — PROGRESS NOTES
Orville was seen today for diabetes, medication question, otc medication question, hypertension and imm/inj.    Diagnoses and all orders for this visit:    Type 2 diabetes mellitus with hyperglycemia, with long-term current use of insulin (H)  -     Hemoglobin A1c  -     Albumin Random Urine Quantitative with Creat Ratio  -     A1C improved .     Hyperlipidemia LDL goal <70  -     Lipid panel reflex to direct LDL Fasting    Benign essential hypertension  -     Basic metabolic panel  (Ca, Cl, CO2, Creat, Gluc, K, Na, BUN)  -     amLODIPine (NORVASC) 5 MG tablet; Take 1 tablet (5 mg) by mouth daily.  -     losartan (COZAAR) 100 MG tablet; Take 1 tablet (100 mg) by mouth daily.  -     Blood pressure is within normal limits.     Type 2 diabetes mellitus with microalbuminuria, with long-term current use of insulin (H)  -     liraglutide (VICTOZA) 18 MG/3ML solution; Inject 1.8 mg subcutaneously daily.    Benign non-nodular prostatic hyperplasia  -     tamsulosin (FLOMAX) 0.4 MG capsule; Take 2 capsules by mouth once daily- Please prescribe when patient requests    Other orders  -     INFLUENZA HIGH DOSE, TRIVALENT, PF (FLUZONE)    Chronic kidney disease, stage 3b (H)  -      stable no new symptoms.                Subjective   Orville is a 79 year old, presenting for the following health issues:    Patient is an 79-year-old male presenting to the clinic for recheck of his diabetes.    Diabetes  He is on Insulin and Victoza for management  of his diabetes.      He reports no issues with his medication other than the fact that he sometimes gets low sugars especially when he is working outside and does not eat lunch.  He  takes  mealtime insulin  and I recommended that he skips the meal time insulin if he has not eaten yet        His wife mentioned that the insurance needs a prior authorization for the Victoza.    Hypertension  Blood pressure looks good today. Medication was refilled.    Lymphedema  He has chronic lymphedema,  his wife wants to try a over-the-counter medication called Purehealth according to her as she had a relative who used it and it helped with getting rid of excess fluids.  I asked that she try and see if it will help Orville's legs.  They had no other complaints today.        Diabetes (Recheck on diabetes.  When his blood sugars get lower he will have dizziness.  He will be outside working with wood and will not stop for lunch and that is when he can have issues.  Eating food will help.  This am his sugar was 75 as he was fasting for today.), Medication Question (Question about his Victoza.  They have new insurance this year and the pharmacy was wanting to know if there is a medication that is comparable to the Victoza to change to.  If not could a letter be sent to the insurance company stating why he needs to be on the Victoza.  He is on the last refill of the medication.), OTC medication question (They have a question about an Online medication called Pure Health.  This is to help with swelling in the lower legs/feet.  A family member is taking this and it has worked well for her.  Wanting to know if Orville can take this with his current medications.), and Hypertension (Recheck on blood pressure.)        1/30/2025     9:48 AM   Additional Questions   Roomed by Mandy Walter CMA   Accompanied by Socorro-spouse.     Via the Health Maintenance questionnaire, the patient has reported the following services have been completed -Eye Exam: [facility] 2024-08-29, this information has been sent to the abstraction team.  History of Present Illness       Diabetes:   He presents for follow up of diabetes.  He is checking home blood glucose three times daily.   He checks blood glucose before meals.  Blood glucose is sometimes over 200 and sometimes under 70. He is aware of hypoglycemia symptoms including shakiness.   He is concerned about low blood sugar, several less than 70 in the past few weeks.    He is not experiencing  "numbness or burning in feet, excessive thirst, blurry vision, weight changes or redness, sores or blisters on feet. The patient has had a diabetic eye exam in the last 12 months. Eye exam performed on August 2024. Location of last eye exam [facility].        Reason for visit:  Check blood sugar and discuss occasional dizziness    He eats 4 or more servings of fruits and vegetables daily.He consumes 0 sweetened beverage(s) daily.He exercises with enough effort to increase his heart rate 20 to 29 minutes per day.  He exercises with enough effort to increase his heart rate 4 days per week.   He is taking medications regularly.       Hypertension Follow-up    Do you check your blood pressure regularly outside of the clinic? Yes, not very often.  Are you following a low salt diet? Not adding extra salt.  Are your blood pressures ever more than 140 on the top number (systolic) OR more   than 90 on the bottom number (diastolic), for example 140/90? No, is 120-130/80.        Review of Systems  CONSTITUTIONAL: NEGATIVE for fever, chills, change in weight  INTEGUMENTARY/SKIN: NEGATIVE for worrisome rashes, moles or lesions  EYES: NEGATIVE for vision changes or irritation  ENT/MOUTH: NEGATIVE for ear, mouth and throat problems  RESP: NEGATIVE for significant cough or SOB  CV: NEGATIVE for chest pain, palpitations or peripheral edema  GI: NEGATIVE for nausea, abdominal pain, heartburn, or change in bowel habits  : NEGATIVE for frequency, dysuria, or hematuria  MUSCULOSKELETAL: NEGATIVE for significant arthralgias or myalgia  NEURO: NEGATIVE for weakness, dizziness or paresthesias  ENDOCRINE: NEGATIVE for temperature intolerance, skin/hair changes  HEME: NEGATIVE for bleeding problems  PSYCHIATRIC: NEGATIVE for changes in mood or affect      Objective    /72 (BP Location: Left arm, Patient Position: Chair, Cuff Size: Adult Large)   Pulse 74   Temp 97.7  F (36.5  C) (Tympanic)   Resp 20   Ht 1.778 m (5' 10\")   " Wt 122.5 kg (270 lb)   SpO2 97%   BMI 38.74 kg/m    Body mass index is 38.74 kg/m .  Physical Exam   GENERAL: alert and no distress  EYES: Eyes grossly normal to inspection, PERRL and conjunctivae and sclerae normal  HENT: ear canals and TM's normal, nose and mouth without ulcers or lesions  NECK: no adenopathy, no asymmetry, masses, or scars  RESP: lungs clear to auscultation - no rales, rhonchi or wheezes  CV: regular rate and rhythm, normal S1 S2, no S3 or S4, no murmur, click or rub, no peripheral edema  ABDOMEN: soft, nontender, no hepatosplenomegaly, no masses and bowel sounds normal  MS: no gross musculoskeletal defects noted, no edema  SKIN: no suspicious lesions or rashes    Results for orders placed or performed in visit on 01/30/25 (from the past 24 hours)   Hemoglobin A1c   Result Value Ref Range    Estimated Average Glucose 174 (H) <117 mg/dL    Hemoglobin A1C 7.7 (H) 0.0 - 5.6 %   Basic metabolic panel  (Ca, Cl, CO2, Creat, Gluc, K, Na, BUN)   Result Value Ref Range    Sodium 141 135 - 145 mmol/L    Potassium 5.2 3.4 - 5.3 mmol/L    Chloride 105 98 - 107 mmol/L    Carbon Dioxide (CO2) 23 22 - 29 mmol/L    Anion Gap 13 7 - 15 mmol/L    Urea Nitrogen 37.3 (H) 8.0 - 23.0 mg/dL    Creatinine 1.60 (H) 0.67 - 1.17 mg/dL    GFR Estimate 43 (L) >60 mL/min/1.73m2    Calcium 9.2 8.8 - 10.4 mg/dL    Glucose 99 70 - 99 mg/dL    Patient Fasting > 8hrs? Yes    Lipid panel reflex to direct LDL Fasting   Result Value Ref Range    Cholesterol 122 <200 mg/dL    Triglycerides 105 <150 mg/dL    Direct Measure HDL 38 (L) >=40 mg/dL    LDL Cholesterol Calculated 63 <100 mg/dL    Non HDL Cholesterol 84 <130 mg/dL    Patient Fasting > 8hrs? Yes     Narrative    Cholesterol  Desirable: < 200 mg/dL  Borderline High: 200 - 239 mg/dL  High: >= 240 mg/dL    Triglycerides  Normal: < 150 mg/dL  Borderline High: 150 - 199 mg/dL  High: 200-499 mg/dL  Very High: >= 500 mg/dL    Direct Measure HDL  Female: >= 50 mg/dL   Male: >= 40  mg/dL    LDL Cholesterol  Desirable: < 100 mg/dL  Above Desirable: 100 - 129 mg/dL   Borderline High: 130 - 159 mg/dL   High:  160 - 189 mg/dL   Very High: >= 190 mg/dL    Non HDL Cholesterol  Desirable: < 130 mg/dL  Above Desirable: 130 - 159 mg/dL  Borderline High: 160 - 189 mg/dL  High: 190 - 219 mg/dL  Very High: >= 220 mg/dL   Albumin Random Urine Quantitative with Creat Ratio   Result Value Ref Range    Creatinine Urine mg/dL 194.6 mg/dL    Albumin Urine mg/L <12.0 mg/L    Albumin Urine mg/g Cr             Signed Electronically by: Rama Michel MD

## 2025-01-31 ENCOUNTER — TELEPHONE (OUTPATIENT)
Dept: FAMILY MEDICINE | Facility: CLINIC | Age: 80
End: 2025-01-31
Payer: COMMERCIAL

## 2025-01-31 NOTE — TELEPHONE ENCOUNTER
Amlodipine 5 MG  ---per pharmacy please clarify the quantity and directions. Does the patient take 1 every day or 1.5 every day?   thanks

## 2025-03-25 RX ORDER — FUROSEMIDE 20 MG/1
20 TABLET ORAL DAILY PRN
Qty: 90 TABLET | Refills: 1 | OUTPATIENT
Start: 2025-03-25

## 2025-03-25 NOTE — TELEPHONE ENCOUNTER
Medication Question or Refill    Contacts       Contact Date/Time Type Contact Phone/Fax    2025 08:29 AM CDT Phone (Incoming) POLO MELENDEZ (Emergency Contact) [phone] (H)            What medication are you calling about (include dose and sig)?: Furosemide 20 mg    Preferred Pharmacy:   [facility] [address] [place], [place] [address], [place] - [address]  [address] [place] [address]  Phone: [phone] Fax: [phone]      Controlled Substance Agreement on file:   CSA -- Patient Level:    CSA: None found at the patient level.       Who prescribed the medication?: Dr. Sky    Do you need a refill? Yes    When did you use the medication last? Last filled 3- Rx has     Patient offered an appointment? No    Do you have any questions or concerns?  Yes: Patient would like refill. Wife states it helps edema in legs      Could we send this information to you in [facility] or would you prefer to receive a phone call?:   Patient would prefer a phone call   Okay to leave a detailed message?: Yes at Home number on file [phone] (home)

## 2025-03-27 NOTE — TELEPHONE ENCOUNTER
Pt spouse calling again about this and wants Dr. Michel to review.    Shannen Cornejo on 3/27/2025 at 9:56 AM

## 2025-04-02 DIAGNOSIS — Z79.4 TYPE 2 DIABETES MELLITUS WITH HYPERGLYCEMIA, WITH LONG-TERM CURRENT USE OF INSULIN (H): ICD-10-CM

## 2025-04-02 DIAGNOSIS — E11.65 TYPE 2 DIABETES MELLITUS WITH HYPERGLYCEMIA, WITH LONG-TERM CURRENT USE OF INSULIN (H): ICD-10-CM

## 2025-04-03 RX ORDER — PEN NEEDLE, DIABETIC 32GX 5/32"
NEEDLE, DISPOSABLE MISCELLANEOUS
Qty: 150 EACH | Refills: 2 | Status: SHIPPED | OUTPATIENT
Start: 2025-04-03

## 2025-04-03 NOTE — TELEPHONE ENCOUNTER
Dose verified.   Prescription approved per [facility] Refill Protocol.  Julie Behrendt RN

## 2025-04-05 DIAGNOSIS — Z79.4 TYPE 2 DIABETES MELLITUS WITH HYPERGLYCEMIA, WITH LONG-TERM CURRENT USE OF INSULIN (H): Primary | ICD-10-CM

## 2025-04-05 DIAGNOSIS — E11.65 TYPE 2 DIABETES MELLITUS WITH HYPERGLYCEMIA, WITH LONG-TERM CURRENT USE OF INSULIN (H): Primary | ICD-10-CM

## 2025-04-05 NOTE — PROGRESS NOTES
"Orville Mcdonnell has a lab appointment with us 04/07. The appointment note says \"labs for [place]\". Please review and place orders if needed.  Thank you,  [facility]  "

## 2025-04-07 ENCOUNTER — LAB (OUTPATIENT)
Dept: LAB | Facility: CLINIC | Age: 80
End: 2025-04-07
Payer: MEDICARE

## 2025-04-07 DIAGNOSIS — E11.65 TYPE 2 DIABETES MELLITUS WITH HYPERGLYCEMIA, WITH LONG-TERM CURRENT USE OF INSULIN (H): ICD-10-CM

## 2025-04-07 DIAGNOSIS — Z79.4 TYPE 2 DIABETES MELLITUS WITH HYPERGLYCEMIA, WITH LONG-TERM CURRENT USE OF INSULIN (H): ICD-10-CM

## 2025-04-07 LAB
EST. AVERAGE GLUCOSE BLD GHB EST-MCNC: 169 MG/DL
HBA1C MFR BLD: 7.5 % (ref 0–5.6)

## 2025-04-07 PROCEDURE — 83036 HEMOGLOBIN GLYCOSYLATED A1C: CPT

## 2025-04-07 PROCEDURE — 36415 COLL VENOUS BLD VENIPUNCTURE: CPT

## 2025-04-08 DIAGNOSIS — R60.9 EDEMA, UNSPECIFIED TYPE: Primary | ICD-10-CM

## 2025-04-08 RX ORDER — FUROSEMIDE 20 MG/1
20 TABLET ORAL DAILY
Qty: 60 TABLET | Refills: 1 | Status: SHIPPED | OUTPATIENT
Start: 2025-04-08

## 2025-05-28 ENCOUNTER — TELEPHONE (OUTPATIENT)
Dept: FAMILY MEDICINE | Facility: CLINIC | Age: 80
End: 2025-05-28
Payer: COMMERCIAL

## 2025-05-28 NOTE — PROGRESS NOTES
SUBJECTIVE:   Orville Plasencia is a 73 year old male who presents to clinic today for the following health issues:      Diabetes Follow-up      Patient is checking blood sugars: 2-3 times a day    Diabetic concerns: None and other -  on occasion when not home     Symptoms of hypoglycemia (low blood sugar): none     Paresthesias (numbness or burning in feet) or sores: No     Date of last diabetic eye exam: due, will ashley    Forgets to take novolog at times when he is out working or goes out to eat    Diabetes Management Resources    Hyperlipidemia Follow-Up      Rate your low fat/cholesterol diet?: not monitoring fat    Taking statin?  No    Other lipid medications/supplements?:  None  LDL Cholesterol Calculated   Date Value Ref Range Status   03/16/2018 44 <100 mg/dL Final     Comment:     Desirable:       <100 mg/dl             Hypertension Follow-up      Outpatient blood pressures are not being checked.    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     -------------------------------------    Problem list and histories reviewed & adjusted, as indicated.  Additional history: as documented    Patient Active Problem List   Diagnosis     Sensorineural hearing loss     RADHA (obstructive sleep apnea) with REM related hypoventilation-Moderate (AHI 26)     Advanced directives, counseling/discussion     GERD (gastroesophageal reflux disease)     Generalized weakness     Anasarca     SVT (supraventricular tachycardia) (H)      Necrotizing pancreatitis     Atrial fibrillation (H)     Anemia due to blood loss, acute     Health Care Home     Pseudoaneurysm (H)     Diastolic heart failure (H)     Chronic pancreatitis (H)     Pancreatitis, gallstone     Type 2 diabetes mellitus with hyperglycemia (H)     Morbid obesity due to excess calories (H)     Herpes zoster without complication     Hyperlipidemia LDL goal <70     Type 2 diabetes mellitus with microalbuminuria, with long-term current use of insulin (H)     Past Surgical History:   Procedure Laterality Date     COLONOSCOPY N/A 11/8/2018    Procedure: colonoscopy;  Surgeon: Adrian Chowdary MD;  Location: [facility]     ENDOSCOPIC INSERTION TUBE GASTROSTOMY  9/20/2013    Procedure: ENDOSCOPIC INSERTION TUBE GASTROSTOMY;  Exchange of Gastrojejunostomy Tube , dilation of cyst gastrostomy, endoscopic pancreatocolengiogram with dilation of cyst gastrostomy and stent removal, exchange of cyst duodenostomy stent;  Surgeon: Negro Munguia MD;  [facility]     ENDOSCOPIC INSERTION TUBE JEJUNOSTOMY  8/19/2013    Procedure: ENDOSCOPIC INSERTION TUBE JEJUNOSTOMY;;  Surgeon: Drew Palacios MD;  [facility]     ENDOSCOPIC RETROGRADE CHOLANGIOPANCREATOGRAM  8/19/2013    Procedure: ENDOSCOPIC RETROGRADE CHOLANGIOPANCREATOGRAM;;  Surgeon: Drew Palacios MD;  [facility]     ENDOSCOPIC RETROGRADE CHOLANGIOPANCREATOGRAM  9/12/2013    Procedure: ENDOSCOPIC RETROGRADE CHOLANGIOPANCREATOGRAM;  Endoscopic Retrograde Cholangiopancreatogram with necrosectomy, dilation of wall of necrosis, stent removal x 4 and gastro-jejunal tube placement.;  Surgeon: Negro Munguia MD;  [facility]     ENDOSCOPIC RETROGRADE CHOLANGIOPANCREATOGRAM  10/7/2013    Procedure: ENDOSCOPIC RETROGRADE CHOLANGIOPANCREATOGRAM;  endoscopic retrograde cholangiopancreatogram, cyst gastrostomy stent exchange, necrosectomy.;  Surgeon: Drew Palacios MD;  [facility]     ENDOSCOPIC RETROGRADE CHOLANGIOPANCREATOGRAM   11/18/2013    Procedure: ENDOSCOPIC RETROGRADE CHOLANGIOPANCREATOGRAM;  endoscopic retrograde cholangiopancreatogram with necrosectomy;  Surgeon: Drew Palacios MD;  [facility]     ENDOSCOPIC RETROGRADE CHOLANGIOPANCREATOGRAM  3/12/2014    Procedure: ENDOSCOPIC RETROGRADE CHOLANGIOPANCREATOGRAM;  Endoscopic Retrograde Cholangiopancreatogram with Stent Placement in Cyst Gastrostomy;  Surgeon: Winter Bowden MD;  [facility]     ENDOSCOPIC RETROGRADE CHOLANGIOPANCREATOGRAPHY, LITHOTRIPSY PANCREAS, COMBINED  9/19/2013    Procedure: COMBINED ENDOSCOPIC RETROGRADE CHOLANGIOPANCREATOGRAPHY, LITHOTRIPSY PANCREAS;  Endoscopic Retrograde Cholangiopancreatogram, placement of 2 stents in cyst gastrostomy;  Surgeon: Drew Palacios MD;  [facility]     ENDOSCOPIC ULTRASOUND UPPER GASTROINTESTINAL TRACT (GI)  7/22/2013    Procedure: ENDOSCOPIC ULTRASOUND UPPER GASTROINTESTINAL TRACT (GI);  Endoscopic Ultrasound;  Surgeon: Drew Palacios MD;  [facility]     ENDOSCOPIC ULTRASOUND UPPER GASTROINTESTINAL TRACT (GI)  8/19/2013    Procedure: ENDOSCOPIC ULTRASOUND UPPER GASTROINTESTINAL TRACT (GI);  Endoscopic ultrasound with fine needle aspiration,  Endorscopic retrograde cholangiopancreatogram with cystgastrostomy,       ENDOSCOPIC ULTRASOUND UPPER GASTROINTESTINAL TRACT (GI)  9/19/2013    Procedure: ENDOSCOPIC ULTRASOUND UPPER GASTROINTESTINAL TRACT (GI);  Endoscopic Ultrasound of Upper Gastrointestinal Tract;  Surgeon: Drew Palacios MD;  [facility]     ENDOSCOPIC ULTRASOUND UPPER GASTROINTESTINAL TRACT (GI)  8/7/2014    Procedure: ENDOSCOPIC ULTRASOUND, ESOPHAGOSCOPY / UPPER GASTROINTESTINAL TRACT (GI);  Surgeon: Adrian Plaza MD;  [facility]     ESOPHAGOSCOPY, GASTROSCOPY, DUODENOSCOPY (EGD), COMBINED  12/17/2013    Procedure: COMBINED ESOPHAGOSCOPY, GASTROSCOPY, DUODENOSCOPY (EGD);  EGD with stent removal and GJ tube replacement;  Surgeon: Negro Munguia MD;  [facility]      "ESOPHAGOSCOPY, GASTROSCOPY, DUODENOSCOPY (EGD), COMBINED  12/14/2013    Procedure: COMBINED ESOPHAGOSCOPY, GASTROSCOPY, DUODENOSCOPY (EGD);;  Surgeon: Winter Bowden MD;  [facility]     ESOPHAGOSCOPY, GASTROSCOPY, DUODENOSCOPY (EGD), COMBINED  7/29/2014    Procedure: COMBINED ESOPHAGOSCOPY, GASTROSCOPY, DUODENOSCOPY (EGD);  Surgeon: Negro Munguia MD;  [facility]     GASTROSTOMY TUBE       HC COLONOSCOPY THRU STOMA, DIAGNOSTIC  2007    Normal     LAPAROSCOPIC CHOLECYSTECTOMY  7/29/2014    Procedure: LAPAROSCOPIC CHOLECYSTECTOMY;  Surgeon: Olga Bruner MD;  [facility]     PICC INSERTION  12/20/2013    5fr DL Power PICC, 41cm (1cm external) in the R basilic vein w/ tip in the  mid SVC.       Social History     Tobacco Use     Smoking status: Never Smoker     Smokeless tobacco: Never Used   Substance Use Topics     Alcohol use: Yes     Comment: rare     Family History   Problem Relation Age of Onset     Diabetes Mother      Eye Disorder Mother         blind from Diab.     Heart Disease Mother      Obesity Mother      Alcohol/Drug Father      Diabetes Maternal Grandmother      Eye Disorder Brother         Macular Degeneration     Cancer Sister         Brain cancer     C.A.D. Brother         2 bothers 1 sister in 60s     Cancer - colorectal No family hx of            Reviewed and updated as needed this visit by clinical staff       Reviewed and updated as needed this visit by Provider         ROS:  Constitutional, HEENT, cardiovascular, pulmonary, gi and gu systems are negative, except as otherwise noted.    OBJECTIVE:     /72 (BP Location: Left arm, Patient Position: Chair, Cuff Size: Adult Large)   Pulse 64   Temp 97  F (36.1  C) (Tympanic)   Resp 8   Ht 1.753 m (5' 9\")   Wt 112.9 kg (249 lb)   SpO2 99%   BMI 36.77 kg/m    Body mass index is 36.77 kg/m .  GENERAL: alert, no distress and obese  NECK: no adenopathy, no asymmetry, masses, or scars and thyroid normal to " palpation  RESP: lungs clear to auscultation - no rales, rhonchi or wheezes  CV: regular rate and rhythm, normal S1 S2, no S3 or S4, no murmur, click or rub, no peripheral edema and peripheral pulses strong  MS: no gross musculoskeletal defects noted, no edema    Diagnostic Test Results:  none     ASSESSMENT/PLAN:         1. Type 2 diabetes mellitus with hyperglycemia, with long-term current use of insulin (H)  - patient reports forgetting to take Novolog with meals at times   - insulin glargine (BASAGLAR KWIKPEN) 100 UNIT/ML pen; Inject 20 Units Subcutaneous 2 times daily  Dispense: 36 mL; Refill: 3  - insulin aspart (NOVOLOG FLEXPEN) 100 UNIT/ML pen; TAKE 7 UNITS OF NOVOLOG ALONG WITH USING SLIDING SCALE (MAX TOTAL DOSE OF 13 UNITS EACH INJECTION) WITH BREAKFAST, LUNCH AND SUPPER.  Dispense: 30 mL; Refill: 3  - blood glucose monitoring (ACCU-CHEK MULTICLIX) lancets; Use to test blood sugar 3 times daily  Dispense: 204 each; Refill: 3  - Basic metabolic panel  - Hemoglobin A1c    2. Hypertension goal BP (blood pressure) < 140/90  controlled  - furosemide (LASIX) 20 MG tablet; Take 1 tablet (20 mg) by mouth daily as needed  Dispense: 90 tablet; Refill: 3    3. Benign non-nodular prostatic hyperplasia  stable  - tamsulosin (FLOMAX) 0.4 MG capsule; TAKE ONE CAPSULE BY MOUTH ONCE DAILY  Dispense: 90 capsule; Refill: 3    4. Hyperlipidemia LDL goal <100  Tolerating statin  - Lipid panel reflex to direct LDL Fasting  - ALT    5. Type 2 diabetes mellitus with diabetic peripheral angiopathy without gangrene, with long-term current use of insulin (H)   patient reports forgetting to take Novolog with meals at times   - insulin glargine (BASAGLAR KWIKPEN) 100 UNIT/ML pen; Inject 20 Units Subcutaneous 2 times daily  Dispense: 36 mL; Refill: 3  - insulin aspart (NOVOLOG FLEXPEN) 100 UNIT/ML pen; TAKE 7 UNITS OF NOVOLOG ALONG WITH USING SLIDING SCALE (MAX TOTAL DOSE OF 13 UNITS EACH INJECTION) WITH BREAKFAST, LUNCH AND SUPPER.   Dispense: 30 mL; Refill: 3  - blood glucose monitoring (ACCU-CHEK MULTICLIX) lancets; Use to test blood sugar 3 times daily  Dispense: 204 each; Refill: 3  - Basic metabolic panel  - Hemoglobin A1c    6. 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

## 2025-05-28 NOTE — TELEPHONE ENCOUNTER
Patient Quality Outreach    Patient is due for the following:   Physical Annual Wellness Visit    Action(s) Taken:   Schedule a Annual Wellness Visit    Type of outreach:    Sent [employer] message.    Questions for provider review:    None         Mandy Walter, [facility]  Chart routed to None.

## 2025-06-16 SDOH — HEALTH STABILITY: PHYSICAL HEALTH: ON AVERAGE, HOW MANY DAYS PER WEEK DO YOU ENGAGE IN MODERATE TO STRENUOUS EXERCISE (LIKE A BRISK WALK)?: 0 DAYS

## 2025-06-16 SDOH — HEALTH STABILITY: PHYSICAL HEALTH: ON AVERAGE, HOW MANY MINUTES DO YOU ENGAGE IN EXERCISE AT THIS LEVEL?: 0 MIN

## 2025-06-17 ENCOUNTER — OFFICE VISIT (OUTPATIENT)
Dept: FAMILY MEDICINE | Facility: CLINIC | Age: 80
End: 2025-06-17
Payer: MEDICARE

## 2025-06-17 VITALS
RESPIRATION RATE: 20 BRPM | TEMPERATURE: 97.8 F | HEIGHT: 70 IN | SYSTOLIC BLOOD PRESSURE: 118 MMHG | DIASTOLIC BLOOD PRESSURE: 72 MMHG | HEART RATE: 76 BPM | WEIGHT: 276 LBS | OXYGEN SATURATION: 96 % | BODY MASS INDEX: 39.51 KG/M2

## 2025-06-17 DIAGNOSIS — Z79.4 TYPE 2 DIABETES MELLITUS WITH MICROALBUMINURIA, WITH LONG-TERM CURRENT USE OF INSULIN (H): ICD-10-CM

## 2025-06-17 DIAGNOSIS — N18.32 CHRONIC KIDNEY DISEASE, STAGE 3B (H): ICD-10-CM

## 2025-06-17 DIAGNOSIS — E11.29 TYPE 2 DIABETES MELLITUS WITH MICROALBUMINURIA, WITH LONG-TERM CURRENT USE OF INSULIN (H): ICD-10-CM

## 2025-06-17 DIAGNOSIS — R80.9 TYPE 2 DIABETES MELLITUS WITH MICROALBUMINURIA, WITH LONG-TERM CURRENT USE OF INSULIN (H): ICD-10-CM

## 2025-06-17 DIAGNOSIS — E78.5 HYPERLIPIDEMIA LDL GOAL <70: ICD-10-CM

## 2025-06-17 DIAGNOSIS — Z00.00 ENCOUNTER FOR MEDICARE ANNUAL WELLNESS EXAM: Primary | ICD-10-CM

## 2025-06-17 LAB
ALBUMIN SERPL BCG-MCNC: 4.2 G/DL (ref 3.5–5.2)
ALP SERPL-CCNC: 101 U/L (ref 40–150)
ALT SERPL W P-5'-P-CCNC: 20 U/L (ref 0–70)
ANION GAP SERPL CALCULATED.3IONS-SCNC: 8 MMOL/L (ref 7–15)
AST SERPL W P-5'-P-CCNC: 25 U/L (ref 0–45)
BILIRUB SERPL-MCNC: 0.5 MG/DL
BUN SERPL-MCNC: 34.5 MG/DL (ref 8–23)
CALCIUM SERPL-MCNC: 9.4 MG/DL (ref 8.8–10.4)
CHLORIDE SERPL-SCNC: 103 MMOL/L (ref 98–107)
CHOLEST SERPL-MCNC: 108 MG/DL
CREAT SERPL-MCNC: 1.37 MG/DL (ref 0.67–1.17)
CREAT UR-MCNC: 98.3 MG/DL
EGFRCR SERPLBLD CKD-EPI 2021: 52 ML/MIN/1.73M2
EST. AVERAGE GLUCOSE BLD GHB EST-MCNC: 169 MG/DL
FASTING STATUS PATIENT QL REPORTED: YES
FASTING STATUS PATIENT QL REPORTED: YES
GLUCOSE SERPL-MCNC: 89 MG/DL (ref 70–99)
HBA1C MFR BLD: 7.5 % (ref 0–5.6)
HCO3 SERPL-SCNC: 28 MMOL/L (ref 22–29)
HDLC SERPL-MCNC: 39 MG/DL
LDLC SERPL CALC-MCNC: 51 MG/DL
MICROALBUMIN UR-MCNC: 15.3 MG/L
MICROALBUMIN/CREAT UR: 15.56 MG/G CR (ref 0–17)
NONHDLC SERPL-MCNC: 69 MG/DL
POTASSIUM SERPL-SCNC: 5.4 MMOL/L (ref 3.4–5.3)
PROT SERPL-MCNC: 7.2 G/DL (ref 6.4–8.3)
SODIUM SERPL-SCNC: 139 MMOL/L (ref 135–145)
TRIGL SERPL-MCNC: 92 MG/DL

## 2025-06-17 PROCEDURE — 80061 LIPID PANEL: CPT | Performed by: FAMILY MEDICINE

## 2025-06-17 PROCEDURE — 3048F LDL-C <100 MG/DL: CPT | Performed by: FAMILY MEDICINE

## 2025-06-17 PROCEDURE — G0438 PPPS, INITIAL VISIT: HCPCS | Performed by: FAMILY MEDICINE

## 2025-06-17 PROCEDURE — 83036 HEMOGLOBIN GLYCOSYLATED A1C: CPT | Performed by: FAMILY MEDICINE

## 2025-06-17 PROCEDURE — 80053 COMPREHEN METABOLIC PANEL: CPT | Performed by: FAMILY MEDICINE

## 2025-06-17 PROCEDURE — 99213 OFFICE O/P EST LOW 20 MIN: CPT | Mod: 25 | Performed by: FAMILY MEDICINE

## 2025-06-17 PROCEDURE — 3078F DIAST BP <80 MM HG: CPT | Performed by: FAMILY MEDICINE

## 2025-06-17 PROCEDURE — 36415 COLL VENOUS BLD VENIPUNCTURE: CPT | Performed by: FAMILY MEDICINE

## 2025-06-17 PROCEDURE — 82043 UR ALBUMIN QUANTITATIVE: CPT | Performed by: FAMILY MEDICINE

## 2025-06-17 PROCEDURE — 1126F AMNT PAIN NOTED NONE PRSNT: CPT | Performed by: FAMILY MEDICINE

## 2025-06-17 PROCEDURE — 82570 ASSAY OF URINE CREATININE: CPT | Performed by: FAMILY MEDICINE

## 2025-06-17 PROCEDURE — 3074F SYST BP LT 130 MM HG: CPT | Performed by: FAMILY MEDICINE

## 2025-06-17 PROCEDURE — 3051F HG A1C>EQUAL 7.0%<8.0%: CPT | Performed by: FAMILY MEDICINE

## 2025-06-17 RX ORDER — INSULIN GLARGINE 100 [IU]/ML
INJECTION, SOLUTION SUBCUTANEOUS
Qty: 54 ML | Refills: 3 | Status: SHIPPED | OUTPATIENT
Start: 2025-06-17

## 2025-06-17 ASSESSMENT — PAIN SCALES - GENERAL: PAINLEVEL_OUTOF10: NO PAIN (0)

## 2025-06-17 NOTE — PROGRESS NOTES
Preventive Care Visit  [facility]  Rama Michel MD, Family Medicine  Jun 17, 2025      Assessment & Plan   Problem List Items Addressed This Visit          Endocrine    Hyperlipidemia LDL goal <70    Relevant Orders    Lipid panel reflex to direct LDL Fasting (Completed)    OFFICE/OUTPT VISIT,EST,LEVL IV    Type 2 diabetes mellitus with microalbuminuria, with long-term current use of insulin (H)    Relevant Medications    insulin glargine (LANTUS SOLOSTAR) 100 UNIT/ML pen    Other Relevant Orders    Hemoglobin A1c (Completed)    OFFICE/OUTPT VISIT,EST,LEVL IV       Genitourinary/Renal    Chronic kidney disease, stage 3b (H)    Relevant Orders    Albumin Random Urine Quantitative with Creat Ratio (Completed)    Comprehensive metabolic panel (BMP + Alb, Alk Phos, ALT, AST, Total. Bili, TP) (Completed)    OFFICE/OUTPT VISIT,EST,LEVL IV     Other Visit Diagnoses         Encounter for Medicare annual wellness exam    -  Primary            Diagnosis Comments   1. Encounter for Medicare annual wellness exam  Patient is an 80 yr old male here for his annual medicare wellness examination. He is doing relatively well      2. Type 2 diabetes mellitus with microalbuminuria, with long-term current use of insulin (H)  Hemoglobin A1c - A1C was the same as last check, we made some adjustments to his night time insulin as he was having low sugars in the morning. We changed his night time insulin to 25 units.      3. Chronic kidney disease, stage 3b (H)  Albumin Random Urine Quantitative with Creat Ratio, Comprehensive metabolic panel (BMP + Alb, Alk Phos, ALT, AST, Total. Bili, TP)   Patient will be notified of results       4. Hyperlipidemia LDL goal <70  Lipid panel reflex to direct LDL Fasting   Labs ordered.            Patient has been advised of split billing requirements and indicates understanding: Yes       BMI  Estimated body mass index is 39.6 kg/m  as calculated from the  "following:    Height as of this encounter: 1.778 m (5' 10\").    Weight as of this encounter: 125.2 kg (276 lb).   Weight management plan: Discussed healthy diet and exercise guidelines  Reviewed preventive health counseling, as reflected in patient instructions       Regular exercise       Healthy diet/nutrition  Counseling  Appropriate preventive services were addressed with this patient via screening, questionnaire, or discussion as appropriate for fall prevention, nutrition, physical activity, Tobacco-use cessation, social engagement, weight loss and cognition.  Checklist reviewing preventive services available has been given to the patient.  Reviewed patient's diet, addressing concerns and/or questions.   The patient was instructed to see the dentist every 6 months.   The patient was provided with written information regarding signs of hearing loss.   Information on urinary incontinence and treatment options given to patient.       Follow-up    Follow-up Visit   Expected date:  Jun 25, 2026 (Approximate)      Follow Up Appointment Details:     Follow-up with whom?: PCP    Follow-Up for what?: Medicare Wellness    Welcome or Annual?: Annual Wellness    How?: In Person                 Subjective   Orville is a 80 year old, presenting for the following:  Physical (Wellness physical. ), Blood Draw (He is fasting today if labs are needed.), Diabetes (Recheck on diabetes.  He was having lower blood sugars when he was on the Mounjaro injection.  They have been lower even after that medication.  AM reading can be in the 50-80 range.  This morning it was 88.  Has to stagger his insulin he states due to stomach issues and sugar readings. ), and Imm/Inj (Discuss if he is due for a Covid injection.  Last injection was on 9-13-24.)        6/17/2025     8:11 AM   Additional Questions   Roomed by Mandy Walter CMA   Accompanied by Socorro-spouse.          HPI    Patient is a 80-year-old male accompanied by his wife today.  He is " here for his annual wellness exam.  His past medical history significant for hypertension, diabetes, obesity, BPH.  Type 2 diabetes  At his last visit we had introduced the patient to Mounjaro for better control of his diabetes.  He reports he had side effects to the medication especially hypoglycemia.  He has taken the medication for 1 month.  His A1c did improve to 7.5.  He reports he is also having some low sugars especially in the morning.  His wife reports that he eats dinner about 435 and then he takes his insulin right before bedtime.  So he is having low sugars in the morning.  We brainstormed about this and recommended that we reduce his evening Lantus to 25 units.  Also recommend having a like a protein bar before bedtime.  He is otherwise doing well had no other complaints about his diabetes.  Repeat A1c today was 7.5.  Encouraged the patient to keep working on diet and moving.    Hypertension  Blood pressure is within normal range.  Medications reviewed.  Labs ordered.    BPH  Patient's requested refill on his tamsulosin.  This appears to be working fine.    Health maintenance  Patient was reminded of his second shingles vaccine.  He will get his COVID-vaccine in the fall.      Chief Complaint   Patient presents with    Physical     Wellness physical.     Blood Draw     He is fasting today if labs are needed.    Diabetes     Recheck on diabetes.  He was having lower blood sugars when he was on the Mounjaro injection.  They have been lower even after that medication.  AM reading can be in the 50-80 range.  This morning it was 88.  Has to stagger his insulin he states due to stomach issues and sugar readings.     Imm/Inj     Discuss if he is due for a Covid injection.  Last injection was on 9-13-24.          Diabetes Follow-up    How often are you checking your blood sugar? Three times daily  Blood sugar testing frequency justification:  Routine checking.  What time of day are you checking your blood  sugars (select all that apply)?  Before meals, At bedtime, and dinnertime.  Have you had any blood sugars above 200?  Yes at times.  Usually it is because he has not taken any insulin at noon and doesn't have his insulin with.  Have you had any blood sugars below 70?  Yes See chief complaint.   What symptoms do you notice when your blood sugar is low?  Dizzy on occasion.  They have a glucose wafer to take if it is too low.  What concerns do you have today about your diabetes? Low blood sugar   Do you have any of these symptoms? (Select all that apply)  No numbness or tingling in feet.  No redness, sores or blisters on feet.  No complaints of excessive thirst.  No reports of blurry vision.  No significant changes to weight.      BP Readings from Last 2 Encounters:   06/17/25 118/72   01/30/25 124/72     Hemoglobin A1C (%)   Date Value   06/17/2025 7.5 (H)   04/07/2025 7.5 (H)   05/14/2021 8.7 (H)   05/06/2021 8.2 (H)     LDL Cholesterol Calculated (mg/dL)   Date Value   06/17/2025 51   01/30/2025 63   05/06/2021 55   01/07/2020 51           Advance Care Planning    Document on file is a Health Care Directive or POLST.        6/16/2025   General Health   How would you rate your overall physical health? (!) FAIR   Feel stress (tense, anxious, or unable to sleep) Not at all         6/16/2025   Nutrition   Diet: Regular (no restrictions)         6/16/2025   Exercise   Days per week of moderate/strenous exercise 0 days   Average minutes spent exercising at this level 0 min   (!) EXERCISE CONCERN      6/16/2025   Social Factors   Worry food won't last until get money to buy more No   Food not last or not have enough money for food? No   Do you have housing? (Housing is defined as stable permanent housing and does not include staying outside in a car, in a tent, in an abandoned building, in an overnight shelter, or couch-surfing.) Yes   Are you worried about losing your housing? No   Lack of transportation? No   Unable to  get utilities (heat,electricity)? No         6/17/2025   Fall Risk   Gait Speed Test (Document in seconds) 4.25   Gait Speed Test Interpretation Less than or equal to 5.00 seconds - PASS          6/16/2025   Activities of Daily Living- Home Safety   Needs help with the following daily activites None of the above   Safety concerns in the home None of the above         6/16/2025   Dental   Dentist two times every year? (!) NO         6/16/2025   Hearing Screening   Hearing concerns? (!) I NEED TO ASK PEOPLE TO SPEAK UP OR REPEAT THEMSELVES.    (!) IT'S HARD TO FOLLOW A CONVERSATION IN A NOISY RESTAURANT OR CROWDED ROOM.    (!) TROUBLE UNDESTANDING A SPEAKER IN A PUBLIC MEETING OR [employer] SERVICE.    (!) TROUBLE UNDERSTANDING SOFT OR WHISPERED SPEECH.    (!) TROUBLE UNDERSTANDING SPEECH ON THE TELEPHONE       Multiple values from one day are sorted in reverse-chronological order         6/16/2025   Driving Risk Screening   Patient/family members have concerns about driving No         6/16/2025   General Alertness/Fatigue Screening   Have you been more tired than usual lately? No         6/16/2025   Urinary Incontinence Screening   Bothered by leaking urine in past 6 months Yes         Today's PHQ-2 Score:       6/16/2025    12:36 PM   PHQ-2 ( 1999 [employer])   Q1: Little interest or pleasure in doing things 0   Q2: Feeling down, depressed or hopeless 0   PHQ-2 Score 0    Q1: Little interest or pleasure in doing things Not at all   Q2: Feeling down, depressed or hopeless Not at all   PHQ-2 Score 0       Patient-reported           6/16/2025   Substance Use   Alcohol more than 3/day or more than 7/wk No   Do you have a current opioid prescription? No   How severe/bad is pain from 1 to 10? 0/10 (No Pain)   Do you use any other substances recreationally? No     Social History     Tobacco Use    Smoking status: Never     Passive exposure: Never    Smokeless tobacco: Never   Vaping Use    Vaping status: Never Used   Substance  Use Topics    Alcohol use: Not Currently     Comment: rare    Drug use: Never                 Reviewed and updated as needed this visit by Provider    Allergies  Meds  Problems  Med Hx  Surg Hx             Past Medical History:   Diagnosis Date    Anemia     Arrhythmia     Atrial fibrillation (H) 9/17/2013    Chronic infection     ESBL    Chronic kidney disease     Diabetes mellitus     type 2    Difficulty in walking(719.7)     Gastro-oesophageal reflux disease     History of blood transfusion     Hypertension     Obese     Pancreatic disease     Pancreatitis     Sleep apnea     Bipap at night    Thrombosis of leg      Past Surgical History:   Procedure Laterality Date    COLONOSCOPY N/A 11/8/2018    Procedure: colonoscopy;  Surgeon: Adrian Chowdary MD;  Location: [facility]    ENDOSCOPIC INSERTION TUBE GASTROSTOMY  9/20/2013    Procedure: ENDOSCOPIC INSERTION TUBE GASTROSTOMY;  Exchange of Gastrojejunostomy Tube , dilation of cyst gastrostomy, endoscopic pancreatocolengiogram with dilation of cyst gastrostomy and stent removal, exchange of cyst duodenostomy stent;  Surgeon: Negro Munguia MD;  [facility]    ENDOSCOPIC INSERTION TUBE JEJUNOSTOMY  8/19/2013    Procedure: ENDOSCOPIC INSERTION TUBE JEJUNOSTOMY;;  Surgeon: Drew Palacios MD;  [facility]    ENDOSCOPIC RETROGRADE CHOLANGIOPANCREATOGRAM  8/19/2013    Procedure: ENDOSCOPIC RETROGRADE CHOLANGIOPANCREATOGRAM;;  Surgeon: Drew Palacios MD;  [facility]    ENDOSCOPIC RETROGRADE CHOLANGIOPANCREATOGRAM  9/12/2013    Procedure: ENDOSCOPIC RETROGRADE CHOLANGIOPANCREATOGRAM;  Endoscopic Retrograde Cholangiopancreatogram with necrosectomy, dilation of wall of necrosis, stent removal x 4 and gastro-jejunal tube placement.;  Surgeon: Negro Munguia MD;  [facility]    ENDOSCOPIC RETROGRADE CHOLANGIOPANCREATOGRAM  10/7/2013    Procedure: ENDOSCOPIC RETROGRADE CHOLANGIOPANCREATOGRAM;  endoscopic retrograde cholangiopancreatogram, cyst  gastrostomy stent exchange, necrosectomy.;  Surgeon: Drew Palacios MD;  [facility]    ENDOSCOPIC RETROGRADE CHOLANGIOPANCREATOGRAM  11/18/2013    Procedure: ENDOSCOPIC RETROGRADE CHOLANGIOPANCREATOGRAM;  endoscopic retrograde cholangiopancreatogram with necrosectomy;  Surgeon: Drew Palacios MD;  [facility]    ENDOSCOPIC RETROGRADE CHOLANGIOPANCREATOGRAM  3/12/2014    Procedure: ENDOSCOPIC RETROGRADE CHOLANGIOPANCREATOGRAM;  Endoscopic Retrograde Cholangiopancreatogram with Stent Placement in Cyst Gastrostomy;  Surgeon: Winter Bowden MD;  [facility]    ENDOSCOPIC RETROGRADE CHOLANGIOPANCREATOGRAPHY, LITHOTRIPSY PANCREAS, COMBINED  9/19/2013    Procedure: COMBINED ENDOSCOPIC RETROGRADE CHOLANGIOPANCREATOGRAPHY, LITHOTRIPSY PANCREAS;  Endoscopic Retrograde Cholangiopancreatogram, placement of 2 stents in cyst gastrostomy;  Surgeon: Drew Palacios MD;  [facility]    ENDOSCOPIC ULTRASOUND UPPER GASTROINTESTINAL TRACT (GI)  7/22/2013    Procedure: ENDOSCOPIC ULTRASOUND UPPER GASTROINTESTINAL TRACT (GI);  Endoscopic Ultrasound;  Surgeon: Drew Palacios MD;  [facility]    ENDOSCOPIC ULTRASOUND UPPER GASTROINTESTINAL TRACT (GI)  8/19/2013    Procedure: ENDOSCOPIC ULTRASOUND UPPER GASTROINTESTINAL TRACT (GI);  Endoscopic ultrasound with fine needle aspiration,  Endorscopic retrograde cholangiopancreatogram with cystgastrostomy,      ENDOSCOPIC ULTRASOUND UPPER GASTROINTESTINAL TRACT (GI)  9/19/2013    Procedure: ENDOSCOPIC ULTRASOUND UPPER GASTROINTESTINAL TRACT (GI);  Endoscopic Ultrasound of Upper Gastrointestinal Tract;  Surgeon: Drew Palacios MD;  [facility]    ENDOSCOPIC ULTRASOUND UPPER GASTROINTESTINAL TRACT (GI)  8/7/2014    Procedure: ENDOSCOPIC ULTRASOUND, ESOPHAGOSCOPY / UPPER GASTROINTESTINAL TRACT (GI);  Surgeon: Adrian Plaza MD;  [facility]    ESOPHAGOSCOPY, GASTROSCOPY, DUODENOSCOPY (EGD), COMBINED  12/17/2013    Procedure: COMBINED ESOPHAGOSCOPY,  GASTROSCOPY, DUODENOSCOPY (EGD);  EGD with stent removal and GJ tube replacement;  Surgeon: Negro Munguia MD;  [facility]    ESOPHAGOSCOPY, GASTROSCOPY, DUODENOSCOPY (EGD), COMBINED  12/14/2013    Procedure: COMBINED ESOPHAGOSCOPY, GASTROSCOPY, DUODENOSCOPY (EGD);;  Surgeon: Winter Bowden MD;  [facility]    ESOPHAGOSCOPY, GASTROSCOPY, DUODENOSCOPY (EGD), COMBINED  7/29/2014    Procedure: COMBINED ESOPHAGOSCOPY, GASTROSCOPY, DUODENOSCOPY (EGD);  Surgeon: Negro Munguia MD;  [facility]    GASTROSTOMY TUBE      IR MISCELLANEOUS PROCEDURE  12/9/2013    LAPAROSCOPIC CHOLECYSTECTOMY  7/29/2014    Procedure: LAPAROSCOPIC CHOLECYSTECTOMY;  Surgeon: Olga Bruner MD;  [facility]    PICC INSERTION  12/20/2013    5fr DL Power PICC, 41cm (1cm external) in the R basilic vein w/ tip in the  mid SVC.    ZZHC COLONOSCOPY THRU STOMA, DIAGNOSTIC  2007    Normal     Labs reviewed in EPIC  BP Readings from Last 3 Encounters:   06/17/25 118/72   01/30/25 124/72   11/24/24 (!) 152/82    Wt Readings from Last 3 Encounters:   06/17/25 125.2 kg (276 lb)   01/30/25 122.5 kg (270 lb)   06/24/24 122.5 kg (270 lb)                  Patient Active Problem List   Diagnosis    Sensorineural hearing loss    RADHA (obstructive sleep apnea) with REM related hypoventilation-Moderate (AHI 26)    GERD (gastroesophageal reflux disease)    Anasarca    Necrotizing pancreatitis    Anemia due to blood loss, acute    Pseudoaneurysm    Pancreatitis, gallstone    Type 2 diabetes mellitus with hyperglycemia (H)    Herpes zoster without complication    Hyperlipidemia LDL goal <70    Type 2 diabetes mellitus with microalbuminuria, with long-term current use of insulin (H)    Chronic kidney disease, stage 3b (H)    Hyperparathyroidism     Past Surgical History:   Procedure Laterality Date    COLONOSCOPY N/A 11/8/2018    Procedure: colonoscopy;  Surgeon: Adrian Chowdary MD;  Location: [facility]    ENDOSCOPIC INSERTION TUBE  GASTROSTOMY  9/20/2013    Procedure: ENDOSCOPIC INSERTION TUBE GASTROSTOMY;  Exchange of Gastrojejunostomy Tube , dilation of cyst gastrostomy, endoscopic pancreatocolengiogram with dilation of cyst gastrostomy and stent removal, exchange of cyst duodenostomy stent;  Surgeon: Negro Munguia MD;  [facility]    ENDOSCOPIC INSERTION TUBE JEJUNOSTOMY  8/19/2013    Procedure: ENDOSCOPIC INSERTION TUBE JEJUNOSTOMY;;  Surgeon: Drew Palacios MD;  [facility]    ENDOSCOPIC RETROGRADE CHOLANGIOPANCREATOGRAM  8/19/2013    Procedure: ENDOSCOPIC RETROGRADE CHOLANGIOPANCREATOGRAM;;  Surgeon: Drew Palacios MD;  [facility]    ENDOSCOPIC RETROGRADE CHOLANGIOPANCREATOGRAM  9/12/2013    Procedure: ENDOSCOPIC RETROGRADE CHOLANGIOPANCREATOGRAM;  Endoscopic Retrograde Cholangiopancreatogram with necrosectomy, dilation of wall of necrosis, stent removal x 4 and gastro-jejunal tube placement.;  Surgeon: Negro Munguia MD;  [facility]    ENDOSCOPIC RETROGRADE CHOLANGIOPANCREATOGRAM  10/7/2013    Procedure: ENDOSCOPIC RETROGRADE CHOLANGIOPANCREATOGRAM;  endoscopic retrograde cholangiopancreatogram, cyst gastrostomy stent exchange, necrosectomy.;  Surgeon: Drew Palacios MD;  [facility]    ENDOSCOPIC RETROGRADE CHOLANGIOPANCREATOGRAM  11/18/2013    Procedure: ENDOSCOPIC RETROGRADE CHOLANGIOPANCREATOGRAM;  endoscopic retrograde cholangiopancreatogram with necrosectomy;  Surgeon: Drew Palacios MD;  [facility]    ENDOSCOPIC RETROGRADE CHOLANGIOPANCREATOGRAM  3/12/2014    Procedure: ENDOSCOPIC RETROGRADE CHOLANGIOPANCREATOGRAM;  Endoscopic Retrograde Cholangiopancreatogram with Stent Placement in Cyst Gastrostomy;  Surgeon: Winter Bowden MD;  [facility]    ENDOSCOPIC RETROGRADE CHOLANGIOPANCREATOGRAPHY, LITHOTRIPSY PANCREAS, COMBINED  9/19/2013    Procedure: COMBINED ENDOSCOPIC RETROGRADE CHOLANGIOPANCREATOGRAPHY, LITHOTRIPSY PANCREAS;  Endoscopic Retrograde Cholangiopancreatogram,  placement of 2 stents in cyst gastrostomy;  Surgeon: Drew Palacios MD;  [facility]    ENDOSCOPIC ULTRASOUND UPPER GASTROINTESTINAL TRACT (GI)  7/22/2013    Procedure: ENDOSCOPIC ULTRASOUND UPPER GASTROINTESTINAL TRACT (GI);  Endoscopic Ultrasound;  Surgeon: Drew Palacios MD;  [facility]    ENDOSCOPIC ULTRASOUND UPPER GASTROINTESTINAL TRACT (GI)  8/19/2013    Procedure: ENDOSCOPIC ULTRASOUND UPPER GASTROINTESTINAL TRACT (GI);  Endoscopic ultrasound with fine needle aspiration,  Endorscopic retrograde cholangiopancreatogram with cystgastrostomy,      ENDOSCOPIC ULTRASOUND UPPER GASTROINTESTINAL TRACT (GI)  9/19/2013    Procedure: ENDOSCOPIC ULTRASOUND UPPER GASTROINTESTINAL TRACT (GI);  Endoscopic Ultrasound of Upper Gastrointestinal Tract;  Surgeon: Drew Palacios MD;  [facility]    ENDOSCOPIC ULTRASOUND UPPER GASTROINTESTINAL TRACT (GI)  8/7/2014    Procedure: ENDOSCOPIC ULTRASOUND, ESOPHAGOSCOPY / UPPER GASTROINTESTINAL TRACT (GI);  Surgeon: Adrian Plaza MD;  [facility]    ESOPHAGOSCOPY, GASTROSCOPY, DUODENOSCOPY (EGD), COMBINED  12/17/2013    Procedure: COMBINED ESOPHAGOSCOPY, GASTROSCOPY, DUODENOSCOPY (EGD);  EGD with stent removal and GJ tube replacement;  Surgeon: Negro Munguia MD;  [facility]    ESOPHAGOSCOPY, GASTROSCOPY, DUODENOSCOPY (EGD), COMBINED  12/14/2013    Procedure: COMBINED ESOPHAGOSCOPY, GASTROSCOPY, DUODENOSCOPY (EGD);;  Surgeon: Winter Bowden MD;  [facility]    ESOPHAGOSCOPY, GASTROSCOPY, DUODENOSCOPY (EGD), COMBINED  7/29/2014    Procedure: COMBINED ESOPHAGOSCOPY, GASTROSCOPY, DUODENOSCOPY (EGD);  Surgeon: Negro Munguia MD;  [facility]    GASTROSTOMY TUBE      IR MISCELLANEOUS PROCEDURE  12/9/2013    LAPAROSCOPIC CHOLECYSTECTOMY  7/29/2014    Procedure: LAPAROSCOPIC CHOLECYSTECTOMY;  Surgeon: Olga Bruner MD;  [facility]    PICC INSERTION  12/20/2013    5fr DL Power PICC, 41cm (1cm external) in the R basilic  vein w/ tip in the  mid SVC.    ZZHC COLONOSCOPY THRU STOMA, DIAGNOSTIC  2007    Normal       Social History     Tobacco Use    Smoking status: Never     Passive exposure: Never    Smokeless tobacco: Never   Substance Use Topics    Alcohol use: Not Currently     Comment: rare     Family History   Problem Relation Age of Onset    Diabetes Mother     Eye Disorder Mother         blind from Diab.    Heart Disease Mother     Obesity Mother     Alcohol/Drug Father     Diabetes Maternal Grandmother     Eye Disorder Brother         Macular Degeneration    Cerebrovascular Disease Brother     Cancer Sister         Brain cancer    Other Cancer Sister         Brain  Cancer    C.A.D. Brother         2 bothers 1 sister in 60s    Cancer - colorectal No family hx of          Current Outpatient Medications   Medication Sig Dispense Refill    amLODIPine (NORVASC) 5 MG tablet Take 1 tablet (5 mg) by mouth daily. 135 tablet 3    blood glucose (ACCU-CHEK GUIDE) test strip USE TO CHECK GLUCOSE THREE TIMES DAILY 300 strip 10    blood glucose (NO BRAND SPECIFIED) lancets standard Use to test blood sugar three times daily. 90 each 3    furosemide (LASIX) 20 MG tablet Take 1 tablet (20 mg) by mouth daily. 60 tablet 1    insulin aspart (NOVOLOG FLEXPEN) 100 UNIT/ML pen Inject 10 unit(s) with breakfast and 9 units with lunch/dinner before meals + pre meal correction 1u per 30 >130 (Max TDD 50). 45 mL 1    insulin glargine (LANTUS SOLOSTAR) 100 UNIT/ML pen 30 units and 25 units at night 54 mL 3    insulin pen needle (RELION PEN NEEDLES) 31G X 6 MM miscellaneous USE 5 TIMES DAILY AS DIRECTED 150 each 2    losartan (COZAAR) 100 MG tablet Take 1 tablet (100 mg) by mouth daily. 90 tablet 3    tamsulosin (FLOMAX) 0.4 MG capsule Take 2 capsules by mouth once daily- Please prescribe when patient requests 180 capsule 3     Allergies   Allergen Reactions    Lipitor [Atorvastatin Calcium] Cramps     Leg cramps     Recent Labs   Lab Test 06/17/25  1618  04/07/25  0757 01/30/25  0942 06/18/24  0939 01/15/24  0732 03/03/22  0805 12/17/21  1356 05/14/21  0830 05/06/21  1035 09/08/20  1124 06/02/20  1153 05/14/19  0801 05/03/19  0645 12/11/18  1005 09/11/18  0838   A1C 7.5* 7.5* 7.7*   < > 8.1*   < >  --    < > 8.2*   < > 9.4*   < > 8.9*   < >  --    LDL 51  --  63  --  54   < >  --   --  55  --   --    < >  --    < >  --    HDL 39*  --  38*  --  37*   < >  --   --  50  --   --    < >  --    < >  --    TRIG 92  --  105  --  150*   < >  --   --  122  --   --    < >  --    < >  --    ALT 20  --   --   --   --   --  53  --  35  --   --    < >  --    < >  --    CR 1.37*  --  1.60*   < >  --    < > 0.89  --  0.97  --  1.07   < >  --    < > 1.02   GFRESTIMATED 52*  --  43*   < >  --    < > 82  --  75  --  67   < >  --    < > 71   GFRESTBLACK  --   --   --   --   --   --   --   --  87  --  78   < >  --    < > 86   POTASSIUM 5.4*  --  5.2   < >  --    < > 4.8   < > 5.4*  --  4.4   < >  --    < > 5.0   TSH  --   --   --   --   --   --   --   --   --   --   --   --  1.34  --  1.53    < > = values in this interval not displayed.      Current providers sharing in care for this patient include:  Patient Care Team:  Rama Michel MD as PCP - General (Family Medicine)  Ruby Jacobs RD as Diabetes Educator (Dietitian, Registered)  Rama Michel MD as Assigned PCP  Cira Tolliver RD as Diabetes Educator (Dietitian, Registered)    The following health maintenance items are reviewed in Epic and correct as of today:  Health Maintenance   Topic Date Due    ZOSTER VACCINE (2 of 2) 07/09/2019    DIABETIC FOOT EXAM  05/03/2024    ANNUAL REVIEW OF HM ORDERS  01/18/2025    COVID-19 VACCINE (9 - 2024-25 season) 03/13/2025    HEMOGLOBIN  06/18/2025    EYE EXAM  08/29/2025    A1C  12/17/2025    BMP  12/17/2025    MICROALBUMIN  12/17/2025    MEDICARE ANNUAL WELLNESS VISIT  06/17/2026    LIPID  06/17/2026    FALL RISK ASSESSMENT  06/17/2026    DTAP/TDAP/TD VACCINE  "(3 - Td or Tdap) 03/15/2028    ADVANCE CARE PLANNING  06/18/2030    PARATHYROID  Completed    PHOSPHORUS  Completed    PHQ-2 (once per calendar year)  Completed    INFLUENZA VACCINE  Completed    PNEUMOCOCCAL VACCINE 50+ YEARS  Completed    URINALYSIS  Completed    ALK PHOS  Completed    RSV VACCINE  Completed    HPV VACCINE  Aged Out    MENINGITIS VACCINE  Aged Out    COLORECTAL CANCER SCREENING  Discontinued         Review of Systems   Constitutional: Negative.    HENT: Negative.     Eyes: Negative.    Respiratory: Negative.     Cardiovascular: Negative.    Gastrointestinal: Negative.    Endocrine: Negative.         Hypoglycemia   Genitourinary: Negative.    Musculoskeletal: Negative.    Skin: Negative.    Allergic/Immunologic: Negative.    Neurological: Negative.    Hematological: Negative.    Psychiatric/Behavioral: Negative.           Objective    Exam  /72 (BP Location: Right arm, Patient Position: Chair, Cuff Size: Adult Large)   Pulse 76   Temp 97.8  F (36.6  C) (Tympanic)   Resp 20   Ht 1.778 m (5' 10\")   Wt 125.2 kg (276 lb)   SpO2 96%   BMI 39.60 kg/m     Estimated body mass index is 39.6 kg/m  as calculated from the following:    Height as of this encounter: 1.778 m (5' 10\").    Weight as of this encounter: 125.2 kg (276 lb).    Physical Exam  Constitutional:       Appearance: Normal appearance. He is obese.   HENT:      Head: Normocephalic and atraumatic.      Right Ear: Tympanic membrane normal.      Left Ear: Tympanic membrane normal.      Mouth/Throat:      Mouth: Mucous membranes are moist.   Eyes:      Extraocular Movements: Extraocular movements intact.      Pupils: Pupils are equal, round, and reactive to light.   Cardiovascular:      Rate and Rhythm: Normal rate and regular rhythm.   Pulmonary:      Effort: Pulmonary effort is normal. No respiratory distress.      Breath sounds: Normal breath sounds. No stridor. No wheezing, rhonchi or rales.   Chest:      Chest wall: No tenderness. "   Abdominal:      General: Abdomen is flat. Bowel sounds are normal. There is no distension.      Palpations: Abdomen is soft. There is no mass.      Tenderness: There is no abdominal tenderness. There is no right CVA tenderness, left CVA tenderness, guarding or rebound.      Hernia: No hernia is present.   Musculoskeletal:         General: Normal range of motion.      Cervical back: Normal range of motion and neck supple.   Skin:     General: Skin is warm and dry.   Neurological:      General: No focal deficit present.      Mental Status: He is alert and oriented to person, place, and time.   Psychiatric:         Mood and Affect: Mood normal.         Behavior: Behavior normal.                6/17/2025   Mini Cog   Clock Draw Score 2 Normal   3 Item Recall 2 objects recalled   Mini Cog Total Score 4          Signed Electronically by: Rama Michel MD

## 2025-06-18 ASSESSMENT — ENCOUNTER SYMPTOMS
ENDOCRINE NEGATIVE: 1
MUSCULOSKELETAL NEGATIVE: 1
HEMATOLOGIC/LYMPHATIC NEGATIVE: 1
RESPIRATORY NEGATIVE: 1
CONSTITUTIONAL NEGATIVE: 1
GASTROINTESTINAL NEGATIVE: 1
ENDOCRINE COMMENTS: HYPOGLYCEMIA
CARDIOVASCULAR NEGATIVE: 1
PSYCHIATRIC NEGATIVE: 1
ALLERGIC/IMMUNOLOGIC NEGATIVE: 1
EYES NEGATIVE: 1
NEUROLOGICAL NEGATIVE: 1

## 2025-06-18 NOTE — PATIENT INSTRUCTIONS
Patient Education   Preventive Care Advice   This is general advice given by our system to help you stay healthy. However, your care team may have specific advice just for you. Please talk to your care team about your preventive care needs.  Nutrition  Eat 5 or more servings of fruits and vegetables each day.  Try wheat bread, brown rice and whole grain pasta (instead of white bread, rice, and pasta).  Get enough calcium and vitamin D. Check the label on foods and aim for 100% of the RDA (recommended daily allowance).  Lifestyle  Exercise at least 150 minutes each week  (30 minutes a day, 5 days a week).  Do muscle strengthening activities 2 days a week. These help control your weight and prevent disease.  No smoking.  Wear sunscreen to prevent skin cancer.  Have a dental exam and cleaning every 6 months.  Yearly exams  See your health care team every year to talk about:  Any changes in your health.  Any medicines your care team has prescribed.  Preventive care, family planning, and ways to prevent chronic diseases.  Shots (vaccines)   HPV shots (up to age 26), if you've never had them before.  Hepatitis B shots (up to age 59), if you've never had them before.  COVID-19 shot: Get this shot when it's due.  Flu shot: Get a flu shot every year.  Tetanus shot: Get a tetanus shot every 10 years.  Pneumococcal, hepatitis A, and RSV shots: Ask your care team if you need these based on your risk.  Shingles shot (for age 50 and up)  General health tests  Diabetes screening:  Starting at age 35, Get screened for diabetes at least every 3 years.  If you are younger than age 35, ask your care team if you should be screened for diabetes.  Cholesterol test: At age 39, start having a cholesterol test every 5 years, or more often if advised.  Bone density scan (DEXA): At age 50, ask your care team if you should have this scan for osteoporosis (brittle bones).  Hepatitis C: Get tested at least once in your life.  STIs (sexually  transmitted infections)  Before age 24: Ask your care team if you should be screened for STIs.  After age 24: Get screened for STIs if you're at risk. You are at risk for STIs (including HIV) if:  You are sexually active with more than one person.  You don't use condoms every time.  You or a partner was diagnosed with a sexually transmitted infection.  If you are at risk for HIV, ask about PrEP medicine to prevent HIV.  Get tested for HIV at least once in your life, whether you are at risk for HIV or not.  Cancer screening tests  Cervical cancer screening: If you have a cervix, begin getting regular cervical cancer screening tests starting at age 21.  Breast cancer scan (mammogram): If you've ever had breasts, begin having regular mammograms starting at age 40. This is a scan to check for breast cancer.  Colon cancer screening: It is important to start screening for colon cancer at age 45.  Have a colonoscopy test every 10 years (or more often if you're at risk) Or, ask your provider about stool tests like a FIT test every year or Cologuard test every 3 years.  To learn more about your testing options, visit:   .  For help making a decision, visit:   https://bit.ly/er49481.  Prostate cancer screening test: If you have a prostate, ask your care team if a prostate cancer screening test (PSA) at age 55 is right for you.  Lung cancer screening: If you are a current or former smoker ages 50 to 80, ask your care team if ongoing lung cancer screenings are right for you.  For informational purposes only. Not to replace the advice of your health care provider. Copyright   2023 [facility]. All rights reserved. Clinically reviewed by the [facility]. [employer] [id number] - REV 01/24.  Preventing Falls: Care Instructions  Injuries and health problems such as trouble walking or poor eyesight can increase your risk of falling. So can some medicines. But there are things you can do to help  "prevent falls. You can exercise to get stronger. You can also arrange your home to make it safer.    Talk to your doctor about the medicines you take. Ask if any of them increase the risk of falls and whether they can be changed or stopped.   Try to exercise regularly. It can help improve your strength and balance. This can help lower your risk of falling.         Practice fall safety and prevention.   Wear low-heeled shoes that fit well and give your feet good support. Talk to your doctor if you have foot problems that make this hard.  Carry a cellphone or wear a medical alert device that you can use to call for help.  Use stepladders instead of chairs to reach high objects. Don't climb if you're at risk for falls. Ask for help, if needed.  Wear the correct eyeglasses, if you need them.        Make your home safer.   Remove rugs, cords, clutter, and furniture from walkways.  Keep your house well lit. Use night-lights in hallways and bathrooms.  Install and use sturdy handrails on stairways.  Wear nonskid footwear, even inside. Don't walk barefoot or in socks without shoes.        Be safe outside.   Use handrails, curb cuts, and ramps whenever possible.  Keep your hands free by using a shoulder bag or backpack.  Try to walk in well-lit areas. Watch out for uneven ground, changes in pavement, and debris.  Be careful in the winter. Walk on the grass or gravel when sidewalks are slippery. Use de-icer on steps and walkways. Add non-slip devices to shoes.    Put grab bars and nonskid mats in your shower or tub and near the toilet. Try to use a shower chair or bath bench when bathing.   Get into a tub or shower by putting in your weaker leg first. Get out with your strong side first. Have a phone or medical alert device in the bathroom with you.   Where can you learn more?  Go to https://www.[employer].net/patiented  Enter G117 in the search box to learn more about \"Preventing Falls: Care Instructions.\"  Current as of: " July 31, 2024  Content Version: 14.5    0982-0969 [employer].   Care instructions adapted under license by your healthcare professional. If you have questions about a medical condition or this instruction, always ask your healthcare professional. [employer] disclaims any warranty or liability for your use of this information.    Hearing Loss: Care Instructions  Overview     Hearing loss is a sudden or slow decrease in how well you hear. It can range from slight to profound. Permanent hearing loss can occur with aging. It also can happen when you are exposed long-term to loud noise. Examples include listening to loud music, riding motorcycles, or being around other loud machines.  Hearing loss can affect your work and home life. It can make you feel lonely or depressed. You may feel that you have lost your independence. But hearing aids and other devices can help you hear better and feel connected to others.  Follow-up care is a key part of your treatment and safety. Be sure to make and go to all appointments, and call your doctor if you are having problems. It's also a good idea to know your test results and keep a list of the medicines you take.  How can you care for yourself at home?  Avoid loud noises whenever possible. This helps keep your hearing from getting worse.  Always wear hearing protection around loud noises.  Wear a hearing aid as directed.  A professional can help you pick a hearing aid that will work best for you.  You can also get hearing aids over the counter for mild to moderate hearing loss.  Have hearing tests as your doctor suggests. They can show whether your hearing has changed. Your hearing aid may need to be adjusted.  Use other devices as needed. These may include:  Telephone amplifiers and hearing aids that can connect to a television, stereo, radio, or microphone.  Devices that use lights or vibrations. These alert you to the doorbell, a ringing telephone, or a  "baby monitor.  Television closed-captioning. This shows the words at the bottom of the screen. Most new TVs can do this.  TTY (text telephone). This lets you type messages back and forth on the telephone instead of talking or listening. These devices are also called TDD. When messages are typed on the keyboard, they are sent over the phone line to a receiving TTY. The message is shown on a monitor.  Use text messaging, social media, and email if it is hard for you to communicate by telephone.  Try to learn a listening technique called speechreading. It is not lipreading. You pay attention to people's gestures, expressions, posture, and tone of voice. These clues can help you understand what a person is saying. Face the person you are talking to, and have them face you. Make sure the lighting is good. You need to see the other person's face clearly.  Think about counseling if you need help to adjust to your hearing loss.  When should you call for help?  Watch closely for changes in your health, and be sure to contact your doctor if:    You think your hearing is getting worse.     You have new symptoms, such as dizziness or nausea.   Where can you learn more?  Go to https://www.[employer] Name.net/patiented  Enter R798 in the search box to learn more about \"Hearing Loss: Care Instructions.\"  Current as of: October 27, 2024  Content Version: 14.5 2024-2025 [employer]   Care instructions adapted under license by your healthcare professional. If you have questions about a medical condition or this instruction, always ask your healthcare professional. [employer] ([employer] disclaims any warranty or liability for your use of this information.    Bladder Training: Care Instructions  Your Care Instructions     Bladder training is used to treat urge incontinence and stress incontinence. Urge incontinence means that the need to urinate comes on so fast that you can't get to a toilet in time. Stress incontinence " means that you leak urine because of pressure on your bladder. For example, it may happen when you laugh, cough, or lift something heavy.  Bladder training can increase how long you can wait before you have to urinate. It can also help your bladder hold more urine. And it can give you better control over the urge to urinate.  It is important to remember that bladder training takes a few weeks to a few months to make a difference. You may not see results right away, but don't give up.  Follow-up care is a key part of your treatment and safety. Be sure to make and go to all appointments, and call your doctor if you are having problems. It's also a good idea to know your test results and keep a list of the medicines you take.  How can you care for yourself at home?  Work with your doctor to come up with a bladder training program that is right for you. You may use one or more of the following methods.  Delayed urination  In the beginning, try to keep from urinating for 5 minutes after you first feel the need to go.  While you wait, take deep, slow breaths to relax. Kegel exercises can also help you delay the need to go to the bathroom.  After some practice, when you can easily wait 5 minutes to urinate, try to wait 10 minutes before you urinate.  Slowly increase the waiting period until you are able to control when you have to urinate.  Scheduled urination  Empty your bladder when you first wake up in the morning.  Schedule times throughout the day when you will urinate.  Start by going to the bathroom every hour, even if you don't need to go.  Slowly increase the time between trips to the bathroom.  When you have found a schedule that works well for you, keep doing it.  If you wake up during the night and have to urinate, do it. Apply your schedule to waking hours only.  Kegel exercises  These tighten and strengthen pelvic muscles, which can help you control the flow of urine. (If doing these exercises causes pain,  "stop doing them and talk with your doctor.) To do Kegel exercises:  Squeeze your muscles as if you were trying not to pass gas. Or squeeze your muscles as if you were stopping the flow of urine. Your belly, legs, and buttocks shouldn't move.  Hold the squeeze for 3 seconds, then relax for 5 to 10 seconds.  Start with 3 seconds, then add 1 second each week until you are able to squeeze for 10 seconds.  Repeat the exercise 10 times a session. Do 3 to 8 sessions a day.  When should you call for help?  Watch closely for changes in your health, and be sure to contact your doctor if:    Your incontinence is getting worse.     You do not get better as expected.   Where can you learn more?  Go to https://www.ticketstreet.net/patiented  Enter V684 in the search box to learn more about \"Bladder Training: Care Instructions.\"  Current as of: April 30, 2024  Content Version: 14.5    1104-4961 [employer].   Care instructions adapted under license by your healthcare professional. If you have questions about a medical condition or this instruction, always ask your healthcare professional. [employer] disclaims any warranty or liability for your use of this information.       "

## 2025-07-01 ENCOUNTER — OFFICE VISIT (OUTPATIENT)
Dept: FAMILY MEDICINE | Facility: CLINIC | Age: 80
End: 2025-07-01
Payer: MEDICARE

## 2025-07-01 ENCOUNTER — ANCILLARY PROCEDURE (OUTPATIENT)
Dept: GENERAL RADIOLOGY | Facility: CLINIC | Age: 80
End: 2025-07-01
Attending: FAMILY MEDICINE
Payer: MEDICARE

## 2025-07-01 VITALS
RESPIRATION RATE: 20 BRPM | TEMPERATURE: 99 F | DIASTOLIC BLOOD PRESSURE: 68 MMHG | HEIGHT: 70 IN | OXYGEN SATURATION: 95 % | HEART RATE: 77 BPM | SYSTOLIC BLOOD PRESSURE: 126 MMHG | BODY MASS INDEX: 39.51 KG/M2 | WEIGHT: 276 LBS

## 2025-07-01 DIAGNOSIS — I25.10 CORONARY ARTERY DISEASE INVOLVING NATIVE CORONARY ARTERY OF NATIVE HEART WITHOUT ANGINA PECTORIS: ICD-10-CM

## 2025-07-01 DIAGNOSIS — N18.32 CHRONIC KIDNEY DISEASE, STAGE 3B (H): ICD-10-CM

## 2025-07-01 DIAGNOSIS — I25.2 HISTORY OF ST ELEVATION MYOCARDIAL INFARCTION (STEMI): Primary | ICD-10-CM

## 2025-07-01 DIAGNOSIS — S99.912A ANKLE INJURY, LEFT, INITIAL ENCOUNTER: ICD-10-CM

## 2025-07-01 DIAGNOSIS — Z23 NEED FOR VACCINATION: ICD-10-CM

## 2025-07-01 PROCEDURE — 1111F DSCHRG MED/CURRENT MED MERGE: CPT | Performed by: FAMILY MEDICINE

## 2025-07-01 PROCEDURE — 3074F SYST BP LT 130 MM HG: CPT | Performed by: FAMILY MEDICINE

## 2025-07-01 PROCEDURE — 90480 ADMN SARSCOV2 VAC 1/ONLY CMP: CPT | Performed by: FAMILY MEDICINE

## 2025-07-01 PROCEDURE — 99213 OFFICE O/P EST LOW 20 MIN: CPT | Performed by: FAMILY MEDICINE

## 2025-07-01 PROCEDURE — 1126F AMNT PAIN NOTED NONE PRSNT: CPT | Performed by: FAMILY MEDICINE

## 2025-07-01 PROCEDURE — 73610 X-RAY EXAM OF ANKLE: CPT | Mod: TC | Performed by: RADIOLOGY

## 2025-07-01 PROCEDURE — 91320 SARSCV2 VAC 30MCG TRS-SUC IM: CPT | Performed by: FAMILY MEDICINE

## 2025-07-01 PROCEDURE — 3078F DIAST BP <80 MM HG: CPT | Performed by: FAMILY MEDICINE

## 2025-07-01 ASSESSMENT — PAIN SCALES - GENERAL: PAINLEVEL_OUTOF10: NO PAIN (0)

## 2025-07-01 NOTE — PROGRESS NOTES
Assessment & Plan     History of ST elevation myocardial infarction (STEMI)  Recovering well at home. No acute symptoms or signs now.  Reviewed meds. Patient denies needing refill sent to pharmacy.  Follow up with cardiology.    Coronary artery disease involving native coronary artery of native heart without angina pectoris  Asymptomatic now.  Reinforced heart healthy lifestyle.   Continue current meds.  See cardiology as planned.    Ankle injury, left, initial encounter  Due to unclear mechanism of injury and patient's age, XR obtained to r/o occult fracture.  Patient will be contacted about result.  Patient has been able to bear weight albeit with pain. Caution advised.  If positive for fracture, get patient back for fitting of offloader boot and refer to orthopedics.  - XR Ankle Left G/E 3 Views    Chronic kidney disease, stage 3b (H)  Renal function likely worsened by intravenous dye during angiogram.  Stable renal function on recent labs.  Maintain hydration. Avoid nephrotoxins.     Need for vaccination  Covid19 vaccine today per patient request          MED REC REQUIRED  Post Medication Reconciliation Status: discharge medications reconciled, continue medications without change    Follow-up   No follow-ups on file.        Jackie Jacinto is a 80 year old, presenting for the following health issues:  Hospital F/U        7/1/2025     2:03 PM   Additional Questions   Roomed by Sondra CHACON MA   Accompanied by Wife Socorro         7/1/2025     2:03 PM   Patient Reported Additional Medications   Patient reports taking the following new medications Aspirin 81mg one time daily, nitroglycerin 0.4 mg sublingual tablet one tablet every 5 min with chest pain as needed,   ticagrelor 90 mg tablet one tablet two times daily,rosuvastatin 10 mg tablet one tablet daily     HPI        Hospital Follow-up Visit:    Hospital/Nursing Home/IP Rehab Facility: [facility]   Date of Admission: 6/23/25  Date of Discharge:  "6/25/25  Reason(s) for Admission: NSTEMI (non-ST elevated myocardial infarction), [place] OUTPATIENT CARDIAC REHAB W/O CONT ECG MONITOR   [place] OUTPATIENT CARDIAC REHAB W/CONT ECG MONITORING   (IA) [place] INTENS CARDIAC REHAB W/EXERC   (IA) [place] INTENS CARDIAC REHAB NO EXER   Do you have any other stressors you would like to discuss with your provider? No    Problems taking medications regularly:  None  Medication changes since discharge: None  Problems adhering to non-medication therapy:  None    Summary of hospitalization:  CareEverywhere information obtained and reviewed  Diagnostic Tests/Treatments reviewed.  Follow up needed: none  Other Healthcare Providers Involved in Patient s Care:         Specialist appointment - Cardiology  Update since discharge: improved.  Additional issues: left ankle pain - sprain     Renal insufficiency after angiogram.  Discontinued losartan.  Will be monitored by cardiology as well next week.    Plan of care communicated with patient and spouse.                 Review of Systems  Constitutional, HEENT, cardiovascular, pulmonary, GI, , musculoskeletal, neuro, skin, endocrine and psych systems are negative, except as otherwise noted.      Objective    /68 (BP Location: Right arm, Patient Position: Sitting, Cuff Size: Adult Regular)   Pulse 77   Temp 99  F (37.2  C) (Tympanic)   Resp 20   Ht 1.778 m (5' 10\")   Wt 125.2 kg (276 lb)   SpO2 95%   BMI 39.60 kg/m    Body mass index is 39.6 kg/m .  Physical Exam   GENERAL: alert and no distress, ambulatory w/ a cane but patient came in on a wheelchair  NECK: no tenderness, no adenopathy,  Thyroid not enlarged  RESP: lungs clear to auscultation - no rales, no rhonchi, no wheezes  CV: regular rates and rhythm, no murmur  MS: no edema  SKIN: no suspicious lesions, no rashes  NEURO: strength and tone- normal, sensory exam- grossly normal, mentation- intact, speech- normal, reflexes- symmetric  ABD:  nontender   LEFT ANKLE/FOOT: chronically " swollen ankle and lower leg (per patient, unchanged from baseline), no ecchymosis or erythema, diffuse tenderness around ankle and distal lower leg; limited range of motion due to edema and pain    No results found for this or any previous visit (from the past 24 hours).        Signed Electronically by: Amaury Wu MD

## 2025-07-02 ENCOUNTER — TELEPHONE (OUTPATIENT)
Dept: FAMILY MEDICINE | Facility: CLINIC | Age: 80
End: 2025-07-02
Payer: MEDICARE

## 2025-07-02 DIAGNOSIS — S82.832A OTHER CLOSED FRACTURE OF DISTAL END OF LEFT FIBULA, INITIAL ENCOUNTER: Primary | ICD-10-CM

## 2025-07-02 LAB — RADIOLOGIST FLAGS: ABNORMAL

## 2025-07-02 NOTE — TELEPHONE ENCOUNTER
Pt's wife calling back.  Read her message below.  Scheduled pt tomorrow 7/3 on MA schedule and gave her phone number for ortho.  She will call and make appt with them.    Ana Jo on 7/2/2025 at 4:32 PM

## 2025-07-02 NOTE — TELEPHONE ENCOUNTER
DATE/TIME OF CALL RECEIVED:  07/02/25 at 2:44 PM   Imaging/Test name:  xray of left ankle dated 07/01/25  Critical/incidental finding:  fracture noted on x-ray  Ordering Provider Name:  Dr Wu  Ordering Provider Department: [facility] [place]   Primary Care Provider: Rama Michel  Routing to Dr Michel as Dr Wu  not in clinic today.   Copied from office visit notes dated 07/01/25:  Ankle injury, left, initial encounter  Due to unclear mechanism of injury and patient's age, XR obtained to r/o occult fracture.  Patient will be contacted about result.  Patient has been able to bear weight albeit with pain. Caution advised.  If positive for fracture, get patient back for fitting of offloader boot and refer to orthopedics.  - XR Ankle Left G/E 3 Views  Lulu MAXWELL RN

## 2025-07-02 NOTE — TELEPHONE ENCOUNTER
It appears patient has an ankle fracture. Recommend coming in for a walking boot. I will place a referral to ortho right away

## 2025-07-02 NOTE — TELEPHONE ENCOUNTER
Left message for patient to call back. Please help him schedule on CMA schedule to get fit for walking boot and also give him referral info for rodrigue NICHOLSON CMA

## 2025-07-03 ENCOUNTER — RESULTS FOLLOW-UP (OUTPATIENT)
Dept: FAMILY MEDICINE | Facility: CLINIC | Age: 80
End: 2025-07-03

## 2025-07-03 ENCOUNTER — ALLIED HEALTH/NURSE VISIT (OUTPATIENT)
Dept: FAMILY MEDICINE | Facility: CLINIC | Age: 80
End: 2025-07-03
Payer: MEDICARE

## 2025-07-03 DIAGNOSIS — S99.912A ANKLE INJURY, LEFT, INITIAL ENCOUNTER: Primary | ICD-10-CM

## 2025-07-03 SDOH — HEALTH STABILITY: PHYSICAL HEALTH: ON AVERAGE, HOW MANY DAYS PER WEEK DO YOU ENGAGE IN MODERATE TO STRENUOUS EXERCISE (LIKE A BRISK WALK)?: 0 DAYS

## 2025-07-08 ENCOUNTER — OFFICE VISIT (OUTPATIENT)
Dept: ORTHOPEDICS | Facility: CLINIC | Age: 80
End: 2025-07-08
Attending: FAMILY MEDICINE
Payer: MEDICARE

## 2025-07-08 ENCOUNTER — ANCILLARY PROCEDURE (OUTPATIENT)
Dept: GENERAL RADIOLOGY | Facility: CLINIC | Age: 80
End: 2025-07-08
Attending: FAMILY MEDICINE
Payer: MEDICARE

## 2025-07-08 DIAGNOSIS — S82.832A OTHER CLOSED FRACTURE OF DISTAL END OF LEFT FIBULA, INITIAL ENCOUNTER: ICD-10-CM

## 2025-07-08 DIAGNOSIS — S99.912A INJURY OF LEFT ANKLE, INITIAL ENCOUNTER: Primary | ICD-10-CM

## 2025-07-08 PROCEDURE — 27780 TREATMENT OF FIBULA FRACTURE: CPT | Mod: LT | Performed by: FAMILY MEDICINE

## 2025-07-08 PROCEDURE — 73610 X-RAY EXAM OF ANKLE: CPT | Mod: TC | Performed by: STUDENT IN AN ORGANIZED HEALTH CARE EDUCATION/TRAINING PROGRAM

## 2025-07-08 PROCEDURE — 99203 OFFICE O/P NEW LOW 30 MIN: CPT | Mod: 57 | Performed by: FAMILY MEDICINE
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
## 2025-07-10 DIAGNOSIS — E11.65 TYPE 2 DIABETES MELLITUS WITH HYPERGLYCEMIA, WITH LONG-TERM CURRENT USE OF INSULIN (H): ICD-10-CM

## 2025-07-10 DIAGNOSIS — Z79.4 TYPE 2 DIABETES MELLITUS WITH HYPERGLYCEMIA, WITH LONG-TERM CURRENT USE OF INSULIN (H): ICD-10-CM

## 2025-07-10 RX ORDER — BLOOD SUGAR DIAGNOSTIC
STRIP MISCELLANEOUS
Qty: 300 STRIP | Refills: 1 | Status: SHIPPED | OUTPATIENT
Start: 2025-07-10

## 2025-07-15 DIAGNOSIS — E11.65 TYPE 2 DIABETES MELLITUS WITH HYPERGLYCEMIA, WITH LONG-TERM CURRENT USE OF INSULIN (H): Primary | ICD-10-CM

## 2025-07-15 DIAGNOSIS — Z79.4 TYPE 2 DIABETES MELLITUS WITH HYPERGLYCEMIA, WITH LONG-TERM CURRENT USE OF INSULIN (H): Primary | ICD-10-CM

## 2025-07-16 ENCOUNTER — PATIENT OUTREACH (OUTPATIENT)
Dept: CARE COORDINATION | Facility: CLINIC | Age: 80
End: 2025-07-16
Payer: MEDICARE

## 2025-07-17 SDOH — HEALTH STABILITY: PHYSICAL HEALTH: ON AVERAGE, HOW MANY MINUTES DO YOU ENGAGE IN EXERCISE AT THIS LEVEL?: 0 MIN

## 2025-07-17 SDOH — HEALTH STABILITY: PHYSICAL HEALTH: ON AVERAGE, HOW MANY DAYS PER WEEK DO YOU ENGAGE IN MODERATE TO STRENUOUS EXERCISE (LIKE A BRISK WALK)?: 0 DAYS

## 2025-07-22 ENCOUNTER — OFFICE VISIT (OUTPATIENT)
Dept: ORTHOPEDICS | Facility: CLINIC | Age: 80
End: 2025-07-22
Payer: MEDICARE

## 2025-07-22 ENCOUNTER — ANCILLARY PROCEDURE (OUTPATIENT)
Dept: GENERAL RADIOLOGY | Facility: CLINIC | Age: 80
End: 2025-07-22
Attending: FAMILY MEDICINE
Payer: MEDICARE

## 2025-07-22 DIAGNOSIS — S82.832D OTHER CLOSED FRACTURE OF DISTAL END OF LEFT FIBULA WITH ROUTINE HEALING, SUBSEQUENT ENCOUNTER: ICD-10-CM

## 2025-07-22 DIAGNOSIS — S99.912D INJURY OF LEFT ANKLE, SUBSEQUENT ENCOUNTER: ICD-10-CM

## 2025-07-22 DIAGNOSIS — S82.832D OTHER CLOSED FRACTURE OF DISTAL END OF LEFT FIBULA WITH ROUTINE HEALING, SUBSEQUENT ENCOUNTER: Primary | ICD-10-CM

## 2025-07-22 PROCEDURE — 73610 X-RAY EXAM OF ANKLE: CPT | Mod: TC | Performed by: RADIOLOGY

## 2025-07-22 PROCEDURE — 99207 PR FRACTURE CARE IN GLOBAL PERIOD: CPT | Performed by: FAMILY MEDICINE
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
## 2025-07-28 ENCOUNTER — TRANSFERRED RECORDS (OUTPATIENT)
Dept: HEALTH INFORMATION MANAGEMENT | Facility: CLINIC | Age: 80
End: 2025-07-28
Payer: MEDICARE

## 2025-07-29 DIAGNOSIS — R60.9 EDEMA, UNSPECIFIED TYPE: ICD-10-CM

## 2025-07-29 RX ORDER — FUROSEMIDE 20 MG/1
20 TABLET ORAL DAILY
Qty: 60 TABLET | Refills: 0 | Status: SHIPPED | OUTPATIENT
Start: 2025-07-29

## 2025-07-29 NOTE — TELEPHONE ENCOUNTER
Requested Prescriptions   Pending Prescriptions Disp Refills    furosemide (LASIX) 20 MG tablet [Pharmacy Med Name: Furosemide 20 MG Oral Tablet] 60 tablet 0     Sig: Take 1 tablet by mouth once daily       Diuretics (Including Combos) Protocol Failed - 7/29/2025 11:04 AM        Failed - Has GFR on file in past 12 months and most recent value is normal     Recent Labs   Lab Test 06/17/25  0918 12/17/21  1356 05/06/21  1035   GFRESTIMATED 52*   < > 75   GFRESTBLACK  --   --  87    < > = values in this interval not displayed.             Passed - Most recent blood pressure under 140/90 in past 12 months     BP Readings from Last 3 Encounters:   07/01/25 126/68   06/17/25 118/72   01/30/25 124/72       No data recorded            Passed - Potassium level on file in past 12 months     Recent Labs   Lab Test 06/17/25  0918 06/18/24  1121 12/09/23  1200   POTASSIUM 5.4*   < >  --    06926  --   --  5.4*    < > = values in this interval not displayed.             Passed - Medication is active on med list and the sig matches. RN to manually verify dose and sig if red X/fail.     If the protocol passes (green check), you do not need to verify med dose and sig.    A prescription matches if they are the same clinical intention.    For Example: once daily and every morning are the same.    The protocol can not identify upper and lower case letters as matching and will fail.     For Example: Take 1 tablet (50 mg) by mouth daily     TAKE 1 TABLET (50 MG) BY MOUTH DAILY    For all fails (red x), verify dose and sig.    If the refill does match what is on file, the RN can still proceed to approve the refill request.       If they do not match, route to the appropriate provider.             Passed - Medication indicated for associated diagnosis     Medication is associated with one or more of the following diagnoses:     Edema   Hypertension   Hypercalcemia   Heart Failure   Chronic Kidney Disease  (CKD)   Cardiomyopathy   Dyspnea   Chronic Thromboembolic Pulmonary Hypertension   Pulmonary Hypertension          Passed - Recent (12 month) or future (90 days) visit with authorizing provider's specialty (provided they have been seen in the past 15 months)     The patient must have completed an in-person or virtual visit within the past 12 months or has a future visit scheduled within the next 90 days with the authorizing provider s specialty.  Urgent care and e-visits do not qualify as an office visit for this protocol.          Passed - Patient is age 18 or older

## 2025-08-01 SDOH — HEALTH STABILITY: PHYSICAL HEALTH: ON AVERAGE, HOW MANY MINUTES DO YOU ENGAGE IN EXERCISE AT THIS LEVEL?: 0 MIN

## 2025-08-01 SDOH — HEALTH STABILITY: PHYSICAL HEALTH: ON AVERAGE, HOW MANY DAYS PER WEEK DO YOU ENGAGE IN MODERATE TO STRENUOUS EXERCISE (LIKE A BRISK WALK)?: 0 DAYS

## 2025-08-03 DIAGNOSIS — Z79.4 TYPE 2 DIABETES MELLITUS WITH HYPERGLYCEMIA, WITH LONG-TERM CURRENT USE OF INSULIN (H): ICD-10-CM

## 2025-08-03 DIAGNOSIS — E11.65 TYPE 2 DIABETES MELLITUS WITH HYPERGLYCEMIA, WITH LONG-TERM CURRENT USE OF INSULIN (H): ICD-10-CM

## 2025-08-04 RX ORDER — PEN NEEDLE, DIABETIC 32GX 5/32"
NEEDLE, DISPOSABLE MISCELLANEOUS
Qty: 500 EACH | Refills: 1 | Status: SHIPPED | OUTPATIENT
Start: 2025-08-04

## 2025-08-05 ENCOUNTER — OFFICE VISIT (OUTPATIENT)
Dept: ORTHOPEDICS | Facility: CLINIC | Age: 80
End: 2025-08-05
Payer: MEDICARE

## 2025-08-05 ENCOUNTER — ANCILLARY PROCEDURE (OUTPATIENT)
Dept: GENERAL RADIOLOGY | Facility: CLINIC | Age: 80
End: 2025-08-05
Attending: FAMILY MEDICINE
Payer: MEDICARE

## 2025-08-05 DIAGNOSIS — S99.912D INJURY OF LEFT ANKLE, SUBSEQUENT ENCOUNTER: ICD-10-CM

## 2025-08-05 DIAGNOSIS — S82.832D OTHER CLOSED FRACTURE OF DISTAL END OF LEFT FIBULA WITH ROUTINE HEALING, SUBSEQUENT ENCOUNTER: Primary | ICD-10-CM

## 2025-08-05 DIAGNOSIS — S82.832D OTHER CLOSED FRACTURE OF DISTAL END OF LEFT FIBULA WITH ROUTINE HEALING, SUBSEQUENT ENCOUNTER: ICD-10-CM

## 2025-08-05 PROCEDURE — 99207 PR FRACTURE CARE IN GLOBAL PERIOD: CPT | Performed by: FAMILY MEDICINE

## 2025-08-05 PROCEDURE — 73610 X-RAY EXAM OF ANKLE: CPT | Mod: TC | Performed by: STUDENT IN AN ORGANIZED HEALTH CARE EDUCATION/TRAINING PROGRAM

## 2025-08-27 ENCOUNTER — DOCUMENTATION ONLY (OUTPATIENT)
Dept: FAMILY MEDICINE | Facility: CLINIC | Age: 80
End: 2025-08-27
Payer: MEDICARE

## 2025-08-27 DIAGNOSIS — Z79.4 TYPE 2 DIABETES MELLITUS WITH HYPERGLYCEMIA, WITH LONG-TERM CURRENT USE OF INSULIN (H): Primary | ICD-10-CM

## 2025-08-27 DIAGNOSIS — E11.65 TYPE 2 DIABETES MELLITUS WITH HYPERGLYCEMIA, WITH LONG-TERM CURRENT USE OF INSULIN (H): Primary | ICD-10-CM

## 2025-08-28 ENCOUNTER — LAB (OUTPATIENT)
Dept: LAB | Facility: CLINIC | Age: 80
End: 2025-08-28
Payer: MEDICARE

## 2025-08-28 DIAGNOSIS — R60.0 LOWER EXTREMITY EDEMA: ICD-10-CM

## 2025-08-28 DIAGNOSIS — R60.0 LOWER EXTREMITY EDEMA: Primary | ICD-10-CM

## 2025-08-28 DIAGNOSIS — Z79.4 TYPE 2 DIABETES MELLITUS WITH HYPERGLYCEMIA, WITH LONG-TERM CURRENT USE OF INSULIN (H): ICD-10-CM

## 2025-08-28 DIAGNOSIS — E11.65 TYPE 2 DIABETES MELLITUS WITH HYPERGLYCEMIA, WITH LONG-TERM CURRENT USE OF INSULIN (H): ICD-10-CM

## 2025-08-28 LAB
ANION GAP SERPL CALCULATED.3IONS-SCNC: 13 MMOL/L (ref 7–15)
BUN SERPL-MCNC: 23.7 MG/DL (ref 8–23)
CALCIUM SERPL-MCNC: 9.1 MG/DL (ref 8.8–10.4)
CHLORIDE SERPL-SCNC: 101 MMOL/L (ref 98–107)
CREAT SERPL-MCNC: 1.26 MG/DL (ref 0.67–1.17)
EGFRCR SERPLBLD CKD-EPI 2021: 57 ML/MIN/1.73M2
EST. AVERAGE GLUCOSE BLD GHB EST-MCNC: 140 MG/DL
GLUCOSE SERPL-MCNC: 318 MG/DL (ref 70–99)
HBA1C MFR BLD: 6.5 % (ref 0–5.6)
HCO3 SERPL-SCNC: 22 MMOL/L (ref 22–29)
POTASSIUM SERPL-SCNC: 4.9 MMOL/L (ref 3.4–5.3)
SODIUM SERPL-SCNC: 136 MMOL/L (ref 135–145)

## (undated) RX ORDER — REGADENOSON 0.08 MG/ML
INJECTION, SOLUTION INTRAVENOUS
Status: DISPENSED
Start: 2024-06-24